# Patient Record
Sex: MALE | Race: WHITE | NOT HISPANIC OR LATINO | Employment: OTHER | URBAN - METROPOLITAN AREA
[De-identification: names, ages, dates, MRNs, and addresses within clinical notes are randomized per-mention and may not be internally consistent; named-entity substitution may affect disease eponyms.]

---

## 2017-02-28 ENCOUNTER — HOSPITAL ENCOUNTER (OUTPATIENT)
Dept: ONCOLOGY | Facility: CLINIC | Age: 62
Discharge: HOME | End: 2017-02-28
Attending: INTERNAL MEDICINE | Admitting: INTERNAL MEDICINE

## 2017-03-06 ENCOUNTER — HOSPITAL ENCOUNTER (OUTPATIENT)
Dept: ONCOLOGY | Facility: CLINIC | Age: 62
Discharge: HOME | End: 2017-03-06
Attending: INTERNAL MEDICINE | Admitting: INTERNAL MEDICINE

## 2017-03-16 ENCOUNTER — HOSPITAL ENCOUNTER (OUTPATIENT)
Dept: CARDIOLOGY | Facility: HOSPITAL | Age: 62
Discharge: HOME | End: 2017-03-16
Attending: SURGERY | Admitting: SURGERY

## 2017-04-03 ENCOUNTER — HOSPITAL ENCOUNTER (OUTPATIENT)
Dept: ONCOLOGY | Facility: CLINIC | Age: 62
Discharge: HOME | End: 2017-04-03
Attending: INTERNAL MEDICINE | Admitting: INTERNAL MEDICINE

## 2017-06-26 ENCOUNTER — HOSPITAL ENCOUNTER (OUTPATIENT)
Dept: CARDIOLOGY | Facility: HOSPITAL | Age: 62
Discharge: HOME | End: 2017-06-26
Attending: PHYSICIAN ASSISTANT | Admitting: PHYSICIAN ASSISTANT

## 2017-07-10 ENCOUNTER — HOSPITAL ENCOUNTER (OUTPATIENT)
Dept: ONCOLOGY | Facility: CLINIC | Age: 62
Discharge: HOME | End: 2017-07-10
Attending: INTERNAL MEDICINE | Admitting: INTERNAL MEDICINE

## 2017-07-10 LAB
ALBUMIN SERPL-MCNC: 4.4 G/DL (ref 3.5–4.8)
ALBUMIN/GLOB SERPL: 1.3 {RATIO} (ref 1–1.7)
ALP SERPL-CCNC: 75 IU/L (ref 32–91)
ALT SERPL-CCNC: 32 IU/L (ref 17–63)
ANION GAP SERPL CALC-SCNC: 16 MMOL/L (ref 10–20)
AST SERPL-CCNC: 27 IU/L (ref 15–41)
BILIRUB SERPL-MCNC: 0.7 MG/DL (ref 0.3–1.2)
BUN SERPL-MCNC: 13 MG/DL (ref 8–20)
BUN/CREAT SERPL: 10.8 (ref 6.2–20.3)
CALCIUM SERPL-MCNC: 9.4 MG/DL (ref 8.9–10.3)
CHLORIDE SERPL-SCNC: 100 MMOL/L (ref 101–111)
CONV CO2: 24 MMOL/L (ref 22–32)
CONV TOTAL PROTEIN: 7.7 G/DL (ref 6.1–7.9)
CREAT UR-MCNC: 1.2 MG/DL (ref 0.7–1.2)
FOLATE SERPL-MCNC: 17.5 NG/ML (ref 5.9–24.8)
GLOBULIN UR ELPH-MCNC: 3.3 G/DL (ref 2.5–3.8)
GLUCOSE SERPL-MCNC: 88 MG/DL (ref 65–99)
POTASSIUM SERPL-SCNC: 4 MMOL/L (ref 3.6–5.1)
SODIUM SERPL-SCNC: 136 MMOL/L (ref 136–144)

## 2017-07-17 ENCOUNTER — HOSPITAL ENCOUNTER (OUTPATIENT)
Dept: ONCOLOGY | Facility: CLINIC | Age: 62
Discharge: HOME | End: 2017-07-17
Attending: INTERNAL MEDICINE | Admitting: INTERNAL MEDICINE

## 2017-07-21 ENCOUNTER — HOSPITAL ENCOUNTER (OUTPATIENT)
Dept: ONCOLOGY | Facility: CLINIC | Age: 62
Discharge: HOME | End: 2017-07-21
Attending: INTERNAL MEDICINE | Admitting: INTERNAL MEDICINE

## 2017-08-07 ENCOUNTER — HOSPITAL ENCOUNTER (OUTPATIENT)
Dept: ONCOLOGY | Facility: CLINIC | Age: 62
Discharge: HOME | End: 2017-08-07
Attending: INTERNAL MEDICINE | Admitting: INTERNAL MEDICINE

## 2017-08-07 LAB — FERRITIN SERPL-MCNC: 226 NG/ML (ref 24–336)

## 2017-08-08 ENCOUNTER — HOSPITAL ENCOUNTER (OUTPATIENT)
Dept: INFUSION THERAPY | Facility: HOSPITAL | Age: 62
Discharge: HOME | End: 2017-08-08
Attending: INTERNAL MEDICINE | Admitting: INTERNAL MEDICINE

## 2017-08-08 LAB
ARMBAND: NORMAL
BASOPHILS # BLD AUTO: 0.1 10*3/UL (ref 0–0.2)
BASOPHILS NFR BLD AUTO: 1 % (ref 0–2)
BLD COMPONENT TYPE: NORMAL
BLD COMPONENT TYPE: NORMAL
BPU ID: NORMAL
CONV PRODUCT 1 STATUS: NORMAL
DIFFERENTIAL METHOD BLD: (no result)
EOSINOPHIL # BLD AUTO: 0.2 10*3/UL (ref 0–0.3)
EOSINOPHIL # BLD AUTO: 3 % (ref 0–3)
ERYTHROCYTE [DISTWIDTH] IN BLOOD BY AUTOMATED COUNT: 13.6 % (ref 11.5–14.5)
HCT VFR BLD AUTO: 45.8 % (ref 40–54)
HGB BLD-MCNC: 15.5 G/DL (ref 14–18)
LYMPHOCYTES # BLD AUTO: 2.2 10*3/UL (ref 0.8–4.8)
LYMPHOCYTES NFR BLD AUTO: 33 % (ref 18–42)
MCH RBC QN AUTO: 29.2 PG (ref 26–32)
MCHC RBC AUTO-ENTMCNC: 33.9 G/DL (ref 32–36)
MCV RBC AUTO: 86.2 FL (ref 80–94)
MONOCYTES # BLD AUTO: (no result) 10*3/UL (ref 0.1–1.3)
MONOCYTES NFR BLD AUTO: 10 % (ref 2–11)
NEUTROPHILS # BLD AUTO: (no result) 10*3/UL (ref 2.3–8.6)
NEUTROPHILS NFR BLD AUTO: 53 % (ref 50–75)
NRBC BLD AUTO-RTO: 0 /100{WBCS}
NRBC/RBC NFR BLD MANUAL: 0 10*3/UL
NUM BPU REQUESTED: 1
PATHOLOGIST REVIEW: (no result)
PLATELET # BLD AUTO: (no result) 10*3/UL (ref 150–450)
PMV BLD AUTO: 10 FL (ref 7.4–10.4)
RBC # BLD AUTO: 5.31 10*6/UL (ref 4.6–6)
TRANS STATUS: NORMAL
UNIT DIVISION: 0
WBC # BLD AUTO: 6.6 10*3/UL (ref 4.5–11.5)

## 2017-08-10 ENCOUNTER — HOSPITAL ENCOUNTER (OUTPATIENT)
Dept: ONCOLOGY | Facility: CLINIC | Age: 62
Discharge: HOME | End: 2017-08-10
Attending: INTERNAL MEDICINE | Admitting: INTERNAL MEDICINE

## 2017-08-11 ENCOUNTER — HOSPITAL ENCOUNTER (OUTPATIENT)
Dept: ONCOLOGY | Facility: CLINIC | Age: 62
Discharge: HOME | End: 2017-08-11
Attending: INTERNAL MEDICINE | Admitting: INTERNAL MEDICINE

## 2017-08-15 ENCOUNTER — HOSPITAL ENCOUNTER (OUTPATIENT)
Dept: ONCOLOGY | Facility: CLINIC | Age: 62
Discharge: HOME | End: 2017-08-15
Attending: NURSE PRACTITIONER | Admitting: NURSE PRACTITIONER

## 2017-08-15 LAB
ALBUMIN SERPL-MCNC: 3.3 G/DL (ref 3.5–4.8)
ALP SERPL-CCNC: 45 IU/L (ref 32–91)
ALT SERPL-CCNC: 76 IU/L (ref 17–63)
AST SERPL-CCNC: 56 IU/L (ref 15–41)
BILIRUB DIRECT SERPL-MCNC: 0.2 MG/DL (ref 0.1–0.5)
BILIRUB SERPL-MCNC: 0.7 MG/DL (ref 0.3–1.2)
CONV TOTAL PROTEIN: 8.9 G/DL (ref 6.1–7.9)

## 2017-08-21 ENCOUNTER — HOSPITAL ENCOUNTER (OUTPATIENT)
Dept: ONCOLOGY | Facility: CLINIC | Age: 62
Discharge: HOME | End: 2017-08-21
Attending: INTERNAL MEDICINE | Admitting: INTERNAL MEDICINE

## 2017-09-05 ENCOUNTER — HOSPITAL ENCOUNTER (OUTPATIENT)
Dept: ONCOLOGY | Facility: CLINIC | Age: 62
Setting detail: INFUSION SERIES
Discharge: HOME | End: 2017-09-05
Attending: INTERNAL MEDICINE | Admitting: INTERNAL MEDICINE

## 2017-09-05 ENCOUNTER — HOSPITAL ENCOUNTER (OUTPATIENT)
Dept: ONCOLOGY | Facility: HOSPITAL | Age: 62
Discharge: HOME | End: 2017-09-05
Attending: INTERNAL MEDICINE | Admitting: INTERNAL MEDICINE

## 2017-09-05 ENCOUNTER — CLINICAL SUPPORT (OUTPATIENT)
Dept: ONCOLOGY | Facility: HOSPITAL | Age: 62
End: 2017-09-05

## 2017-09-05 NOTE — PROGRESS NOTES
PATIENTS ONCOLOGY RECORD LOCATED IN [facility]      Subjective     Name:  NICHOLAS CRAMER     Date:  2017  Address:  [address] [place], [place] [address]  Home: [phone]  :  1955 AGE:  62 y.o.        RECORDS OBTAINED:  Patients Oncology Record is located in [facility]

## 2017-09-08 ENCOUNTER — HOSPITAL ENCOUNTER (OUTPATIENT)
Dept: ONCOLOGY | Facility: HOSPITAL | Age: 62
Discharge: HOME | End: 2017-09-08
Attending: INTERNAL MEDICINE | Admitting: INTERNAL MEDICINE

## 2017-09-08 ENCOUNTER — HOSPITAL ENCOUNTER (OUTPATIENT)
Dept: ONCOLOGY | Facility: CLINIC | Age: 62
Setting detail: INFUSION SERIES
Discharge: HOME | End: 2017-09-08
Attending: INTERNAL MEDICINE | Admitting: INTERNAL MEDICINE

## 2017-09-08 ENCOUNTER — CLINICAL SUPPORT (OUTPATIENT)
Dept: ONCOLOGY | Facility: HOSPITAL | Age: 62
End: 2017-09-08

## 2017-09-08 NOTE — PROGRESS NOTES
PATIENTS ONCOLOGY RECORD LOCATED IN [facility]      Subjective     Name:  NICHOLAS CRAMER     Date:  2017  Address:  [address] [place], [place] [address]  Home: [phone]  :  1955 AGE:  62 y.o.        RECORDS OBTAINED:  Patients Oncology Record is located in [facility]

## 2017-09-12 ENCOUNTER — HOSPITAL ENCOUNTER (OUTPATIENT)
Dept: ONCOLOGY | Facility: HOSPITAL | Age: 62
Discharge: HOME | End: 2017-09-12
Attending: INTERNAL MEDICINE | Admitting: INTERNAL MEDICINE

## 2017-09-12 ENCOUNTER — HOSPITAL ENCOUNTER (OUTPATIENT)
Dept: ONCOLOGY | Facility: CLINIC | Age: 62
Setting detail: INFUSION SERIES
Discharge: HOME | End: 2017-09-12
Attending: INTERNAL MEDICINE | Admitting: INTERNAL MEDICINE

## 2017-09-12 ENCOUNTER — CLINICAL SUPPORT (OUTPATIENT)
Dept: ONCOLOGY | Facility: HOSPITAL | Age: 62
End: 2017-09-12

## 2017-09-12 NOTE — PROGRESS NOTES
PATIENTS ONCOLOGY RECORD LOCATED IN [facility]      Subjective     Name:  NICHOLAS CRAMER     Date:  2017  Address:  [address] [place], [place] [address]  Home: [phone]  :  1955 AGE:  62 y.o.        RECORDS OBTAINED:  Patients Oncology Record is located in [facility]

## 2017-09-22 ENCOUNTER — HOSPITAL ENCOUNTER (OUTPATIENT)
Dept: ONCOLOGY | Facility: CLINIC | Age: 62
Setting detail: INFUSION SERIES
Discharge: HOME | End: 2017-09-22
Attending: NURSE PRACTITIONER | Admitting: NURSE PRACTITIONER

## 2017-09-22 ENCOUNTER — CLINICAL SUPPORT (OUTPATIENT)
Dept: ONCOLOGY | Facility: HOSPITAL | Age: 62
End: 2017-09-22

## 2017-09-22 ENCOUNTER — HOSPITAL ENCOUNTER (OUTPATIENT)
Dept: ONCOLOGY | Facility: HOSPITAL | Age: 62
Discharge: HOME | End: 2017-09-22
Attending: NURSE PRACTITIONER | Admitting: NURSE PRACTITIONER

## 2017-09-22 NOTE — PROGRESS NOTES
PATIENTS ONCOLOGY RECORD LOCATED IN [facility]      Subjective     Name:  NICHOLAS CRAMER     Date:  2017  Address:  [address] [place], [place] [address]  Home: [phone]  :  1955 AGE:  62 y.o.        RECORDS OBTAINED:  Patients Oncology Record is located in [facility]

## 2017-10-05 ENCOUNTER — CLINICAL SUPPORT (OUTPATIENT)
Dept: ONCOLOGY | Facility: HOSPITAL | Age: 62
End: 2017-10-05

## 2017-10-05 ENCOUNTER — HOSPITAL ENCOUNTER (OUTPATIENT)
Dept: ONCOLOGY | Facility: CLINIC | Age: 62
Setting detail: INFUSION SERIES
Discharge: HOME | End: 2017-10-05
Attending: INTERNAL MEDICINE | Admitting: INTERNAL MEDICINE

## 2017-10-05 ENCOUNTER — HOSPITAL ENCOUNTER (OUTPATIENT)
Dept: ONCOLOGY | Facility: HOSPITAL | Age: 62
Discharge: HOME | End: 2017-10-05
Attending: INTERNAL MEDICINE | Admitting: INTERNAL MEDICINE

## 2017-10-05 LAB
ALBUMIN SERPL-MCNC: 3.3 G/DL (ref 3.5–4.8)
ALPHA1 GLOB FLD ELPH-MCNC: 0.3 GM/DL (ref 0.1–0.4)
ALPHA2 GLOB SERPL ELPH-MCNC: 0.7 GM/DL (ref 0.5–1)
B-GLOBULIN SERPL ELPH-MCNC: 1 GM/DL (ref 0.7–1.4)
CONV TOTAL PROTEIN: 6.6 G/DL (ref 6.1–7.9)
GAMMA GLOB SERPL ELPH-MCNC: 1.4 GM/DL (ref 0.6–1.6)
INSULIN SERPL-ACNC: ABNORMAL U[IU]/ML

## 2017-10-05 NOTE — PROGRESS NOTES
PATIENTS ONCOLOGY RECORD LOCATED IN [facility]      Subjective     Name:  NICHOLAS CRAMER     Date:  10/05/2017  Address:  [address] [place], [place] [address]  Home: [phone]  :  1955 AGE:  62 y.o.        RECORDS OBTAINED:  Patients Oncology Record is located in [facility]

## 2017-10-06 ENCOUNTER — CLINICAL SUPPORT (OUTPATIENT)
Dept: ONCOLOGY | Facility: HOSPITAL | Age: 62
End: 2017-10-06

## 2017-10-06 ENCOUNTER — HOSPITAL ENCOUNTER (OUTPATIENT)
Dept: ONCOLOGY | Facility: CLINIC | Age: 62
Setting detail: INFUSION SERIES
Discharge: HOME | End: 2017-10-06
Attending: INTERNAL MEDICINE | Admitting: INTERNAL MEDICINE

## 2017-10-06 ENCOUNTER — HOSPITAL ENCOUNTER (OUTPATIENT)
Dept: ONCOLOGY | Facility: HOSPITAL | Age: 62
Discharge: HOME | End: 2017-10-06
Attending: INTERNAL MEDICINE | Admitting: INTERNAL MEDICINE

## 2017-10-10 ENCOUNTER — HOSPITAL ENCOUNTER (OUTPATIENT)
Dept: ONCOLOGY | Facility: CLINIC | Age: 62
Setting detail: INFUSION SERIES
Discharge: HOME | End: 2017-10-10
Attending: INTERNAL MEDICINE | Admitting: INTERNAL MEDICINE

## 2017-10-10 ENCOUNTER — CLINICAL SUPPORT (OUTPATIENT)
Dept: ONCOLOGY | Facility: HOSPITAL | Age: 62
End: 2017-10-10

## 2017-10-10 ENCOUNTER — HOSPITAL ENCOUNTER (OUTPATIENT)
Dept: ONCOLOGY | Facility: HOSPITAL | Age: 62
Discharge: HOME | End: 2017-10-10
Attending: INTERNAL MEDICINE | Admitting: INTERNAL MEDICINE

## 2017-10-10 NOTE — PROGRESS NOTES
PATIENTS ONCOLOGY RECORD LOCATED IN [facility]      Subjective     Name:  NICHOLAS CRAMER     Date:  10/10/2017  Address:  [address] [id number]  Home: [phone]  :  1955 AGE:  62 y.o.        RECORDS OBTAINED:  Patients Oncology Record is located in [facility]

## 2017-10-17 ENCOUNTER — CLINICAL SUPPORT (OUTPATIENT)
Dept: ONCOLOGY | Facility: HOSPITAL | Age: 62
End: 2017-10-17

## 2017-10-17 ENCOUNTER — HOSPITAL ENCOUNTER (OUTPATIENT)
Dept: ONCOLOGY | Facility: HOSPITAL | Age: 62
Discharge: HOME | End: 2017-10-17
Attending: INTERNAL MEDICINE | Admitting: INTERNAL MEDICINE

## 2017-10-17 ENCOUNTER — HOSPITAL ENCOUNTER (OUTPATIENT)
Dept: ONCOLOGY | Facility: CLINIC | Age: 62
Setting detail: INFUSION SERIES
Discharge: HOME | End: 2017-10-17
Attending: INTERNAL MEDICINE | Admitting: INTERNAL MEDICINE

## 2017-10-17 NOTE — PROGRESS NOTES
PATIENTS ONCOLOGY RECORD LOCATED IN [facility]      Subjective     Name:  NICHOLAS CRAMER     Date:  10/17/2017  Address:  [address] [place], [place] [address]  Home: [phone]  :  1955 AGE:  62 y.o.        RECORDS OBTAINED:  Patients Oncology Record is located in [facility]

## 2017-10-24 ENCOUNTER — HOSPITAL ENCOUNTER (OUTPATIENT)
Dept: ONCOLOGY | Facility: HOSPITAL | Age: 62
Discharge: HOME | End: 2017-10-24
Attending: INTERNAL MEDICINE | Admitting: INTERNAL MEDICINE

## 2017-10-24 ENCOUNTER — HOSPITAL ENCOUNTER (OUTPATIENT)
Dept: ONCOLOGY | Facility: CLINIC | Age: 62
Setting detail: INFUSION SERIES
Discharge: HOME | End: 2017-10-24
Attending: INTERNAL MEDICINE | Admitting: INTERNAL MEDICINE

## 2017-10-24 ENCOUNTER — CLINICAL SUPPORT (OUTPATIENT)
Dept: ONCOLOGY | Facility: HOSPITAL | Age: 62
End: 2017-10-24

## 2017-10-24 NOTE — PROGRESS NOTES
PATIENTS ONCOLOGY RECORD LOCATED IN [facility]      Subjective     Name:  NICHOLAS CRAMER     Date:  10/24/2017  Address:  [address] [place], [place] [address]  Home: [phone]  :  1955 AGE:  62 y.o.        RECORDS OBTAINED:  Patients Oncology Record is located in [facility]

## 2017-10-31 ENCOUNTER — HOSPITAL ENCOUNTER (OUTPATIENT)
Dept: ONCOLOGY | Facility: HOSPITAL | Age: 62
Discharge: HOME | End: 2017-10-31
Attending: INTERNAL MEDICINE | Admitting: INTERNAL MEDICINE

## 2017-10-31 ENCOUNTER — HOSPITAL ENCOUNTER (OUTPATIENT)
Dept: ONCOLOGY | Facility: CLINIC | Age: 62
Setting detail: INFUSION SERIES
Discharge: HOME | End: 2017-10-31
Attending: INTERNAL MEDICINE | Admitting: INTERNAL MEDICINE

## 2017-10-31 ENCOUNTER — CLINICAL SUPPORT (OUTPATIENT)
Dept: ONCOLOGY | Facility: HOSPITAL | Age: 62
End: 2017-10-31

## 2017-10-31 NOTE — PROGRESS NOTES
PATIENTS ONCOLOGY RECORD LOCATED IN [facility]      Subjective     Name:  NICHOLAS CRAMER     Date:  10/31/2017  Address:  [address] [place], [place] [address]  Home: [phone]  :  1955 AGE:  62 y.o.        RECORDS OBTAINED:  Patients Oncology Record is located in [facility]

## 2017-11-07 ENCOUNTER — CLINICAL SUPPORT (OUTPATIENT)
Dept: ONCOLOGY | Facility: HOSPITAL | Age: 62
End: 2017-11-07

## 2017-11-07 ENCOUNTER — HOSPITAL ENCOUNTER (OUTPATIENT)
Dept: ONCOLOGY | Facility: CLINIC | Age: 62
Setting detail: INFUSION SERIES
Discharge: HOME | End: 2017-11-07
Attending: INTERNAL MEDICINE | Admitting: INTERNAL MEDICINE

## 2017-11-07 ENCOUNTER — HOSPITAL ENCOUNTER (OUTPATIENT)
Dept: ONCOLOGY | Facility: HOSPITAL | Age: 62
Discharge: HOME | End: 2017-11-07
Attending: INTERNAL MEDICINE | Admitting: INTERNAL MEDICINE

## 2017-11-07 NOTE — PROGRESS NOTES
PATIENTS ONCOLOGY RECORD LOCATED IN [facility]      Subjective     Name:  NICHOLAS CRAMER     Date:  2017  Address:  [address] [place], [place] [address]  Home: [phone]  :  1955 AGE:  62 y.o.        RECORDS OBTAINED:  Patients Oncology Record is located in [facility]

## 2017-11-08 ENCOUNTER — HOSPITAL ENCOUNTER (OUTPATIENT)
Dept: ONCOLOGY | Facility: CLINIC | Age: 62
Setting detail: INFUSION SERIES
Discharge: HOME | End: 2017-11-08
Attending: INTERNAL MEDICINE | Admitting: INTERNAL MEDICINE

## 2017-11-08 ENCOUNTER — HOSPITAL ENCOUNTER (OUTPATIENT)
Dept: ONCOLOGY | Facility: HOSPITAL | Age: 62
Discharge: HOME | End: 2017-11-08
Attending: INTERNAL MEDICINE | Admitting: INTERNAL MEDICINE

## 2017-11-08 ENCOUNTER — CLINICAL SUPPORT (OUTPATIENT)
Dept: ONCOLOGY | Facility: HOSPITAL | Age: 62
End: 2017-11-08

## 2017-11-08 NOTE — PROGRESS NOTES
PATIENTS ONCOLOGY RECORD LOCATED IN [facility]      Subjective     Name:  NICHOLAS CRAMER     Date:  2017  Address:  [address] [place], [place] [address]  Home: [phone]  :  1955 AGE:  62 y.o.        RECORDS OBTAINED:  Patients Oncology Record is located in [facility]

## 2017-11-14 ENCOUNTER — HOSPITAL ENCOUNTER (OUTPATIENT)
Dept: ONCOLOGY | Facility: CLINIC | Age: 62
Setting detail: INFUSION SERIES
Discharge: HOME | End: 2017-11-14
Attending: INTERNAL MEDICINE | Admitting: INTERNAL MEDICINE

## 2017-11-14 ENCOUNTER — HOSPITAL ENCOUNTER (OUTPATIENT)
Dept: ONCOLOGY | Facility: HOSPITAL | Age: 62
Discharge: HOME | End: 2017-11-14
Attending: INTERNAL MEDICINE | Admitting: INTERNAL MEDICINE

## 2017-11-14 ENCOUNTER — CLINICAL SUPPORT (OUTPATIENT)
Dept: ONCOLOGY | Facility: HOSPITAL | Age: 62
End: 2017-11-14

## 2017-11-14 NOTE — PROGRESS NOTES
PATIENTS ONCOLOGY RECORD LOCATED IN [facility]      Subjective     Name:  NICHOLAS CRAMER     Date:  2017  Address:  [address] [place], [place] [address]  Home: [phone]  :  1955 AGE:  62 y.o.        RECORDS OBTAINED:  Patients Oncology Record is located in [facility]

## 2017-11-21 ENCOUNTER — HOSPITAL ENCOUNTER (OUTPATIENT)
Dept: ONCOLOGY | Facility: HOSPITAL | Age: 62
Discharge: HOME | End: 2017-11-21
Attending: INTERNAL MEDICINE | Admitting: INTERNAL MEDICINE

## 2017-11-21 ENCOUNTER — CLINICAL SUPPORT (OUTPATIENT)
Dept: ONCOLOGY | Facility: HOSPITAL | Age: 62
End: 2017-11-21

## 2017-11-21 ENCOUNTER — HOSPITAL ENCOUNTER (OUTPATIENT)
Dept: ONCOLOGY | Facility: CLINIC | Age: 62
Setting detail: INFUSION SERIES
Discharge: HOME | End: 2017-11-21
Attending: INTERNAL MEDICINE | Admitting: INTERNAL MEDICINE

## 2017-11-21 NOTE — PROGRESS NOTES
PATIENTS ONCOLOGY RECORD LOCATED IN [facility]      Subjective     Name:  NICHOLAS CRAMER     Date:  2017  Address:  [address] [place], [place] [address]  Home: [phone]  :  1955 AGE:  62 y.o.        RECORDS OBTAINED:  Patients Oncology Record [facility] in [facility]

## 2017-11-28 ENCOUNTER — HOSPITAL ENCOUNTER (OUTPATIENT)
Dept: ONCOLOGY | Facility: CLINIC | Age: 62
Setting detail: INFUSION SERIES
Discharge: HOME | End: 2017-11-28
Attending: INTERNAL MEDICINE | Admitting: INTERNAL MEDICINE

## 2017-11-28 ENCOUNTER — HOSPITAL ENCOUNTER (OUTPATIENT)
Dept: ONCOLOGY | Facility: HOSPITAL | Age: 62
Discharge: HOME | End: 2017-11-28
Attending: INTERNAL MEDICINE | Admitting: INTERNAL MEDICINE

## 2017-11-28 ENCOUNTER — CLINICAL SUPPORT (OUTPATIENT)
Dept: ONCOLOGY | Facility: HOSPITAL | Age: 62
End: 2017-11-28

## 2017-11-29 NOTE — PROGRESS NOTES
PATIENTS ONCOLOGY RECORD LOCATED IN [facility]      Subjective     Name:  NICHOLAS CRAMER     Date:  2017  Address:  [address] [place], [place] [address]  Home: [phone]  :  1955 AGE:  62 y.o.        RECORDS OBTAINED:  Patients Oncology Record is located in [facility]

## 2017-12-05 ENCOUNTER — HOSPITAL ENCOUNTER (OUTPATIENT)
Dept: ONCOLOGY | Facility: HOSPITAL | Age: 62
Discharge: HOME | End: 2017-12-05
Attending: INTERNAL MEDICINE | Admitting: INTERNAL MEDICINE

## 2017-12-05 ENCOUNTER — HOSPITAL ENCOUNTER (OUTPATIENT)
Dept: ONCOLOGY | Facility: CLINIC | Age: 62
Setting detail: INFUSION SERIES
Discharge: HOME | End: 2017-12-05
Attending: INTERNAL MEDICINE | Admitting: INTERNAL MEDICINE

## 2017-12-05 ENCOUNTER — CLINICAL SUPPORT (OUTPATIENT)
Dept: ONCOLOGY | Facility: HOSPITAL | Age: 62
End: 2017-12-05

## 2017-12-05 NOTE — PROGRESS NOTES
PATIENTS ONCOLOGY RECORD LOCATED IN [facility]      Subjective     Name:  NICHOLAS CRAMER     Date:  2017  Address:  [address] [place], [place] [address]  Home: [phone]  :  1955 AGE:  62 y.o.        RECORDS OBTAINED:  Patients Oncology Record is located in [facility]

## 2017-12-06 ENCOUNTER — HOSPITAL ENCOUNTER (OUTPATIENT)
Dept: ONCOLOGY | Facility: CLINIC | Age: 62
Setting detail: INFUSION SERIES
Discharge: HOME | End: 2017-12-06
Attending: NURSE PRACTITIONER | Admitting: NURSE PRACTITIONER

## 2017-12-06 ENCOUNTER — CLINICAL SUPPORT (OUTPATIENT)
Dept: ONCOLOGY | Facility: HOSPITAL | Age: 62
End: 2017-12-06

## 2017-12-06 ENCOUNTER — HOSPITAL ENCOUNTER (OUTPATIENT)
Dept: ONCOLOGY | Facility: HOSPITAL | Age: 62
Discharge: HOME | End: 2017-12-06
Attending: NURSE PRACTITIONER | Admitting: NURSE PRACTITIONER

## 2017-12-06 LAB
ALBUMIN SERPL-MCNC: 4.1 G/DL (ref 3.5–4.8)
ALBUMIN/GLOB SERPL: 1.5 {RATIO} (ref 1–1.7)
ALP SERPL-CCNC: 61 IU/L (ref 32–91)
ALT SERPL-CCNC: 34 IU/L (ref 17–63)
ANION GAP SERPL CALC-SCNC: 11.3 MMOL/L (ref 10–20)
AST SERPL-CCNC: 27 IU/L (ref 15–41)
BILIRUB SERPL-MCNC: 0.5 MG/DL (ref 0.3–1.2)
BUN SERPL-MCNC: 14 MG/DL (ref 8–20)
BUN/CREAT SERPL: 10.8 (ref 6.2–20.3)
CALCIUM SERPL-MCNC: 9.2 MG/DL (ref 8.9–10.3)
CHLORIDE SERPL-SCNC: 105 MMOL/L (ref 101–111)
CONV CO2: 26 MMOL/L (ref 22–32)
CONV TOTAL PROTEIN: 6.8 G/DL (ref 6.1–7.9)
CREAT UR-MCNC: 1.3 MG/DL (ref 0.7–1.2)
FERRITIN SERPL-MCNC: 58 NG/ML (ref 24–336)
GLOBULIN UR ELPH-MCNC: 2.7 G/DL (ref 2.5–3.8)
GLUCOSE SERPL-MCNC: 94 MG/DL (ref 65–99)
IGA1 MFR SER: 214 MG/DL (ref 50–400)
IGG1 SER-MCNC: 1080 MG/DL (ref 600–1500)
IGM SERPL-MCNC: 51 MG/DL (ref 40–300)
IRON SATN MFR SERPL: 20 % (ref 20–50)
IRON SERPL-MCNC: 72 UG/DL (ref 45–182)
POTASSIUM SERPL-SCNC: 4.3 MMOL/L (ref 3.6–5.1)
SODIUM SERPL-SCNC: 138 MMOL/L (ref 136–144)
TIBC SERPL-MCNC: 360 UG/DL (ref 228–428)

## 2017-12-06 NOTE — PROGRESS NOTES
PATIENTS ONCOLOGY RECORD LOCATED IN [facility]      Subjective     Name:  NICHOLAS CRAMER     Date:  2017  Address:  [address] [place], [place] [address]  Home: [phone]  :  1955 AGE:  62 y.o.        RECORDS OBTAINED:  Patients Oncology Record is located in [facility]

## 2017-12-12 ENCOUNTER — HOSPITAL ENCOUNTER (OUTPATIENT)
Dept: ONCOLOGY | Facility: HOSPITAL | Age: 62
Discharge: HOME | End: 2017-12-12
Attending: INTERNAL MEDICINE | Admitting: INTERNAL MEDICINE

## 2017-12-12 ENCOUNTER — CLINICAL SUPPORT (OUTPATIENT)
Dept: ONCOLOGY | Facility: HOSPITAL | Age: 62
End: 2017-12-12

## 2017-12-12 ENCOUNTER — HOSPITAL ENCOUNTER (OUTPATIENT)
Dept: ONCOLOGY | Facility: CLINIC | Age: 62
Setting detail: INFUSION SERIES
Discharge: HOME | End: 2017-12-12
Attending: INTERNAL MEDICINE | Admitting: INTERNAL MEDICINE

## 2017-12-13 NOTE — PROGRESS NOTES
PATIENTS ONCOLOGY RECORD LOCATED IN [facility]      Subjective     Name:  NICHOLAS CRAMER     Date:  2017  Address:  [address] [place], [place] [address]  Home: [phone]  :  1955 AGE:  62 y.o.        RECORDS OBTAINED:  Patients Oncology Record is located in [facility]

## 2017-12-19 ENCOUNTER — HOSPITAL ENCOUNTER (OUTPATIENT)
Dept: ONCOLOGY | Facility: CLINIC | Age: 62
Setting detail: INFUSION SERIES
Discharge: HOME | End: 2017-12-19
Attending: INTERNAL MEDICINE | Admitting: INTERNAL MEDICINE

## 2017-12-19 ENCOUNTER — HOSPITAL ENCOUNTER (OUTPATIENT)
Dept: ONCOLOGY | Facility: HOSPITAL | Age: 62
Discharge: HOME | End: 2017-12-19
Attending: INTERNAL MEDICINE | Admitting: INTERNAL MEDICINE

## 2017-12-19 ENCOUNTER — CLINICAL SUPPORT (OUTPATIENT)
Dept: ONCOLOGY | Facility: HOSPITAL | Age: 62
End: 2017-12-19

## 2017-12-26 ENCOUNTER — HOSPITAL ENCOUNTER (OUTPATIENT)
Dept: ONCOLOGY | Facility: HOSPITAL | Age: 62
Discharge: HOME | End: 2017-12-26
Attending: INTERNAL MEDICINE | Admitting: INTERNAL MEDICINE

## 2017-12-26 ENCOUNTER — CLINICAL SUPPORT (OUTPATIENT)
Dept: ONCOLOGY | Facility: HOSPITAL | Age: 62
End: 2017-12-26

## 2017-12-26 ENCOUNTER — HOSPITAL ENCOUNTER (OUTPATIENT)
Dept: ONCOLOGY | Facility: CLINIC | Age: 62
Setting detail: INFUSION SERIES
Discharge: HOME | End: 2017-12-26
Attending: INTERNAL MEDICINE | Admitting: INTERNAL MEDICINE

## 2017-12-26 NOTE — PROGRESS NOTES
PATIENTS ONCOLOGY RECORD LOCATED IN [facility]      Subjective     Name:  NICHOLAS CRAMER     Date:  2017  Address:  [address] [place], [place] [address]  Home: [phone]  :  1955 AGE:  62 y.o.        RECORDS OBTAINED:  Patients Oncology Record is located in [facility]

## 2018-01-03 ENCOUNTER — CLINICAL SUPPORT (OUTPATIENT)
Dept: ONCOLOGY | Facility: HOSPITAL | Age: 63
End: 2018-01-03

## 2018-01-03 ENCOUNTER — HOSPITAL ENCOUNTER (OUTPATIENT)
Dept: ONCOLOGY | Facility: CLINIC | Age: 63
Setting detail: INFUSION SERIES
Discharge: HOME | End: 2018-01-03
Attending: INTERNAL MEDICINE | Admitting: INTERNAL MEDICINE

## 2018-01-04 NOTE — PROGRESS NOTES
PATIENTS ONCOLOGY RECORD LOCATED IN [facility]      Subjective     Name:  NICHOLAS CRAMER     Date:  2018  Address:  [address] [place], [place] [address]  Home: [phone]  :  1955 AGE:  62 y.o.        RECORDS OBTAINED:  Patients Oncology Record is located in [facility]

## 2018-01-05 ENCOUNTER — HOSPITAL ENCOUNTER (OUTPATIENT)
Dept: ONCOLOGY | Facility: HOSPITAL | Age: 63
Discharge: HOME | End: 2018-01-05
Attending: INTERNAL MEDICINE | Admitting: INTERNAL MEDICINE

## 2018-01-05 ENCOUNTER — CLINICAL SUPPORT (OUTPATIENT)
Dept: ONCOLOGY | Facility: HOSPITAL | Age: 63
End: 2018-01-05

## 2018-01-05 ENCOUNTER — HOSPITAL ENCOUNTER (OUTPATIENT)
Dept: ONCOLOGY | Facility: CLINIC | Age: 63
Setting detail: INFUSION SERIES
Discharge: HOME | End: 2018-01-05
Attending: INTERNAL MEDICINE | Admitting: INTERNAL MEDICINE

## 2018-01-05 LAB
ALBUMIN SERPL-MCNC: 4 G/DL (ref 3.5–4.8)
ALBUMIN/GLOB SERPL: 1.4 {RATIO} (ref 1–1.7)
ALP SERPL-CCNC: 62 IU/L (ref 32–91)
ALT SERPL-CCNC: 34 IU/L (ref 17–63)
ANION GAP SERPL CALC-SCNC: 12.1 MMOL/L (ref 10–20)
AST SERPL-CCNC: 25 IU/L (ref 15–41)
BILIRUB SERPL-MCNC: 0.8 MG/DL (ref 0.3–1.2)
BUN SERPL-MCNC: 18 MG/DL (ref 8–20)
BUN/CREAT SERPL: 15 (ref 6.2–20.3)
CALCIUM SERPL-MCNC: 9 MG/DL (ref 8.9–10.3)
CHLORIDE SERPL-SCNC: 101 MMOL/L (ref 101–111)
CONV CO2: 23 MMOL/L (ref 22–32)
CONV TOTAL PROTEIN: 6.9 G/DL (ref 6.1–7.9)
CREAT UR-MCNC: 1.2 MG/DL (ref 0.7–1.2)
GLOBULIN UR ELPH-MCNC: 2.9 G/DL (ref 2.5–3.8)
GLUCOSE SERPL-MCNC: 197 MG/DL (ref 65–99)
MAGNESIUM SERPL-MCNC: 2 MG/DL (ref 1.8–2.5)
POTASSIUM SERPL-SCNC: 4.1 MMOL/L (ref 3.6–5.1)
SODIUM SERPL-SCNC: 132 MMOL/L (ref 136–144)

## 2018-01-05 NOTE — PROGRESS NOTES
PATIENTS ONCOLOGY RECORD LOCATED IN [facility]      Subjective     Name:  NICHOLAS CRAMER     Date:  2018  Address:  [facility] [id number]  Home: [phone]  :  1955 AGE:  62 y.o.        RECORDS OBTAINED:  Patients Oncology Record is located in [facility]

## 2018-01-10 ENCOUNTER — CLINICAL SUPPORT (OUTPATIENT)
Dept: ONCOLOGY | Facility: HOSPITAL | Age: 63
End: 2018-01-10

## 2018-01-10 ENCOUNTER — HOSPITAL ENCOUNTER (OUTPATIENT)
Dept: ONCOLOGY | Facility: CLINIC | Age: 63
Setting detail: INFUSION SERIES
Discharge: HOME | End: 2018-01-10
Attending: INTERNAL MEDICINE | Admitting: INTERNAL MEDICINE

## 2018-01-11 NOTE — PROGRESS NOTES
PATIENTS ONCOLOGY RECORD LOCATED IN [facility]      Subjective     Name:  NICHOLAS CRAMER     Date:  01/10/2018  Address:  [address] [id number]  Home: [phone]  :  1955 AGE:  62 y.o.        RECORDS OBTAINED:  Patients Oncology Record is located in [facility]

## 2018-01-24 ENCOUNTER — HOSPITAL ENCOUNTER (OUTPATIENT)
Dept: ONCOLOGY | Facility: CLINIC | Age: 63
Setting detail: INFUSION SERIES
Discharge: HOME | End: 2018-01-24
Attending: INTERNAL MEDICINE | Admitting: INTERNAL MEDICINE

## 2018-01-24 ENCOUNTER — CLINICAL SUPPORT (OUTPATIENT)
Dept: ONCOLOGY | Facility: HOSPITAL | Age: 63
End: 2018-01-24

## 2018-01-24 NOTE — PROGRESS NOTES
PATIENTS ONCOLOGY RECORD LOCATED IN [facility]      Subjective     Name:  NICHOLAS CRAMER     Date:  2018  Address:  [address] [place], [place] [address]  Home: [phone]  :  1955 AGE:  62 y.o.        RECORDS OBTAINED:  Patients Oncology Record is located in [facility]

## 2018-02-05 ENCOUNTER — HOSPITAL ENCOUNTER (OUTPATIENT)
Dept: ONCOLOGY | Facility: CLINIC | Age: 63
Setting detail: INFUSION SERIES
Discharge: HOME | End: 2018-02-05
Attending: NURSE PRACTITIONER | Admitting: NURSE PRACTITIONER

## 2018-02-05 ENCOUNTER — CLINICAL SUPPORT (OUTPATIENT)
Dept: ONCOLOGY | Facility: HOSPITAL | Age: 63
End: 2018-02-05

## 2018-02-05 NOTE — PROGRESS NOTES
PATIENTS ONCOLOGY RECORD LOCATED IN [facility]      Subjective     Name:  NICHOLAS CRAMER     Date:  2018  Address:  [address] [place], [place] [address]  Home: [phone]  :  1955 AGE:  62 y.o.        RECORDS OBTAINED:  Patients Oncology Record is located in [facility]

## 2018-02-19 ENCOUNTER — HOSPITAL ENCOUNTER (OUTPATIENT)
Dept: ONCOLOGY | Facility: CLINIC | Age: 63
Setting detail: INFUSION SERIES
Discharge: HOME | End: 2018-02-19
Attending: INTERNAL MEDICINE | Admitting: INTERNAL MEDICINE

## 2018-02-19 ENCOUNTER — CLINICAL SUPPORT (OUTPATIENT)
Dept: ONCOLOGY | Facility: HOSPITAL | Age: 63
End: 2018-02-19

## 2018-02-19 NOTE — PROGRESS NOTES
PATIENTS ONCOLOGY RECORD LOCATED IN [facility]      Subjective     Name:  NICHOLAS CRAMER     Date:  2018  Address:  [address] [place], [place] [address]  Home: [phone]  :  1955 AGE:  62 y.o.        RECORDS OBTAINED:  Patients Oncology Record is located in [facility]

## 2018-02-26 ENCOUNTER — HOSPITAL ENCOUNTER (OUTPATIENT)
Dept: ONCOLOGY | Facility: CLINIC | Age: 63
Setting detail: INFUSION SERIES
Discharge: HOME | End: 2018-02-26
Attending: INTERNAL MEDICINE | Admitting: INTERNAL MEDICINE

## 2018-02-26 ENCOUNTER — CLINICAL SUPPORT (OUTPATIENT)
Dept: ONCOLOGY | Facility: HOSPITAL | Age: 63
End: 2018-02-26

## 2018-02-27 NOTE — PROGRESS NOTES
PATIENTS ONCOLOGY RECORD LOCATED IN [facility]      Subjective     Name:  NICHOLAS CRAMER     Date:  2018  Address:  [address] [place], [place] [address]  Home: [phone]  :  1955 AGE:  62 y.o.        RECORDS OBTAINED:  Patients Oncology Record is located in [facility]

## 2018-03-05 ENCOUNTER — CLINICAL SUPPORT (OUTPATIENT)
Dept: ONCOLOGY | Facility: HOSPITAL | Age: 63
End: 2018-03-05

## 2018-03-05 ENCOUNTER — HOSPITAL ENCOUNTER (OUTPATIENT)
Dept: ONCOLOGY | Facility: CLINIC | Age: 63
Setting detail: INFUSION SERIES
Discharge: HOME | End: 2018-03-05
Attending: INTERNAL MEDICINE | Admitting: INTERNAL MEDICINE

## 2018-03-05 NOTE — PROGRESS NOTES
PATIENTS ONCOLOGY RECORD LOCATED IN [facility]      Subjective     Name:  NICHOLAS CRAMER     Date:  2018  Address:  [address] [place], [place] [address]  Home: [phone]  :  1955 AGE:  62 y.o.        RECORDS OBTAINED:  Patients Oncology Record is located in [facility]

## 2018-03-12 ENCOUNTER — CLINICAL SUPPORT (OUTPATIENT)
Dept: ONCOLOGY | Facility: HOSPITAL | Age: 63
End: 2018-03-12

## 2018-03-12 ENCOUNTER — HOSPITAL ENCOUNTER (OUTPATIENT)
Dept: ONCOLOGY | Facility: HOSPITAL | Age: 63
Discharge: HOME | End: 2018-03-12
Attending: INTERNAL MEDICINE | Admitting: INTERNAL MEDICINE

## 2018-03-12 ENCOUNTER — HOSPITAL ENCOUNTER (OUTPATIENT)
Dept: ONCOLOGY | Facility: CLINIC | Age: 63
Setting detail: INFUSION SERIES
Discharge: HOME | End: 2018-03-12
Attending: INTERNAL MEDICINE | Admitting: INTERNAL MEDICINE

## 2018-03-12 NOTE — PROGRESS NOTES
PATIENTS ONCOLOGY RECORD LOCATED IN [facility]      Subjective     Name:  NICHOLAS CRAMER     Date:  2018  Address:  [address] [place], [place] [address]  Home: [phone]  :  1955 AGE:  62 y.o.        RECORDS OBTAINED:  Patients Oncology Record is located in [facility]

## 2018-03-19 ENCOUNTER — CLINICAL SUPPORT (OUTPATIENT)
Dept: ONCOLOGY | Facility: HOSPITAL | Age: 63
End: 2018-03-19

## 2018-03-19 ENCOUNTER — HOSPITAL ENCOUNTER (OUTPATIENT)
Dept: ONCOLOGY | Facility: CLINIC | Age: 63
Setting detail: INFUSION SERIES
Discharge: HOME | End: 2018-03-19
Attending: INTERNAL MEDICINE | Admitting: INTERNAL MEDICINE

## 2018-03-19 NOTE — PROGRESS NOTES
PATIENTS ONCOLOGY RECORD LOCATED IN [facility]      Subjective     Name:  NICHOLAS CRAMER     Date:  2018  Address:  [address] [place], [place] [address]  Home: [phone]  :  1955 AGE:  62 y.o.        RECORDS OBTAINED:  Patients Oncology Record is located in [facility]

## 2018-04-04 ENCOUNTER — HOSPITAL ENCOUNTER (OUTPATIENT)
Dept: ONCOLOGY | Facility: CLINIC | Age: 63
Setting detail: INFUSION SERIES
Discharge: HOME | End: 2018-04-04
Attending: NURSE PRACTITIONER | Admitting: NURSE PRACTITIONER

## 2018-04-04 ENCOUNTER — CLINICAL SUPPORT (OUTPATIENT)
Dept: ONCOLOGY | Facility: HOSPITAL | Age: 63
End: 2018-04-04

## 2018-04-04 NOTE — PROGRESS NOTES
PATIENTS ONCOLOGY RECORD LOCATED IN [facility]      Subjective     Name:  NICHOLAS CRAMER     Date:  2018  Address:  [address] [place], [place] [address]  Home: [phone]  :  1955 AGE:  62 y.o.        RECORDS OBTAINED:  Patients Oncology Record is located in [facility]

## 2018-04-09 ENCOUNTER — CLINICAL SUPPORT (OUTPATIENT)
Dept: ONCOLOGY | Facility: HOSPITAL | Age: 63
End: 2018-04-09

## 2018-04-09 ENCOUNTER — HOSPITAL ENCOUNTER (OUTPATIENT)
Dept: ONCOLOGY | Facility: CLINIC | Age: 63
Setting detail: INFUSION SERIES
Discharge: HOME | End: 2018-04-09
Attending: INTERNAL MEDICINE | Admitting: INTERNAL MEDICINE

## 2018-04-09 NOTE — PROGRESS NOTES
PATIENTS ONCOLOGY RECORD LOCATED IN [facility]      Subjective     Name:  NICHOLAS CRAMER     Date:  2018  Address:  [address] [place], [place] [address]  Home: [phone]  :  1955 AGE:  62 y.o.        RECORDS OBTAINED:  Patients Oncology Record is located in [facility]

## 2018-04-16 ENCOUNTER — CLINICAL SUPPORT (OUTPATIENT)
Dept: ONCOLOGY | Facility: HOSPITAL | Age: 63
End: 2018-04-16

## 2018-04-16 ENCOUNTER — HOSPITAL ENCOUNTER (OUTPATIENT)
Dept: ONCOLOGY | Facility: CLINIC | Age: 63
Setting detail: INFUSION SERIES
Discharge: HOME | End: 2018-04-16
Attending: INTERNAL MEDICINE | Admitting: INTERNAL MEDICINE

## 2018-04-16 NOTE — PROGRESS NOTES
PATIENTS ONCOLOGY RECORD LOCATED IN [facility]      Subjective     Name:  NICHOLAS CRAMER     Date:  2018  Address:  [address] [place], [place] [address]  Home: [phone]  :  1955 AGE:  62 y.o.        RECORDS OBTAINED:  Patients Oncology Record is located in [facility]

## 2018-05-02 ENCOUNTER — CLINICAL SUPPORT (OUTPATIENT)
Dept: ONCOLOGY | Facility: HOSPITAL | Age: 63
End: 2018-05-02

## 2018-05-02 ENCOUNTER — HOSPITAL ENCOUNTER (OUTPATIENT)
Dept: ONCOLOGY | Facility: CLINIC | Age: 63
Setting detail: INFUSION SERIES
Discharge: HOME | End: 2018-05-02
Attending: INTERNAL MEDICINE | Admitting: INTERNAL MEDICINE

## 2018-05-02 NOTE — PROGRESS NOTES
PATIENTS ONCOLOGY RECORD LOCATED IN [facility]      Subjective     Name:  NICHOLAS CRAMER     Date:  2018  Address:  [address] [place], [place] [address]  Home: [phone]  :  1955 AGE:  62 y.o.        RECORDS OBTAINED:  Patients Oncology Record is located in [facility]

## 2018-05-07 ENCOUNTER — CLINICAL SUPPORT (OUTPATIENT)
Dept: ONCOLOGY | Facility: HOSPITAL | Age: 63
End: 2018-05-07

## 2018-05-07 ENCOUNTER — HOSPITAL ENCOUNTER (OUTPATIENT)
Dept: ONCOLOGY | Facility: CLINIC | Age: 63
Setting detail: INFUSION SERIES
Discharge: HOME | End: 2018-05-07
Attending: INTERNAL MEDICINE | Admitting: INTERNAL MEDICINE

## 2018-05-07 NOTE — PROGRESS NOTES
PATIENTS ONCOLOGY RECORD LOCATED IN [facility]      Subjective     Name:  NICHOLAS CRAMER     Date:  2018  Address:  [address] [place], [place] [address]  Home: [phone]  :  1955 AGE:  62 y.o.        RECORDS OBTAINED:  Patients Oncology Record is located in [facility]

## 2018-05-16 ENCOUNTER — HOSPITAL ENCOUNTER (OUTPATIENT)
Dept: LAB | Facility: HOSPITAL | Age: 63
Discharge: HOME | End: 2018-05-16
Attending: FAMILY MEDICINE | Admitting: FAMILY MEDICINE

## 2018-05-16 LAB
ALBUMIN SERPL-MCNC: 4.4 G/DL (ref 3.5–4.8)
ALBUMIN/GLOB SERPL: 1.6 {RATIO} (ref 1–1.7)
ALP SERPL-CCNC: 68 IU/L (ref 32–91)
ALT SERPL-CCNC: 30 IU/L (ref 17–63)
ANION GAP SERPL CALC-SCNC: 12.4 MMOL/L (ref 10–20)
AST SERPL-CCNC: 26 IU/L (ref 15–41)
BASOPHILS # BLD AUTO: 0 10*3/UL (ref 0–0.2)
BASOPHILS NFR BLD AUTO: 0 % (ref 0–2)
BILIRUB SERPL-MCNC: 0.6 MG/DL (ref 0.3–1.2)
BUN SERPL-MCNC: 18 MG/DL (ref 8–20)
BUN/CREAT SERPL: 13.8 (ref 6.2–20.3)
CALCIUM SERPL-MCNC: 9.5 MG/DL (ref 8.9–10.3)
CHLORIDE SERPL-SCNC: 99 MMOL/L (ref 101–111)
CONV CO2: 27 MMOL/L (ref 22–32)
CONV TOTAL PROTEIN: 7.2 G/DL (ref 6.1–7.9)
CREAT UR-MCNC: 1.3 MG/DL (ref 0.7–1.2)
DIFFERENTIAL METHOD BLD: (no result)
EOSINOPHIL # BLD AUTO: 0 % (ref 0–3)
EOSINOPHIL # BLD AUTO: 0 10*3/UL (ref 0–0.3)
ERYTHROCYTE [DISTWIDTH] IN BLOOD BY AUTOMATED COUNT: 14.4 % (ref 11.5–14.5)
GLOBULIN UR ELPH-MCNC: 2.8 G/DL (ref 2.5–3.8)
GLUCOSE SERPL-MCNC: 112 MG/DL (ref 65–99)
HCT VFR BLD AUTO: 50.6 % (ref 40–54)
HGB BLD-MCNC: 17 G/DL (ref 14–18)
LYMPHOCYTES # BLD AUTO: 2.9 10*3/UL (ref 0.8–4.8)
LYMPHOCYTES NFR BLD AUTO: 19 % (ref 18–42)
MAGNESIUM SERPL-MCNC: 2.1 MG/DL (ref 1.8–2.5)
MCH RBC QN AUTO: 29 PG (ref 26–32)
MCHC RBC AUTO-ENTMCNC: 33.7 G/DL (ref 32–36)
MCV RBC AUTO: 86 FL (ref 80–94)
MONOCYTES # BLD AUTO: 1.4 10*3/UL (ref 0.1–1.3)
MONOCYTES NFR BLD AUTO: 9 % (ref 2–11)
NEUTROPHILS # BLD AUTO: 10.8 10*3/UL (ref 2.3–8.6)
NEUTROPHILS NFR BLD AUTO: 72 % (ref 50–75)
NRBC BLD AUTO-RTO: 0 /100{WBCS}
NRBC/RBC NFR BLD MANUAL: 0 10*3/UL
PLATELET # BLD AUTO: 247 10*3/UL (ref 150–450)
PMV BLD AUTO: 8.9 FL (ref 7.4–10.4)
POTASSIUM SERPL-SCNC: 4.4 MMOL/L (ref 3.6–5.1)
RBC # BLD AUTO: 5.88 10*6/UL (ref 4.6–6)
SODIUM SERPL-SCNC: 134 MMOL/L (ref 136–144)
WBC # BLD AUTO: 15.2 10*3/UL (ref 4.5–11.5)

## 2018-05-21 ENCOUNTER — CLINICAL SUPPORT (OUTPATIENT)
Dept: ONCOLOGY | Facility: HOSPITAL | Age: 63
End: 2018-05-21

## 2018-05-21 ENCOUNTER — HOSPITAL ENCOUNTER (OUTPATIENT)
Dept: ONCOLOGY | Facility: CLINIC | Age: 63
Setting detail: INFUSION SERIES
Discharge: HOME | End: 2018-05-21
Attending: INTERNAL MEDICINE | Admitting: INTERNAL MEDICINE

## 2018-05-21 NOTE — PROGRESS NOTES
PATIENTS ONCOLOGY RECORD LOCATED IN [facility]      Subjective     Name:  NICHOLAS CRAMER     Date:  2018  Address:  [address] [place], [place] [address]  Home: [phone]  :  1955 AGE:  62 y.o.        RECORDS OBTAINED:  Patients Oncology Record is located in [facility]

## 2018-05-30 ENCOUNTER — HOSPITAL ENCOUNTER (OUTPATIENT)
Dept: ONCOLOGY | Facility: CLINIC | Age: 63
Setting detail: INFUSION SERIES
Discharge: HOME | End: 2018-05-30
Attending: INTERNAL MEDICINE | Admitting: INTERNAL MEDICINE

## 2018-05-30 ENCOUNTER — CLINICAL SUPPORT (OUTPATIENT)
Dept: ONCOLOGY | Facility: HOSPITAL | Age: 63
End: 2018-05-30

## 2018-05-30 NOTE — PROGRESS NOTES
PATIENTS ONCOLOGY RECORD LOCATED IN [facility]      Subjective     Name:  NICHOLAS CRAMER     Date:  2018  Address:  [address] [place], [place] [address]  Home: [phone]  :  1955 AGE:  62 y.o.        RECORDS OBTAINED:  Patients Oncology Record is located in [facility]

## 2018-06-06 ENCOUNTER — HOSPITAL ENCOUNTER (OUTPATIENT)
Dept: PHYSICAL THERAPY | Facility: HOSPITAL | Age: 63
Setting detail: RECURRING SERIES
Discharge: HOME | End: 2018-07-22
Attending: FAMILY MEDICINE | Admitting: FAMILY MEDICINE

## 2018-06-11 ENCOUNTER — HOSPITAL ENCOUNTER (OUTPATIENT)
Dept: ONCOLOGY | Facility: CLINIC | Age: 63
Setting detail: INFUSION SERIES
Discharge: HOME | End: 2018-06-11
Attending: INTERNAL MEDICINE | Admitting: INTERNAL MEDICINE

## 2018-06-11 ENCOUNTER — CLINICAL SUPPORT (OUTPATIENT)
Dept: ONCOLOGY | Facility: HOSPITAL | Age: 63
End: 2018-06-11

## 2018-06-11 NOTE — PROGRESS NOTES
PATIENTS ONCOLOGY RECORD LOCATED IN [facility]      Subjective     Name:  NICHOLAS CRAMER     Date:  2018  Address:  [address] [place], [place] [address]  Home: [phone]  :  1955 AGE:  62 y.o.        RECORDS OBTAINED:  Patients Oncology Record is located in [facility]

## 2018-06-25 ENCOUNTER — HOSPITAL ENCOUNTER (OUTPATIENT)
Dept: ONCOLOGY | Facility: HOSPITAL | Age: 63
Discharge: HOME | End: 2018-06-25
Attending: INTERNAL MEDICINE | Admitting: INTERNAL MEDICINE

## 2018-06-25 ENCOUNTER — CLINICAL SUPPORT (OUTPATIENT)
Dept: ONCOLOGY | Facility: HOSPITAL | Age: 63
End: 2018-06-25

## 2018-06-25 ENCOUNTER — HOSPITAL ENCOUNTER (OUTPATIENT)
Dept: ONCOLOGY | Facility: CLINIC | Age: 63
Setting detail: INFUSION SERIES
Discharge: HOME | End: 2018-06-25
Attending: FAMILY MEDICINE | Admitting: FAMILY MEDICINE

## 2018-06-25 LAB
ANION GAP SERPL CALC-SCNC: 13.2 MMOL/L (ref 10–20)
BUN SERPL-MCNC: 20 MG/DL (ref 8–20)
BUN/CREAT SERPL: 16.7 (ref 6.2–20.3)
CALCIUM SERPL-MCNC: 8.9 MG/DL (ref 8.9–10.3)
CHLORIDE SERPL-SCNC: 105 MMOL/L (ref 101–111)
CONV CO2: 20 MMOL/L (ref 22–32)
CREAT UR-MCNC: 1.2 MG/DL (ref 0.7–1.2)
GLUCOSE SERPL-MCNC: 209 MG/DL (ref 65–99)
POTASSIUM SERPL-SCNC: 4.2 MMOL/L (ref 3.6–5.1)
SODIUM SERPL-SCNC: 134 MMOL/L (ref 136–144)

## 2018-06-25 NOTE — PROGRESS NOTES
PATIENTS ONCOLOGY RECORD LOCATED IN [facility]      Subjective     Name:  NICHOLAS CRAMER     Date:  2018  Address:  [address] [place], [place] [address]  Home: [phone]  :  1955 AGE:  63 y.o.        RECORDS OBTAINED:  Patients Oncology Record is located in [facility]

## 2018-07-02 ENCOUNTER — HOSPITAL ENCOUNTER (OUTPATIENT)
Dept: ONCOLOGY | Facility: HOSPITAL | Age: 63
Discharge: HOME | End: 2018-07-02
Attending: NURSE PRACTITIONER | Admitting: NURSE PRACTITIONER

## 2018-07-02 ENCOUNTER — CLINICAL SUPPORT (OUTPATIENT)
Dept: ONCOLOGY | Facility: HOSPITAL | Age: 63
End: 2018-07-02

## 2018-07-02 ENCOUNTER — HOSPITAL ENCOUNTER (OUTPATIENT)
Dept: ONCOLOGY | Facility: CLINIC | Age: 63
Setting detail: INFUSION SERIES
Discharge: HOME | End: 2018-07-02
Attending: NURSE PRACTITIONER | Admitting: NURSE PRACTITIONER

## 2018-07-02 NOTE — PROGRESS NOTES
PATIENTS ONCOLOGY RECORD LOCATED IN [facility]      Subjective     Name:  NICHOLAS CRAMER     Date:  2018  Address:  [address] [place], [place] [address]  Home: [phone]  :  1955 AGE:  63 y.o.        RECORDS OBTAINED:  Patients Oncology Record is located in [facility]

## 2018-07-16 ENCOUNTER — CLINICAL SUPPORT (OUTPATIENT)
Dept: ONCOLOGY | Facility: HOSPITAL | Age: 63
End: 2018-07-16

## 2018-07-16 ENCOUNTER — HOSPITAL ENCOUNTER (OUTPATIENT)
Dept: ONCOLOGY | Facility: CLINIC | Age: 63
Setting detail: INFUSION SERIES
Discharge: HOME | End: 2018-07-16
Attending: INTERNAL MEDICINE | Admitting: INTERNAL MEDICINE

## 2018-07-16 NOTE — PROGRESS NOTES
PATIENTS ONCOLOGY RECORD LOCATED IN [facility]      Subjective     Name:  NICHOLAS CRAMER     Date:  2018  Address:  [facility] [id number]  Home: [phone]  :  1955 AGE:  63 y.o.        RECORDS OBTAINED:  Patients Oncology Record is located in [facility]

## 2018-07-23 ENCOUNTER — HOSPITAL ENCOUNTER (OUTPATIENT)
Dept: ONCOLOGY | Facility: CLINIC | Age: 63
Setting detail: INFUSION SERIES
Discharge: HOME | End: 2018-07-23
Attending: INTERNAL MEDICINE | Admitting: INTERNAL MEDICINE

## 2018-07-23 ENCOUNTER — CLINICAL SUPPORT (OUTPATIENT)
Dept: ONCOLOGY | Facility: HOSPITAL | Age: 63
End: 2018-07-23

## 2018-07-23 NOTE — PROGRESS NOTES
PATIENTS ONCOLOGY RECORD LOCATED IN [facility]      Subjective     Name:  NICHOLAS CRAMER     Date:  2018  Address:  [address] [place], [place] [address]  Home: [phone]  :  1955 AGE:  63 y.o.        RECORDS OBTAINED:  Patients Oncology Record is located in [facility]

## 2018-07-30 ENCOUNTER — CLINICAL SUPPORT (OUTPATIENT)
Dept: ONCOLOGY | Facility: HOSPITAL | Age: 63
End: 2018-07-30

## 2018-07-30 ENCOUNTER — HOSPITAL ENCOUNTER (OUTPATIENT)
Dept: ONCOLOGY | Facility: CLINIC | Age: 63
Setting detail: INFUSION SERIES
Discharge: HOME | End: 2018-07-30
Attending: INTERNAL MEDICINE | Admitting: INTERNAL MEDICINE

## 2018-07-30 NOTE — PROGRESS NOTES
PATIENTS ONCOLOGY RECORD LOCATED IN [facility]      Subjective     Name:  NICHOLAS CRAMER     Date:  2018  Address:  [address] [place], [place] [address]  Home: [phone]  :  1955 AGE:  63 y.o.        RECORDS OBTAINED:  Patients Oncology Record is located in [facility]

## 2018-08-10 ENCOUNTER — HOSPITAL ENCOUNTER (OUTPATIENT)
Dept: LAB | Facility: HOSPITAL | Age: 63
Discharge: HOME | End: 2018-08-10
Attending: FAMILY MEDICINE | Admitting: FAMILY MEDICINE

## 2018-08-10 LAB
ALBUMIN SERPL-MCNC: 3.9 G/DL (ref 3.5–4.8)
ALBUMIN/GLOB SERPL: 1.6 {RATIO} (ref 1–1.7)
ALP SERPL-CCNC: 59 IU/L (ref 32–91)
ALT SERPL-CCNC: 27 IU/L (ref 17–63)
ANION GAP SERPL CALC-SCNC: 10.9 MMOL/L (ref 10–20)
AST SERPL-CCNC: 19 IU/L (ref 15–41)
BILIRUB SERPL-MCNC: 0.8 MG/DL (ref 0.3–1.2)
BUN SERPL-MCNC: 20 MG/DL (ref 8–20)
BUN/CREAT SERPL: 14.3 (ref 6.2–20.3)
CALCIUM SERPL-MCNC: 9.1 MG/DL (ref 8.9–10.3)
CHLORIDE SERPL-SCNC: 105 MMOL/L (ref 101–111)
CONV CO2: 27 MMOL/L (ref 22–32)
CONV TOTAL PROTEIN: 6.3 G/DL (ref 6.1–7.9)
CREAT UR-MCNC: 1.4 MG/DL (ref 0.7–1.2)
GLOBULIN UR ELPH-MCNC: 2.4 G/DL (ref 2.5–3.8)
GLUCOSE SERPL-MCNC: 109 MG/DL (ref 65–99)
MAGNESIUM SERPL-MCNC: 2 MG/DL (ref 1.8–2.5)
POTASSIUM SERPL-SCNC: 3.9 MMOL/L (ref 3.6–5.1)
SODIUM SERPL-SCNC: 139 MMOL/L (ref 136–144)

## 2018-08-13 ENCOUNTER — CLINICAL SUPPORT (OUTPATIENT)
Dept: ONCOLOGY | Facility: HOSPITAL | Age: 63
End: 2018-08-13

## 2018-08-13 ENCOUNTER — HOSPITAL ENCOUNTER (OUTPATIENT)
Dept: ONCOLOGY | Facility: CLINIC | Age: 63
Setting detail: INFUSION SERIES
Discharge: HOME | End: 2018-08-13
Attending: INTERNAL MEDICINE | Admitting: INTERNAL MEDICINE

## 2018-08-13 NOTE — PROGRESS NOTES
PATIENTS ONCOLOGY RECORD LOCATED IN [facility]      Subjective     Name:  NICHOLAS CRAMER     Date:  2018  Address:  [address] [place], [place] [address]  Home: [phone]  :  1955 AGE:  63 y.o.        RECORDS OBTAINED:  Patients Oncology Record is located in [facility]

## 2018-08-28 ENCOUNTER — CLINICAL SUPPORT (OUTPATIENT)
Dept: ONCOLOGY | Facility: HOSPITAL | Age: 63
End: 2018-08-28

## 2018-08-28 ENCOUNTER — HOSPITAL ENCOUNTER (OUTPATIENT)
Dept: ONCOLOGY | Facility: CLINIC | Age: 63
Setting detail: INFUSION SERIES
Discharge: HOME | End: 2018-08-28
Attending: INTERNAL MEDICINE | Admitting: INTERNAL MEDICINE

## 2018-08-28 NOTE — PROGRESS NOTES
PATIENTS ONCOLOGY RECORD LOCATED IN [facility]      Subjective     Name:  NICHOLAS CRAMER     Date:  2018  Address:  [address] [place], [place] [address]  Home: [phone]  :  1955 AGE:  63 y.o.        RECORDS OBTAINED:  Patients Oncology Record is located in [facility]

## 2018-09-10 ENCOUNTER — HOSPITAL ENCOUNTER (OUTPATIENT)
Dept: ONCOLOGY | Facility: CLINIC | Age: 63
Setting detail: INFUSION SERIES
Discharge: HOME | End: 2018-09-10
Attending: INTERNAL MEDICINE | Admitting: INTERNAL MEDICINE

## 2018-09-10 ENCOUNTER — CLINICAL SUPPORT (OUTPATIENT)
Dept: ONCOLOGY | Facility: HOSPITAL | Age: 63
End: 2018-09-10

## 2018-09-10 ENCOUNTER — HOSPITAL ENCOUNTER (OUTPATIENT)
Dept: ONCOLOGY | Facility: HOSPITAL | Age: 63
Discharge: HOME | End: 2018-09-10
Attending: INTERNAL MEDICINE | Admitting: INTERNAL MEDICINE

## 2018-09-10 LAB
ALBUMIN SERPL-MCNC: 4.6 G/DL (ref 3.5–4.8)
ALBUMIN/GLOB SERPL: 1.7 {RATIO} (ref 1–1.7)
ALP SERPL-CCNC: 74 IU/L (ref 32–91)
ALT SERPL-CCNC: 30 IU/L (ref 17–63)
ANION GAP SERPL CALC-SCNC: 12.9 MMOL/L (ref 10–20)
AST SERPL-CCNC: 22 IU/L (ref 15–41)
BILIRUB SERPL-MCNC: 0.5 MG/DL (ref 0.3–1.2)
BUN SERPL-MCNC: 14 MG/DL (ref 8–20)
BUN/CREAT SERPL: 10.8 (ref 6.2–20.3)
CALCIUM SERPL-MCNC: 9.3 MG/DL (ref 8.9–10.3)
CHLORIDE SERPL-SCNC: 103 MMOL/L (ref 101–111)
CONV CO2: 26 MMOL/L (ref 22–32)
CONV TOTAL PROTEIN: 7.3 G/DL (ref 6.1–7.9)
CREAT UR-MCNC: 1.3 MG/DL (ref 0.7–1.2)
GLOBULIN UR ELPH-MCNC: 2.7 G/DL (ref 2.5–3.8)
GLUCOSE SERPL-MCNC: 92 MG/DL (ref 65–99)
POTASSIUM SERPL-SCNC: 3.9 MMOL/L (ref 3.6–5.1)
SODIUM SERPL-SCNC: 138 MMOL/L (ref 136–144)

## 2018-09-10 NOTE — PROGRESS NOTES
PATIENTS ONCOLOGY RECORD LOCATED IN [facility]      Subjective     Name:  NICHOLAS CRAMER     Date:  09/10/2018  Address:  [address] [id number]  Home: [phone]  :  1955 AGE:  63 y.o.        RECORDS OBTAINED:  Patients Oncology Record is located in [facility]

## 2018-09-14 ENCOUNTER — HOSPITAL ENCOUNTER (OUTPATIENT)
Dept: CARDIOLOGY | Facility: HOSPITAL | Age: 63
Discharge: HOME | End: 2018-09-14
Attending: INTERNAL MEDICINE | Admitting: INTERNAL MEDICINE

## 2018-10-08 ENCOUNTER — HOSPITAL ENCOUNTER (OUTPATIENT)
Dept: ONCOLOGY | Facility: CLINIC | Age: 63
Setting detail: INFUSION SERIES
Discharge: HOME | End: 2018-10-08
Attending: INTERNAL MEDICINE | Admitting: INTERNAL MEDICINE

## 2018-10-08 ENCOUNTER — CLINICAL SUPPORT (OUTPATIENT)
Dept: ONCOLOGY | Facility: HOSPITAL | Age: 63
End: 2018-10-08

## 2018-10-08 NOTE — PROGRESS NOTES
PATIENTS ONCOLOGY RECORD LOCATED IN [facility]      Subjective     Name:  NICHOLAS CRAMER     Date:  10/08/2018  Address:  [address] [id number]  Home: [phone]  :  1955 AGE:  63 y.o.        RECORDS OBTAINED:  Patients Oncology Record is located in [facility]

## 2018-11-12 ENCOUNTER — CLINICAL SUPPORT (OUTPATIENT)
Dept: ONCOLOGY | Facility: HOSPITAL | Age: 63
End: 2018-11-12

## 2018-11-12 ENCOUNTER — HOSPITAL ENCOUNTER (OUTPATIENT)
Dept: ONCOLOGY | Facility: CLINIC | Age: 63
Setting detail: INFUSION SERIES
Discharge: HOME | End: 2018-11-12
Attending: INTERNAL MEDICINE | Admitting: INTERNAL MEDICINE

## 2018-11-12 NOTE — PROGRESS NOTES
PATIENTS ONCOLOGY RECORD LOCATED IN [facility]      Subjective     Name:  NICHOLAS CRAMER     Date:  2018  Address:  [address] [place] [address] [place] [address]  Home: [unfilled]  :  1955 AGE:  63 y.o.        RECORDS OBTAINED:  Patients Oncology Record is located in [facility]

## 2018-12-06 ENCOUNTER — HOSPITAL ENCOUNTER (OUTPATIENT)
Dept: PREADMISSION TESTING | Facility: HOSPITAL | Age: 63
Discharge: HOME | End: 2018-12-06
Attending: UROLOGY | Admitting: UROLOGY

## 2018-12-06 LAB
ANION GAP SERPL CALC-SCNC: 10.8 MMOL/L (ref 10–20)
BASOPHILS # BLD AUTO: 0.1 10*3/UL (ref 0–0.2)
BASOPHILS NFR BLD AUTO: 1 % (ref 0–2)
BUN SERPL-MCNC: 16 MG/DL (ref 8–20)
BUN/CREAT SERPL: 12.3 (ref 6.2–20.3)
CALCIUM SERPL-MCNC: 9.4 MG/DL (ref 8.9–10.3)
CHLORIDE SERPL-SCNC: 102 MMOL/L (ref 101–111)
CONV CO2: 27 MMOL/L (ref 22–32)
CREAT UR-MCNC: 1.3 MG/DL (ref 0.7–1.2)
DIFFERENTIAL METHOD BLD: (no result)
EOSINOPHIL # BLD AUTO: 0.2 10*3/UL (ref 0–0.3)
EOSINOPHIL # BLD AUTO: 2 % (ref 0–3)
ERYTHROCYTE [DISTWIDTH] IN BLOOD BY AUTOMATED COUNT: 13.7 % (ref 11.5–14.5)
GLUCOSE SERPL-MCNC: 83 MG/DL (ref 65–99)
HCT VFR BLD AUTO: 46.7 % (ref 40–54)
HGB BLD-MCNC: 16.4 G/DL (ref 14–18)
LYMPHOCYTES # BLD AUTO: 2.6 10*3/UL (ref 0.8–4.8)
LYMPHOCYTES NFR BLD AUTO: 36 % (ref 18–42)
MCH RBC QN AUTO: 30.4 PG (ref 26–32)
MCHC RBC AUTO-ENTMCNC: 35.1 G/DL (ref 32–36)
MCV RBC AUTO: 86.6 FL (ref 80–94)
MONOCYTES # BLD AUTO: 0.8 10*3/UL (ref 0.1–1.3)
MONOCYTES NFR BLD AUTO: 10 % (ref 2–11)
NEUTROPHILS # BLD AUTO: 3.7 10*3/UL (ref 2.3–8.6)
NEUTROPHILS NFR BLD AUTO: 51 % (ref 50–75)
NRBC BLD AUTO-RTO: 0 /100{WBCS}
NRBC/RBC NFR BLD MANUAL: 0 10*3/UL
PLATELET # BLD AUTO: 130 10*3/UL (ref 150–450)
PMV BLD AUTO: 9.9 FL (ref 7.4–10.4)
POTASSIUM SERPL-SCNC: 3.8 MMOL/L (ref 3.6–5.1)
RBC # BLD AUTO: 5.39 10*6/UL (ref 4.6–6)
SODIUM SERPL-SCNC: 136 MMOL/L (ref 136–144)
WBC # BLD AUTO: 7.3 10*3/UL (ref 4.5–11.5)

## 2018-12-20 ENCOUNTER — HOSPITAL ENCOUNTER (OUTPATIENT)
Dept: GENERAL RADIOLOGY | Facility: HOSPITAL | Age: 63
Discharge: HOME | End: 2018-12-20
Attending: UROLOGY | Admitting: UROLOGY

## 2019-01-09 ENCOUNTER — HOSPITAL ENCOUNTER (OUTPATIENT)
Dept: ONCOLOGY | Facility: CLINIC | Age: 64
Setting detail: INFUSION SERIES
Discharge: HOME | End: 2019-01-09
Attending: INTERNAL MEDICINE | Admitting: INTERNAL MEDICINE

## 2019-01-09 ENCOUNTER — CLINICAL SUPPORT (OUTPATIENT)
Dept: ONCOLOGY | Facility: HOSPITAL | Age: 64
End: 2019-01-09

## 2019-01-09 ENCOUNTER — HOSPITAL ENCOUNTER (OUTPATIENT)
Dept: ONCOLOGY | Facility: HOSPITAL | Age: 64
Discharge: HOME | End: 2019-01-09
Attending: INTERNAL MEDICINE | Admitting: INTERNAL MEDICINE

## 2019-01-09 LAB — LDH SERPL-CCNC: 139 IU/L (ref 98–192)

## 2019-01-09 NOTE — PROGRESS NOTES
PATIENTS ONCOLOGY RECORD LOCATED IN [facility]      Subjective     Name:  NICHOLAS CRAMER     Date:  2019  Address:  [address] [place] [address] [place] [address]  Home: [unfilled]  :  1955 AGE:  63 y.o.        RECORDS OBTAINED:  Patients Oncology Record is located in [facility]

## 2019-03-19 ENCOUNTER — HOSPITAL ENCOUNTER (OUTPATIENT)
Dept: LAB | Facility: HOSPITAL | Age: 64
Discharge: HOME | End: 2019-03-19
Attending: FAMILY MEDICINE | Admitting: FAMILY MEDICINE

## 2019-03-19 LAB
ALBUMIN SERPL-MCNC: 3.9 G/DL (ref 3.5–4.8)
ALBUMIN/GLOB SERPL: 1.4 {RATIO} (ref 1–1.7)
ALP SERPL-CCNC: 78 IU/L (ref 32–91)
ALT SERPL-CCNC: 26 IU/L (ref 17–63)
ANION GAP SERPL CALC-SCNC: 14.3 MMOL/L (ref 10–20)
AST SERPL-CCNC: 21 IU/L (ref 15–41)
BILIRUB SERPL-MCNC: 1 MG/DL (ref 0.3–1.2)
BUN SERPL-MCNC: 13 MG/DL (ref 8–20)
BUN/CREAT SERPL: 10 (ref 6.2–20.3)
CALCIUM SERPL-MCNC: 9 MG/DL (ref 8.9–10.3)
CHLORIDE SERPL-SCNC: 101 MMOL/L (ref 101–111)
CHOLEST SERPL-MCNC: 153 MG/DL
CHOLEST/HDLC SERPL: 3.5 {RATIO}
CONV CO2: 27 MMOL/L (ref 22–32)
CONV LDL CHOLESTEROL DIRECT: 105 MG/DL (ref 0–100)
CONV MICROALBUM.,U,RANDOM: 3 MG/L
CONV TOTAL PROTEIN: 6.7 G/DL (ref 6.1–7.9)
CREAT UR-MCNC: 1.3 MG/DL (ref 0.7–1.2)
GLOBULIN UR ELPH-MCNC: 2.8 G/DL (ref 2.5–3.8)
GLUCOSE SERPL-MCNC: 128 MG/DL (ref 65–99)
HDLC SERPL-MCNC: 44 MG/DL
LDLC/HDLC SERPL: 2.4 {RATIO}
LIPID INTERPRETATION: ABNORMAL
MAGNESIUM SERPL-MCNC: 2 MG/DL (ref 1.8–2.5)
POTASSIUM SERPL-SCNC: 4.3 MMOL/L (ref 3.6–5.1)
SODIUM SERPL-SCNC: 138 MMOL/L (ref 136–144)
TRIGL SERPL-MCNC: 94 MG/DL
VLDLC SERPL CALC-MCNC: 3.8 MG/DL

## 2019-04-10 ENCOUNTER — CLINICAL SUPPORT (OUTPATIENT)
Dept: ONCOLOGY | Facility: HOSPITAL | Age: 64
End: 2019-04-10

## 2019-04-10 ENCOUNTER — HOSPITAL ENCOUNTER (OUTPATIENT)
Dept: ONCOLOGY | Facility: HOSPITAL | Age: 64
Discharge: HOME | End: 2019-04-10
Attending: NURSE PRACTITIONER | Admitting: NURSE PRACTITIONER

## 2019-04-10 ENCOUNTER — HOSPITAL ENCOUNTER (OUTPATIENT)
Dept: ONCOLOGY | Facility: CLINIC | Age: 64
Setting detail: INFUSION SERIES
Discharge: HOME | End: 2019-04-10
Attending: NURSE PRACTITIONER | Admitting: NURSE PRACTITIONER

## 2019-04-10 NOTE — PROGRESS NOTES
PATIENTS ONCOLOGY RECORD LOCATED IN [facility]      Subjective     Name:  NICHOLAS CRAMER     Date:  04/10/2019  Address:  [address]  [place] [address]  Home: [unfilled]  :  1955 AGE:  63 y.o.        RECORDS OBTAINED:  Patients Oncology Record is located in [facility]

## 2019-04-17 ENCOUNTER — CLINICAL SUPPORT (OUTPATIENT)
Dept: ONCOLOGY | Facility: HOSPITAL | Age: 64
End: 2019-04-17

## 2019-04-17 ENCOUNTER — HOSPITAL ENCOUNTER (OUTPATIENT)
Dept: ONCOLOGY | Facility: CLINIC | Age: 64
Setting detail: INFUSION SERIES
Discharge: HOME | End: 2019-04-17
Attending: INTERNAL MEDICINE | Admitting: INTERNAL MEDICINE

## 2019-04-17 ENCOUNTER — HOSPITAL ENCOUNTER (OUTPATIENT)
Dept: ONCOLOGY | Facility: HOSPITAL | Age: 64
Discharge: HOME | End: 2019-04-17
Attending: NURSE PRACTITIONER | Admitting: NURSE PRACTITIONER

## 2019-04-17 NOTE — PROGRESS NOTES
Carol Buitrago is a 25 y.o.  at 27w5d here today for obstetrical visit.  Patient is without complaints.    She reports good fetal movement.  She denies vaginal bleeding.  She denies rupture of membranes.  She denies contractions.     has Normal pregnancy in second trimester on her problem list.        A/P IUP at 27w5d  AFP done  1 hour glucola   Rhogam o pos  GBS     F/U in 2 weeks     PATIENTS ONCOLOGY RECORD LOCATED IN [facility]      Subjective     Name:  NICHOLAS CRAMER     Date:  2019  Address:  [address]  [place] [address]  Home: [unfilled]  :  1955 AGE:  63 y.o.        RECORDS OBTAINED:  Patients Oncology Record is located in [facility]

## 2019-04-24 LAB
RBC ENZY PNL RBC: NORMAL

## 2019-05-22 ENCOUNTER — CLINICAL SUPPORT (OUTPATIENT)
Dept: ONCOLOGY | Facility: HOSPITAL | Age: 64
End: 2019-05-22

## 2019-05-22 ENCOUNTER — HOSPITAL ENCOUNTER (OUTPATIENT)
Dept: ONCOLOGY | Facility: CLINIC | Age: 64
Setting detail: INFUSION SERIES
Discharge: HOME | End: 2019-05-22
Attending: INTERNAL MEDICINE | Admitting: INTERNAL MEDICINE

## 2019-05-22 NOTE — PROGRESS NOTES
PATIENTS ONCOLOGY RECORD LOCATED IN [facility]      Subjective     Name:  NICHOLAS CRAMER     Date:  2019  Address:  [address]  [place] [address]  Home: [unfilled]  :  1955 AGE:  63 y.o.        RECORDS OBTAINED:  Patients Oncology Record is located in [facility]

## 2019-07-09 DIAGNOSIS — I82.4Y2 DEEP VEIN THROMBOSIS (DVT) OF PROXIMAL VEIN OF LEFT LOWER EXTREMITY, UNSPECIFIED CHRONICITY (HCC): Primary | ICD-10-CM

## 2019-07-10 ENCOUNTER — OFFICE VISIT (OUTPATIENT)
Dept: ONCOLOGY | Facility: CLINIC | Age: 64
End: 2019-07-10

## 2019-07-10 ENCOUNTER — APPOINTMENT (OUTPATIENT)
Dept: LAB | Facility: HOSPITAL | Age: 64
End: 2019-07-10

## 2019-07-10 VITALS
WEIGHT: 315 LBS | HEART RATE: 64 BPM | DIASTOLIC BLOOD PRESSURE: 88 MMHG | RESPIRATION RATE: 16 BRPM | SYSTOLIC BLOOD PRESSURE: 161 MMHG | BODY MASS INDEX: 44.1 KG/M2 | HEIGHT: 71 IN | TEMPERATURE: 97.7 F

## 2019-07-10 DIAGNOSIS — Z53.21 PATIENT LEFT AFTER TRIAGE: Primary | ICD-10-CM

## 2019-07-10 DIAGNOSIS — Z53.21 PATIENT LEFT WITHOUT BEING SEEN: ICD-10-CM

## 2019-07-10 PROBLEM — I82.402 RECURRENT DEEP VEIN THROMBOSIS (DVT) OF LEFT LOWER EXTREMITY (HCC): Status: ACTIVE | Noted: 2019-07-10

## 2019-07-10 PROBLEM — D68.51 HETEROZYGOUS FACTOR V LEIDEN MUTATION (HCC): Status: ACTIVE | Noted: 2019-07-10

## 2019-07-10 PROBLEM — D69.3 CHRONIC ITP (IDIOPATHIC THROMBOCYTOPENIC PURPURA): Status: ACTIVE | Noted: 2019-07-10

## 2019-07-10 PROBLEM — E66.9 OBESITY: Status: ACTIVE | Noted: 2019-07-10

## 2019-07-10 PROBLEM — N18.9 CKD (CHRONIC KIDNEY DISEASE): Status: ACTIVE | Noted: 2019-07-10

## 2019-07-10 LAB
BASOPHILS # BLD AUTO: 0.02 10*3/MM3 (ref 0–0.2)
BASOPHILS NFR BLD AUTO: 0.3 % (ref 0–1.5)
DEPRECATED RDW RBC AUTO: 42.9 FL (ref 37–54)
EOSINOPHIL # BLD AUTO: 0.23 10*3/MM3 (ref 0–0.4)
EOSINOPHIL NFR BLD AUTO: 3.3 % (ref 0.3–6.2)
ERYTHROCYTE [DISTWIDTH] IN BLOOD BY AUTOMATED COUNT: 14.1 % (ref 12.3–15.4)
HCT VFR BLD AUTO: 49.8 % (ref 37.5–51)
HGB BLD-MCNC: 17 G/DL (ref 13–17.7)
LYMPHOCYTES # BLD AUTO: 2.69 10*3/MM3 (ref 0.7–3.1)
LYMPHOCYTES NFR BLD AUTO: 38.6 % (ref 19.6–45.3)
MCH RBC QN AUTO: 28.7 PG (ref 26.6–33)
MCHC RBC AUTO-ENTMCNC: 34.1 G/DL (ref 31.5–35.7)
MCV RBC AUTO: 84 FL (ref 79–97)
MONOCYTES # BLD AUTO: 0.7 10*3/MM3 (ref 0.1–0.9)
MONOCYTES NFR BLD AUTO: 10.1 % (ref 5–12)
NEUTROPHILS # BLD AUTO: 3.32 10*3/MM3 (ref 1.7–7)
NEUTROPHILS NFR BLD AUTO: 47.7 % (ref 42.7–76)
PLATELET # BLD AUTO: 109 10*3/MM3 (ref 140–450)
PMV BLD AUTO: 11.2 FL (ref 6–12)
RBC # BLD AUTO: 5.93 10*6/MM3 (ref 4.14–5.8)
WBC NRBC COR # BLD: 6.96 10*3/MM3 (ref 3.4–10.8)

## 2019-07-10 PROCEDURE — 85025 COMPLETE CBC W/AUTO DIFF WBC: CPT | Performed by: INTERNAL MEDICINE

## 2019-07-10 PROCEDURE — 36415 COLL VENOUS BLD VENIPUNCTURE: CPT | Performed by: INTERNAL MEDICINE

## 2019-07-10 RX ORDER — RANITIDINE 150 MG/1
CAPSULE ORAL
COMMUNITY
Start: 2018-07-10 | End: 2020-05-18

## 2019-07-10 RX ORDER — PRAVASTATIN SODIUM 80 MG/1
80 TABLET ORAL NIGHTLY
COMMUNITY
Start: 2018-06-21

## 2019-07-10 RX ORDER — DILTIAZEM HYDROCHLORIDE 240 MG/1
1 CAPSULE, EXTENDED RELEASE ORAL EVERY 24 HOURS
COMMUNITY
Start: 2018-07-10

## 2019-07-10 RX ORDER — METFORMIN HYDROCHLORIDE EXTENDED-RELEASE TABLETS 500 MG/1
TABLET, FILM COATED, EXTENDED RELEASE ORAL EVERY 24 HOURS
COMMUNITY
Start: 2018-07-10

## 2019-07-10 RX ORDER — ISOSORBIDE MONONITRATE 30 MG/1
TABLET, EXTENDED RELEASE ORAL EVERY 24 HOURS
COMMUNITY
Start: 2014-02-10 | End: 2021-07-26 | Stop reason: DRUGHIGH

## 2019-07-10 RX ORDER — FUROSEMIDE 40 MG/1
TABLET ORAL
COMMUNITY
Start: 2018-07-10

## 2019-07-10 RX ORDER — POTASSIUM CHLORIDE 1.5 G/1.77G
20 POWDER, FOR SOLUTION ORAL DAILY
COMMUNITY
Start: 2018-07-10

## 2019-07-10 NOTE — PROGRESS NOTES
Patient left before being seen      Hematology/Oncology Outpatient Follow Up    Patient name:Seth Torres  :1955  MRN: [id number]  Primary Care Physician: Myra Tello MD  Referring Physician: Myra Tello MD    No chief complaint on file.    Patient left before being seen    History of Present Illness:   1. Recurrent left lower extremity deep venous thrombosis diagnosed 2017.   • 17 - Patient was hospitalized at [facility] between 17 and 17 with spontaneous left lower extremity edema upon waking up. He gave a history of deep venous thrombosis of the left lower extremity in  after ACL repair, treated at that time with one year of Warfarin followed by newer anticoagulants near the end of that year, although I am unsure if newer oral anticoagulants were available at that time. Doppler was performed the day of admission and was positive for extensive DVT as well as superficial thrombosis of the left lower extremity. He was started on Heparin drip and Vascular Surgery was consulted. IVC filter and thrombectomy was performed on 17 with acute thrombus in the left popliteal and the femoral veins with more chronic thrombus in the iliac vein noted intraoperatively. Heparin was changed to Lovenox and thrombophilia workup was ordered by Hai Gamino M.D. Factor V Leiden screen was low at 1.1 (>2). Hematology consultation was obtained and additional workup ordered. His hemoglobin was trending in the 17 g range.  Factor VIII was 181% (). Comprehensive metabolic panel had no significant abnormality except for a total bilirubin of 1.4 (0.3-1.2). Homocysteine was 9 (0-13). Antiphospholipid antibodies were negative. Lupus anticoagulant was negative. Prothrombin gene mutation was negative. Factor V Leiden was positive for one copy of the R506Q mutation. Protein C activity was 100.1% () and protein S activity 81.2 (). Chest x-ray had no active cardiopulmonary disease.  Antithrombin III was not ordered pending patient coming off heparinoids. Patient was discharged on oral Eliquis. His platelet count was 155,000 on admission and 137,000 on discharge.   • 2/28/17 - WBC 5.3, hemoglobin 16.1, platelet count 178,000.   • 3/6/17 - [facility] at [facility]. WBC 7.3, hemoglobin 15, platelet count 194,000. The patient claims to be having daily fevers and is scheduled to have IVC filter removed by Dr. Gamino, which Dr. Gamino thinks he may be allergic to. Counseled the patient regarding having his siblings and children tested for factor V Leiden. Went over risks with acquired factors in addition to genetic factors. Factor VIII activity 191% (), antithrombin III activity 86% ().     • 3/7/17 - Patient underwent retrieval of inferior vena cava filter by Hai Gamino M.D. as patient was having recurrent low-grade fever which was thought to be either from DVT or possibly the vena cava filter. Eliquis was held for one day.   • 3/16/17 - Lower extremity venous Dopplers with subacute left femoral and popliteal DVT without significant change from prior exam.   • 4/3/17 - WBC 6.3, hemoglobin 15.1, platelet count 193,000. Patient claims to have had dental work done while on Eliquis. Asked him to stop it for a day when requiring additional dental work.   • 7/10/17 - WBC 7.7, hemoglobin 16.7, platelet count 45,000. Patient was a difficult stick. Repeat ordered for next day. Comprehensive metabolic panel with no significant abnormality. Factor VIII activity 130% (). D-dimer 0.73 (<0.45).     • 8/15/17 - Eliquis 5 mg b.i.d. resumed. Prescribed Zantac 150 mg p.o. daily for GERD prophylaxis due to chronic Mobic daily.   • 9/22/17 - Patient is to continue with the Eliquis 5 mg p.o. b.i.d. Patient did have a couple of episodes of gum bleeds but denied any other bleeding and those episodes only lasted for a short period of  time. Denied any signs or symptoms of recurrent DVT.   • 10/5/17 - Factor VIII activity 133 ().    • 12/6/17 - Continue Eliquis 5 mg b.i.d. Instructed to hold dose any time his platelets are <50,000.   • 1/5/18 - Factor VIII activity 94% ().    • 9/10/18 - D-dimer 0.28 (<0.45).   • 9/14/18 - BLE Doppler showed chronic DVT along the left common femoral vein and femoral vein unchanged from prior.    • 11/22/18 - During ED visit for left flank pain CT abdomen and pelvis showed 4 mm obstructing stone in the left ureterovesical junction resulting in moderate left hydroureteronephrosis. Several non-obstructing left renal stones were present. Sigmoid diverticulosis was present. There were prominent vessels in the left inguinal region with diminutive size of the left common iliac and left external iliac veins suggesting chronic stricture/stenosis of these veins.     • 1/9/19 - Eliquis dose decreased to 2.5 mg p.o. b.i.d. as maintenance.   2.   ITP diagnosed July 2017.   • 7/10/17 - WBC 7.7, hemoglobin 16.7, platelet count 45,000. Folate 17.5. ARACELI screen negative. EBV IgM 2.7 (<0.9), EBV IgG >8 (<0.9). CMV IgM 0.66 (<0.91), CMV IgG 12.6 (<0.9). Vitamin B12 of 400 (211-911),  (). PT PTT 13.5 and 30.6. Platelet antibodies detected for HLA only.   • 7/17/17 - WBC 7.2, hemoglobin 16.5, platelet count 33,000. Patient asked to hold Eliquis and started on Danazol 200 mg p.o. t.i.d. after discussion of risks and benefits of the several treatments.   • 7/21/17 -   • 8/7/17 - WBC 6.3, hemoglobin 16.1, platelet count 11,000. Danazol discontinued and patient asked to restart Simvastatin as it was stopped because of interaction between the two drugs. Order written to transfuse 1 unit of platelets today and start IV IgG 1 g/kg daily x2 days STAT.   • 8/8/17 - Received 1 unit of platelets for a platelet count of 16,000. Ferritin 226. EBV IgM positive 2.6 (H). H-pylori IgM 0.18 (N), h-pylori antibody IgG 0.15 (N).  IgG >8 (H).   • 8/10/17 - WBC 9.5, hemoglobin 15.6, platelet count 29,000.   • 8/10/17  and 8/11/17 - 150 g IV IG given daily for a total dose of 2 g/kg.   • 8/11/17 - White count 7.8, hemoglobin 15.0, MCV 84.5 and platelet count 135,000.   • 8/15/17 - White count 4.83, hemoglobin 15.2, MCV 84.6 and platelet count 254,000. Hepatic function panel ordered.   • 9/5/17 - Order written for IV IgG 1 g/kg x1 dose for platelets <100,000 and also a weekly CBC.   • 9/8/17 - Patient was administered IV IgG 150 g. Platelet count 37,000.   • 9/12/17 - Platelet count 127,000. Patient received Eliquis.   • 9/22/17 - WBC 6.2, hemoglobin 16.0, platelet count 108,000. EBV IgM 1.4 (<0.9), EBV IgG >8 (<0.9).   • 10/6/17 - Patient received IV IgG 150 g with platelet count of 14,000 on 10/5/17.   • 10/24/17 - Platelet count 82,000. Nplate dose given 150 mcg subq at 1 mc/kg.   • 10/31/17 - Platelet count 102,000.   • 11/7/17 - Patient received Nplate 1 mcg/kg subq. Platelet count 69,000.   • 11/14/17 - Nplate 1 mcg/kg given for platelet count 52,000.   • 11/21/17 - Nplate 2 mcg/kg given for platelet count 48,000.   • 11/28/17 - Nplate held for platelet count 159,000.   • 12/5/17 - Nplate 2 mcg/kg given for a platelet count 57,000.    • 12/6/17 - IgA 214 (), IgG 1080 (600-1500), IgM 51 (). EBV IgM 1.5 (<0.9).       • 12/12/17 - Nplate 2 mcg/kg given for platelet count 129,000.   • 12/19/17 - Nplate held for platelet cont 207,000.   • 12/26/17 - Nplate held for platelet count 296,000.   • 1/3/18 - Nplate 2 mcg/kg given for platelet count 99,000.   • 1/5/18 - Discussed options of trying Promacta with the patient. He wants to stay with the Nplate at present with change in dosage to 2 mcg/kg subq every two weeks, looking at a pattern of his platelet count response.   • 1/24/18 - Nplate held for platelet count 169,000.   • 2/5/18 - Platelets 49,000. Patient recovering from flu and pneumonia on Prednisone and Levaquin. Orders written  to continue Nplate 2 mcg/kg subq every two weeks. Hold Nplate for platelets >100.000. CBC to be checked one week post hold of any dosing with scheduling every two weeks from that point. Orders written to notify M.D./N.P. for Nplate dosing when platelets are <50,000. Orders written to hold Eliquis when platelets are <50,000.   • 2/26/18 - Platelet count 104,000. Nplate changed to 1 mcg/kg subq every two weeks.   • 4/4/18 - Chart reviewed from last visit of 3/5/18 forward with noted Nplate administered on 3/19/18 for platelet count of 124,000 and all other dosing held due to platelets above 150,000. Patient is concurrently on Prednisone 10 mg by mouth daily.   • 4/16/18 - Platelet count 85,000. Nplate 1 mcg/kg given.   • 5/2/18 - Platelet count 233,000. Nplate dose held. Order written to continue Nplate 1 mcg/kg subq every two weeks and to hold for platelets >150,000. In case dose held, order written to repeat CBC one week later. Discussed other available options of treatment with patient again. EBV DNA <200 (<200).   • 6/14/18 - Patient hospitalized at [facility] 6/14/18 through 6/20/18 by Dr. Tello with community-acquired pneumonia and atrial fibrillation. Chest x-ray 6/15/18 was negative for acute processes. CT chest without contrast, however, showed dense consolidation of left lower lobe with air bronchograms consistent with pneumonia. Calcified coronary artery disease was present. Enlarged left hilar lymph nodes were likely reactive. He underwent renal ultrasound which was unremarkable. He was started on IV antibiotics and ID was consulted. He developed acute-on-chronic renal failure with creatinine rising to 2.4 on 6/18/18. Nephrology was consulted and creatinine improved to 1.5 on 6/20/18. CBC 6/15/18 revealed WBC 15.0, hemoglobin 17.2 and platelets 158,000. Platelets remained normal throughout the hospitalization and CBC on 6/20/18 showed WBC 7.7, hemoglobin 14.1 and platelets 249,000. ARACELI was negative on 6/16/18.  Blood cultures and urine cultures were negative. Sputum culture showed mixed luzmaria. Tick panel was negative. Respiratory viral panel was negative. Echocardiogram showed LVEF 60% and a normal Doppler study. He developed AFib and required a Cardizem drip which was not effective. He subsequently received Amiodarone drip with conversion to normal sinus rhythm and prescribed Amiodarone on discharge. He was referred to Dr. Grier for sleep apnea workup as an outpatient.   • 7/2/18 - Reviewed CBC’s from 5/2/18 through 7/2/18 with Nplate dosed 150 mcg on 5/7/18 and 5/30/18 with lowest platelet count 143,000 on 5/30/18. Orders written to continue CBC every two weeks and orders written to decrease Nplate to 1 mg/kg subcutaneous injection monthly when platelets are <100,000.   • 9/10/18 - As patient’s last dose Nplate was in April 2018 and patient has had a platelet count >100,0000 since then, Nplate discontinued with a platelet count of 128,000 today.   3.   Iron deficiency diagnosed July 2017.   • 7/21/17 - Bone marrow aspiration with normocellular bone marrow (50%) with absent iron stores. Flow cytometry had no aberrant immunophenotypic finding. Cytogenetics revealed 47 XY normal male karyotype.    • 8/8/17 - Ferritin 226.   • 8/15/17 - Zantac 150 mg p.o. daily. Patient reports stool cards June 2017 at primary care physician’s negative for blood. Taking daily multivitamin.   • 9/22/17 - WBC 6.2, hemoglobin 16.0, hematocrit 46.8, MCV 83.0, platelet count 108,000.   • 10/5/17 - Serum protein electrophoresis with hypoalbuminemia.   • 12/6/17 - Iron 72 (), TIBC 360 (228-428), iron saturation 20 (20-50), ferritin 58 (), creatinine 1.3 (0.7-1.2).        4.   Polycythemia diagnosed January 2019.   • 1/9/19 - Hemoglobin 17.0, hematocrit 50.1. Patient has obstructive sleep apnea and has been on CPAP continuously since September 2018. O2 sat on room air 97%. Hemoglobin electrophoresis with normal hemoglobin pattern.  Erythropoietin 4.16 (2.59-18.5) and  (). Vitamin B12 of 422 (211-911).         Past Medical History:   Diagnosis Date   • CAD (coronary artery disease) 1997   • CKD (chronic kidney disease), stage III (CMS/HCC) 06/2018    Dr. West   • Diverticulosis 10/2016   • DJD (degenerative joint disease)    • DVT, lower extremity, recurrent, left (CMS/HCC) 02/2017   • Elevated factor VIII level 2018   • Hyperlipidemia 1997   • Hypertension 1997   • Pneumonia 2018    January 2018 and June 2018   • Renal calculi 09/2016   • Sleep apnea 2018    Dr. Grier       Past Surgical History:   Procedure Laterality Date   • CYSTOSCOPY URETEROSCOPY Left 12/10/2018    Dr. Ty   • HAND SURGERY Left     Ganglion cyst removed palm of left hand   • REPLACEMENT TOTAL KNEE Bilateral     one in 2001 and the other in 2002   • SEPTOPLASTY      deviated septum       No current outpatient medications on file.    Allergies not on file    Family History   Problem Relation Age of Onset   • Lymphoma Brother 42        Non Hodgkins lymphoma   • Stroke Brother    • Heart attack Other         Extensive MI History on Paternal side       Cancer-related family history includes Lymphoma (age of onset: 42) in his brother.    Social History     Tobacco Use   • Smoking status: Former Smoker     Packs/day: 1.50     Years: 2.00     Pack years: 3.00     Types: Cigarettes   • Tobacco comment: smoked one and a half packs of cigarettes a day for two years quitting at age 36   Substance Use Topics   • Alcohol use: Yes     Alcohol/week: 1.2 oz     Types: 2 Cans of beer per week     Comment: drinks two beers monthly   • Drug use: Not on file       I have reviewed the history of present illness, past medical history, family history, social history, lab results, all notes and other records since the patient was last seen on 1/9/19.    SUBJECTIVE: Patient is here for follow up of ITP and recurrent LLE DVT's.     HODAN Rand present during office visit.          ROS:  Review of Systems   Constitutional: Negative for chills and fever.   HENT: Negative for ear pain, mouth sores, nosebleeds and sore throat.    Eyes: Negative for photophobia and visual disturbance.   Respiratory: Negative for wheezing and stridor.    Cardiovascular: Negative for chest pain and palpitations.   Gastrointestinal: Negative for abdominal pain, diarrhea, nausea and vomiting.   Endocrine: Negative for cold intolerance and heat intolerance.   Genitourinary: Negative for dysuria and hematuria.   Musculoskeletal: Negative for joint swelling and neck stiffness.   Skin: Negative for color change and rash.   Neurological: Negative for seizures and syncope.   Hematological: Negative for adenopathy.        No obvious bleeding   Psychiatric/Behavioral: Negative for agitation, confusion and hallucinations.       Objective:    There were no vitals filed for this visit.    ECOG  (0) Fully active, able to carry on all predisease performance without restriction    Physical Exam    RECENT LABS  WBC   Date Value Ref Range Status   12/06/2018 7.3 4.5 - 11.5 10*3/uL Final     RBC   Date Value Ref Range Status   12/06/2018 5.39 4.60 - 6.00 10*6/uL Final     Hemoglobin   Date Value Ref Range Status   12/06/2018 16.4 14.0 - 18.0 g/dL Final     Hematocrit   Date Value Ref Range Status   12/06/2018 46.7 40 - 54 % Final     MCV   Date Value Ref Range Status   12/06/2018 86.6 80 - 94 fL Final     MCH   Date Value Ref Range Status   12/06/2018 30.4 26 - 32 pg Final     MCHC   Date Value Ref Range Status   12/06/2018 35.1 32 - 36 g/dL Final     RDW   Date Value Ref Range Status   12/06/2018 13.7 11.5 - 14.5 % Final     MPV   Date Value Ref Range Status   12/06/2018 9.9 7.4 - 10.4 fL Final     Platelets   Date Value Ref Range Status   12/06/2018 130 (L) 150 - 450 10*3/uL Final     Neutrophil Rel %   Date Value Ref Range Status   12/06/2018 51 50 - 75 % Final     Lymphocyte Rel %   Date Value Ref Range Status   12/06/2018  36 18 - 42 % Final     Monocyte Rel %   Date Value Ref Range Status   12/06/2018 10 2 - 11 % Final     Eosinophil Rel %   Date Value Ref Range Status   12/06/2018 2 0 - 3 % Final     Basophil Rel %   Date Value Ref Range Status   12/06/2018 1 0 - 2 % Final     Neutrophils Absolute   Date Value Ref Range Status   12/06/2018 3.7 2.3 - 8.6 10*3/uL Final     Lymphocytes Absolute   Date Value Ref Range Status   12/06/2018 2.6 0.8 - 4.8 10*3/uL Final     Monocytes Absolute   Date Value Ref Range Status   12/06/2018 0.8 0.1 - 1.3 10*3/uL Final     Eosinophils Absolute   Date Value Ref Range Status   12/06/2018 0.2 0.0 - 0.3 10*3/uL Final     Basophils Absolute   Date Value Ref Range Status   12/06/2018 0.1 0 - 0.2 10*3/uL Final     nRBC   Date Value Ref Range Status   12/06/2018 0 0 /100[WBCs] Final       Lab Results   Component Value Date    GLUCOSE 128 (H) 03/19/2019    BUN 13 03/19/2019    CREATININE 1.3 (H) 03/19/2019    BCR 10.0 03/19/2019    K 4.3 03/19/2019    CO2 27 03/19/2019    CALCIUM 9.0 03/19/2019    ALBUMIN 3.9 03/19/2019    LABIL2 1.4 03/19/2019    AST 21 03/19/2019    ALT 26 03/19/2019         Assessment/Plan     There are no diagnoses linked to this encounter.    ASSESSMENT:    PLAN:    I have reviewed labs results, imaging, vitals, and medications with the patient today. Will follow up in 1 months with ME.     Patient verbalized understanding and is in agreement of the above plan.    I have reviewed and agree with the above information.   Chris Ortiz M.D., F.A.C.P.          Much of the above report is an electronic transcription//translation of the spoken language to printed text using Dragon Software. As such, the subtleties and finesse of the spoken language may permit erroneous, or at times, nonsensical words or phrases to be inadvertently transcribed; thus changes may be made at a later date to rectify these errors.

## 2019-07-11 ENCOUNTER — TELEPHONE (OUTPATIENT)
Dept: ONCOLOGY | Facility: CLINIC | Age: 64
End: 2019-07-11

## 2019-07-11 NOTE — TELEPHONE ENCOUNTER
Patient called requesting CBC results from yesterday as he was not able to see Dr. Ortiz.  Chart reviewed.  Attempted to contact patient, however, had to LM on VM asking patient to call me back.  morro Hartman

## 2019-07-16 ENCOUNTER — TELEPHONE (OUTPATIENT)
Dept: ONCOLOGY | Facility: CLINIC | Age: 64
End: 2019-07-16

## 2019-07-16 NOTE — TELEPHONE ENCOUNTER
Pt called 7/15 to reschedule 7/10 appt as he had to leave before being seen due to scheduling conflict.  Returned call and scheduled with NP for 7/29.  Had question about platelet count.  Offered to forward call to triage, but he said he would call if he noticed problems.

## 2019-07-25 PROBLEM — E61.1 IRON DEFICIENCY: Status: ACTIVE | Noted: 2019-07-25

## 2019-07-25 PROBLEM — D75.1 POLYCYTHEMIA: Status: ACTIVE | Noted: 2019-07-25

## 2019-07-25 NOTE — PROGRESS NOTES
Hematology/Oncology Outpatient Follow Up    PATIENT NAME:Seth Torres  :1955  MRN: [id number]  PRIMARY CARE PHYSICIAN: Myra Tello MD  REFERRING PHYSICIAN: Myra Tello MD    Chief Complaint   Patient presents with   • Follow-up     ITP      HISTORY OF PRESENT ILLNESS:   . Recurrent left lower extremity deep venous thrombosis diagnosed 2017.   · 17 - Patient was hospitalized at [facility] between 17 and 17 with spontaneous left lower extremity edema upon waking up. He gave a history of deep venous thrombosis of the left lower extremity in  after ACL repair, treated at that time with one year of Warfarin followed by newer anticoagulants near the end of that year, although I am unsure if newer oral anticoagulants were available at that time. Doppler was performed the day of admission and was positive for extensive DVT as well as superficial thrombosis of the left lower extremity. He was started on Heparin drip and Vascular Surgery was consulted. IVC filter and thrombectomy was performed on 17 with acute thrombus in the left popliteal and the femoral veins with more chronic thrombus in the iliac vein noted intraoperatively. Heparin was changed to Lovenox and thrombophilia workup was ordered by Hai Gamino M.D. Factor V Leiden screen was low at 1.1 (>2). Hematology consultation was obtained and additional workup ordered. His hemoglobin was trending in the 17 g range.  Factor VIII was 181% (). Comprehensive metabolic panel had no significant abnormality except for a total bilirubin of 1.4 (0.3-1.2). Homocysteine was 9 (0-13). Antiphospholipid antibodies were negative. Lupus anticoagulant was negative. Prothrombin gene mutation was negative. Factor V Leiden was positive for one copy of the R506Q mutation. Protein C activity was 100.1% () and protein S activity 81.2 (). Chest x-ray had no active cardiopulmonary disease. Antithrombin III was not ordered  pending patient coming off heparinoids. Patient was discharged on oral Eliquis. His platelet count was 155,000 on admission and 137,000 on discharge.   · 2/28/17 - WBC 5.3, hemoglobin 16.1, platelet count 178,000.   · 3/6/17 - [facility] hospitalization at [facility]. WBC 7.3, hemoglobin 15, platelet count 194,000. The patient claims to be having daily fevers and is scheduled to have IVC filter removed by Dr. Gamino, which Dr. Gamino thinks he may be allergic to. Counseled the patient regarding having his siblings and children tested for factor V Leiden. Went over risks with acquired factors in addition to genetic factors. Factor VIII activity 191% (), antithrombin III activity 86% ().     · 3/7/17 - Patient underwent retrieval of inferior vena cava filter by Hai Gamino M.D. as patient was having recurrent low-grade fever which was thought to be either from DVT or possibly the vena cava filter. Eliquis was held for one day.   · 3/16/17 - Lower extremity venous Dopplers with subacute left femoral and popliteal DVT without significant change from prior exam.   · 4/3/17 - WBC 6.3, hemoglobin 15.1, platelet count 193,000. Patient claims to have had dental work done while on Eliquis. Asked him to stop it for a day when requiring additional dental work.   · 7/10/17 - WBC 7.7, hemoglobin 16.7, platelet count 45,000. Patient was a difficult stick. Repeat ordered for next day. Comprehensive metabolic panel with no significant abnormality. Factor VIII activity 130% (). D-dimer 0.73 (<0.45).     · 8/15/17 - Eliquis 5 mg b.i.d. resumed. Prescribed Zantac 150 mg p.o. daily for GERD prophylaxis due to chronic Mobic daily.   · 9/22/17 - Patient is to continue with the Eliquis 5 mg p.o. b.i.d. Patient did have a couple of episodes of gum bleeds but denied any other bleeding and those episodes only lasted for a short period of time. Denied any signs or  symptoms of recurrent DVT.   · 10/5/17 - Factor VIII activity 133 ().    · 12/6/17 - Continue Eliquis 5 mg b.i.d. Instructed to hold dose any time his platelets are <50,000.   · 1/5/18 - Factor VIII activity 94% ().    · 9/10/18 - D-dimer 0.28 (<0.45).   · 9/14/18 - BLE Doppler showed chronic DVT along the left common femoral vein and femoral vein unchanged from prior.    · 11/22/18 - During ED visit for left flank pain CT abdomen and pelvis showed 4 mm obstructing stone in the left ureterovesical junction resulting in moderate left hydroureteronephrosis. Several non-obstructing left renal stones were present. Sigmoid diverticulosis was present. There were prominent vessels in the left inguinal region with diminutive size of the left common iliac and left external iliac veins suggesting chronic stricture/stenosis of these veins.     · 1/9/19 - Eliquis dose decreased to 2.5 mg p.o. b.i.d. as maintenance.     2.   ITP diagnosed July 2017.   · 7/10/17 - WBC 7.7, hemoglobin 16.7, platelet count 45,000. Folate 17.5. ARACELI screen negative. EBV IgM 2.7 (<0.9), EBV IgG >8 (<0.9). CMV IgM 0.66 (<0.91), CMV IgG 12.6 (<0.9). Vitamin B12 of 400 (211-911),  (). PT PTT 13.5 and 30.6. Platelet antibodies detected for HLA only.   · 7/17/17 - WBC 7.2, hemoglobin 16.5, platelet count 33,000. Patient asked to hold Eliquis and started on Danazol 200 mg p.o. t.i.d. after discussion of risks and benefits of the several treatments.   · 7/21/17 -   · 8/7/17 - WBC 6.3, hemoglobin 16.1, platelet count 11,000. Danazol discontinued and patient asked to restart Simvastatin as it was stopped because of interaction between the two drugs. Order written to transfuse 1 unit of platelets today and start IV IgG 1 g/kg daily x2 days STAT.   · 8/8/17 - Received 1 unit of platelets for a platelet count of 16,000. Ferritin 226. EBV IgM positive 2.6 (H). H-pylori IgM 0.18 (N), h-pylori antibody IgG 0.15 (N). IgG >8 (H).   · 8/10/17 -  WBC 9.5, hemoglobin 15.6, platelet count 29,000.   · 8/10/17  and 8/11/17 - 150 g IV IG given daily for a total dose of 2 g/kg.   · 8/11/17 - White count 7.8, hemoglobin 15.0, MCV 84.5 and platelet count 135,000.   · 8/15/17 - White count 4.83, hemoglobin 15.2, MCV 84.6 and platelet count 254,000. Hepatic function panel ordered.   · 9/5/17 - Order written for IV IgG 1 g/kg x1 dose for platelets <100,000 and also a weekly CBC.   · 9/8/17 - Patient was administered IV IgG 150 g. Platelet count 37,000.   · 9/12/17 - Platelet count 127,000. Patient received Eliquis.   · 9/22/17 - WBC 6.2, hemoglobin 16.0, platelet count 108,000. EBV IgM 1.4 (<0.9), EBV IgG >8 (<0.9).   · 10/6/17 - Patient received IV IgG 150 g with platelet count of 14,000 on 10/5/17.   · 10/24/17 - Platelet count 82,000. Nplate dose given 150 mcg subq at 1 mc/kg.   · 10/31/17 - Platelet count 102,000.   · 11/7/17 - Patient received Nplate 1 mcg/kg subq. Platelet count 69,000.   · 11/14/17 - Nplate 1 mcg/kg given for platelet count 52,000.   · 11/21/17 - Nplate 2 mcg/kg given for platelet count 48,000.   · 11/28/17 - Nplate held for platelet count 159,000.   · 12/5/17 - Nplate 2 mcg/kg given for a platelet count 57,000.    · 12/6/17 - IgA 214 (), IgG 1080 (600-1500), IgM 51 (). EBV IgM 1.5 (<0.9).       · 12/12/17 - Nplate 2 mcg/kg given for platelet count 129,000.   · 12/19/17 - Nplate held for platelet cont 207,000.   · 12/26/17 - Nplate held for platelet count 296,000.   · 1/3/18 - Nplate 2 mcg/kg given for platelet count 99,000.   · 1/5/18 - Discussed options of trying Promacta with the patient. He wants to stay with the Nplate at present with change in dosage to 2 mcg/kg subq every two weeks, looking at a pattern of his platelet count response.   · 1/24/18 - Nplate held for platelet count 169,000.   · 2/5/18 - Platelets 49,000. Patient recovering from flu and pneumonia on Prednisone and Levaquin. Orders written to continue Nplate 2  mcg/kg subq every two weeks. Hold Nplate for platelets >100.000. CBC to be checked one week post hold of any dosing with scheduling every two weeks from that point. Orders written to notify M.D./N.P. for Nplate dosing when platelets are <50,000. Orders written to hold Eliquis when platelets are <50,000.   · 2/26/18 - Platelet count 104,000. Nplate changed to 1 mcg/kg subq every two weeks.   · 4/4/18 - Chart reviewed from last visit of 3/5/18 forward with noted Nplate administered on 3/19/18 for platelet count of 124,000 and all other dosing held due to platelets above 150,000. Patient is concurrently on Prednisone 10 mg by mouth daily.   · 4/16/18 - Platelet count 85,000. Nplate 1 mcg/kg given.   · 5/2/18 - Platelet count 233,000. Nplate dose held. Order written to continue Nplate 1 mcg/kg subq every two weeks and to hold for platelets >150,000. In case dose held, order written to repeat CBC one week later. Discussed other available options of treatment with patient again. EBV DNA <200 (<200).   · 6/14/18 - Patient hospitalized at [facility] 6/14/18 through 6/20/18 by Dr. Tello with community-acquired pneumonia and atrial fibrillation. Chest x-ray 6/15/18 was negative for acute processes. CT chest without contrast, however, showed dense consolidation of left lower lobe with air bronchograms consistent with pneumonia. Calcified coronary artery disease was present. Enlarged left hilar lymph nodes were likely reactive. He underwent renal ultrasound which was unremarkable. He was started on IV antibiotics and ID was consulted. He developed acute-on-chronic renal failure with creatinine rising to 2.4 on 6/18/18. Nephrology was consulted and creatinine improved to 1.5 on 6/20/18. CBC 6/15/18 revealed WBC 15.0, hemoglobin 17.2 and platelets 158,000. Platelets remained normal throughout the hospitalization and CBC on 6/20/18 showed WBC 7.7, hemoglobin 14.1 and platelets 249,000. ARACELI was negative on 6/16/18. Blood cultures and  urine cultures were negative. Sputum culture showed mixed luzmaria. Tick panel was negative. Respiratory viral panel was negative. Echocardiogram showed LVEF 60% and a normal Doppler study. He developed AFib and required a Cardizem drip which was not effective. He subsequently received Amiodarone drip with conversion to normal sinus rhythm and prescribed Amiodarone on discharge. He was referred to Dr. Grier for sleep apnea workup as an outpatient.   · 7/2/18 - Reviewed CBC’s from 5/2/18 through 7/2/18 with Nplate dosed 150 mcg on 5/7/18 and 5/30/18 with lowest platelet count 143,000 on 5/30/18. Orders written to continue CBC every two weeks and orders written to decrease Nplate to 1 mg/kg subcutaneous injection monthly when platelets are <100,000.   · 9/10/18 - As patient’s last dose Nplate was in April 2018 and patient has had a platelet count >100,0000 since then, Nplate discontinued with a platelet count of 128,000.     3.   Iron deficiency diagnosed July 2017.   · 7/21/17 - Bone marrow aspiration with normocellular bone marrow (50%) with absent iron stores. Flow cytometry had no aberrant immunophenotypic finding. Cytogenetics revealed 47 XY normal male karyotype.    · 8/8/17 - Ferritin 226.   · 8/15/17 - Zantac 150 mg p.o. daily. Patient reports stool cards June 2017 at primary care physician’s negative for blood. Taking daily multivitamin.   · 9/22/17 - WBC 6.2, hemoglobin 16.0, hematocrit 46.8, MCV 83.0, platelet count 108,000.   · 10/5/17 - Serum protein electrophoresis with hypoalbuminemia.   · 12/6/17 - Iron 72 (), TIBC 360 (228-428), iron saturation 20 (20-50), ferritin 58 (), creatinine 1.3 (0.7-1.2).          4.   Polycythemia diagnosed January 2019.  G6PD deficiency and decreased hexyokinase level diagnosed April 2019.   · 1/9/19 - Hemoglobin 17.0, hematocrit 50.1. Patient has obstructive sleep apnea and has been on CPAP continuously since September 2018. O2 sat on room air 97%. Hemoglobin  electrophoresis with normal hemoglobin pattern. Erythropoietin 4.16 (2.59-18.5) and  (). Vitamin B12 of 422 (211-911).    · 4/17/2019- RBC enzymes revealed G6PD 7.7 (8.8-13.4), hexyokinase 0.7 (0.8-1.9).  Cobalt negative (less than 0.99).  · 7/29/2019- the results of RBC enzyme testing and discussed significance G6PD deficiency in detail.  The patient was made aware that this deficiency can cause polycythemia but also more commonly can cause hemolytic anemia.  Can be triggered by certain medications and meals are more often affected than females.  I have advised the patient that G6PD deficiencies are transferred 100% to daughters and 50% chance to sons.  Daughters are usually carriers and typically unaffected but I have advised the patient to notify his daughters.  I discussed the risk of developing hemolytic anemia. The patient was given a list of medications and chemicals/foods that should be avoided which may trigger onset of hemolysis as printed through the [employer] website.    Past Medical History:   Diagnosis Date   • CAD (coronary artery disease) 1997   • CKD (chronic kidney disease), stage III (CMS/[facility]) 06/2018    Dr. West   • Diverticulosis 10/2016   • DJD (degenerative joint disease)    • DVT, lower extremity, recurrent, left (CMS/[facility]) 02/2017   • Elevated factor VIII level 2018   • Hyperlipidemia 1997   • Hypertension 1997   • Pneumonia 2018    January 2018 and June 2018   • Renal calculi 09/2016   • Sleep apnea 2018    Dr. Grier     Past Surgical History:   Procedure Laterality Date   • CYSTOSCOPY URETEROSCOPY Left 12/10/2018    Dr. Ty   • HAND SURGERY Left     Ganglion cyst removed palm of left hand   • REPLACEMENT TOTAL KNEE Bilateral     one in 2001 and the other in 2002   • SEPTOPLASTY      deviated septum       Current Outpatient Medications:   •  apixaban (ELIQUIS) 2.5 MG tablet tablet, Take 2.5 mg by mouth Every 12 (Twelve) Hours., Disp: , Rfl:   •  diltiazem XR (DILACOR XR)  240 MG 24 hr capsule, 1 capsule Daily., Disp: , Rfl:   •  furosemide (LASIX) 40 MG tablet, FUROSEMIDE 40 MG TABS, Disp: , Rfl:   •  isosorbide mononitrate (IMDUR) 30 MG 24 hr tablet, Daily., Disp: , Rfl:   •  Magnesium Oxide -Mg Supplement 400 MG capsule, MAGNESIUM 400 MG CAPS, Disp: , Rfl:   •  metFORMIN (FORTAMET) 500 MG (OSM) 24 hr tablet, Daily., Disp: , Rfl:   •  potassium chloride (KLOR-CON) 20 MEQ packet, KLOR-CON 20 MEQ PACK, Disp: , Rfl:   •  pravastatin (PRAVACHOL) 80 MG tablet, Daily., Disp: , Rfl:   •  ranitidine (ZANTAC) 150 MG capsule, RANITIDINE  MG CAPS, Disp: , Rfl:     Allergies   Allergen Reactions   • Bee Venom Anaphylaxis   • Gemfibrozil Hives       Family History   Problem Relation Age of Onset   • Lymphoma Brother 42        Non Hodgkins lymphoma   • Stroke Brother    • Heart attack Other         Extensive MI History on Paternal side       Cancer-related family history includes Lymphoma (age of onset: 42) in his brother.    Social History     Tobacco Use   • Smoking status: Former Smoker     Packs/day: 1.50     Years: 2.00     Pack years: 3.00     Types: Cigarettes   • Tobacco comment: smoked one and a half packs of cigarettes a day for two years quitting at age 36   Substance Use Topics   • Alcohol use: Yes     Alcohol/week: 1.2 oz     Types: 2 Cans of beer per week     Comment: drinks two beers monthly   • Drug use: Not on file       I have reviewed the history of present illness, past medical history, family history, social history, lab results, all notes and other records since the patient was last seen on 7/10/2019.    SUBJECTIVE: Reported to having surgery scheduled with [facility] on his ring finger, right hand and requested clearance.           REVIEW OF SYSTEMS:  Review of Systems   Constitutional: Negative for activity change, appetite change, chills, fatigue and fever.   HENT: Negative for ear pain, mouth sores, nosebleeds, sinus pain and sore throat.    Eyes: Negative  "for photophobia and visual disturbance.   Respiratory: Negative for cough, shortness of breath, wheezing and stridor.    Cardiovascular: Negative for chest pain and palpitations.   Gastrointestinal: Negative for abdominal pain, diarrhea, nausea and vomiting.   Endocrine: Negative for cold intolerance and heat intolerance.   Genitourinary: Negative for difficulty urinating, dysuria, frequency and hematuria.   Musculoskeletal: Negative for joint swelling and neck stiffness.   Skin: Negative for color change and rash.   Neurological: Negative for seizures and syncope.   Hematological: Negative for adenopathy. Does not bruise/bleed easily.        No obvious bleeding   Psychiatric/Behavioral: Negative for agitation, confusion and hallucinations.   All other systems reviewed and are negative.      OBJECTIVE:    Vitals:    07/29/19 0822   BP: 144/78   Pulse: 65   Resp: 18   Temp: 98.2 °F (36.8 °C)   Weight: (!) 149 kg (329 lb)   Height: 180.3 cm (71\")   PainSc: 0-No pain       ECOG  (1) Restricted in physically strenuous activity, ambulatory and able to do work of light nature    Physical Exam   Constitutional: He is oriented to person, place, and time. He appears well-developed and well-nourished. No distress.   HENT:   Head: Normocephalic and atraumatic.       Nose: Nose normal.   Mouth/Throat: Oropharynx is clear and moist. No oropharyngeal exudate.   Eyes: Conjunctivae and EOM are normal. Pupils are equal, round, and reactive to light. Right eye exhibits no discharge. Left eye exhibits no discharge. No scleral icterus.   Neck: Normal range of motion. Neck supple. No thyromegaly present.   Cardiovascular: Normal rate, regular rhythm, normal heart sounds and intact distal pulses. Exam reveals no gallop and no friction rub.   No murmur heard.  Pulmonary/Chest: Effort normal and breath sounds normal. No stridor. No respiratory distress. He has no wheezes. He has no rales.   Abdominal: Soft. Bowel sounds are normal. He " exhibits no mass. There is no tenderness. There is no rebound and no guarding.   Musculoskeletal: Normal range of motion. He exhibits no tenderness.   Lymphadenopathy:     He has no cervical adenopathy.   Neurological: He is alert and oriented to person, place, and time. He exhibits normal muscle tone. Coordination normal.   Skin: Skin is warm and dry. No rash noted. He is not diaphoretic. No erythema. No pallor.   Psychiatric: He has a normal mood and affect. His behavior is normal.   Nursing note and vitals reviewed.    RECENT LABS  WBC   Date Value Ref Range Status   07/29/2019 8.42 3.40 - 10.80 10*3/mm3 Final     RBC   Date Value Ref Range Status   07/29/2019 5.79 4.14 - 5.80 10*6/mm3 Final     Hemoglobin   Date Value Ref Range Status   07/29/2019 16.2 13.0 - 17.7 g/dL Final     Hematocrit   Date Value Ref Range Status   07/29/2019 48.3 37.5 - 51.0 % Final     MCV   Date Value Ref Range Status   07/29/2019 83.4 79.0 - 97.0 fL Final     MCH   Date Value Ref Range Status   07/29/2019 28.0 26.6 - 33.0 pg Final     MCHC   Date Value Ref Range Status   07/29/2019 33.5 31.5 - 35.7 g/dL Final     RDW   Date Value Ref Range Status   07/29/2019 13.5 12.3 - 15.4 % Final     RDW-SD   Date Value Ref Range Status   07/29/2019 41.6 37.0 - 54.0 fl Final     MPV   Date Value Ref Range Status   07/29/2019 10.9 6.0 - 12.0 fL Final     Platelets   Date Value Ref Range Status   07/29/2019 125 (L) 140 - 450 10*3/mm3 Final     Neutrophil %   Date Value Ref Range Status   07/29/2019 43.7 42.7 - 76.0 % Final     Lymphocyte %   Date Value Ref Range Status   07/29/2019 41.3 19.6 - 45.3 % Final     Monocyte %   Date Value Ref Range Status   07/29/2019 11.5 5.0 - 12.0 % Final     Eosinophil %   Date Value Ref Range Status   07/29/2019 3.3 0.3 - 6.2 % Final     Basophil %   Date Value Ref Range Status   07/29/2019 0.2 0.0 - 1.5 % Final     Neutrophils, Absolute   Date Value Ref Range Status   07/29/2019 3.67 1.70 - 7.00 10*3/mm3 Final      Lymphocytes, Absolute   Date Value Ref Range Status   07/29/2019 3.48 (H) 0.70 - 3.10 10*3/mm3 Final     Monocytes, Absolute   Date Value Ref Range Status   07/29/2019 0.97 (H) 0.10 - 0.90 10*3/mm3 Final     Eosinophils, Absolute   Date Value Ref Range Status   07/29/2019 0.28 0.00 - 0.40 10*3/mm3 Final     Basophils, Absolute   Date Value Ref Range Status   07/29/2019 0.02 0.00 - 0.20 10*3/mm3 Final     nRBC   Date Value Ref Range Status   12/06/2018 0 0 /100[WBCs] Final       Lab Results   Component Value Date    GLUCOSE 128 (H) 03/19/2019    BUN 13 03/19/2019    CREATININE 1.3 (H) 03/19/2019    BCR 10.0 03/19/2019    K 4.3 03/19/2019    CO2 27 03/19/2019    CALCIUM 9.0 03/19/2019    ALBUMIN 3.9 03/19/2019    LABIL2 1.4 03/19/2019    AST 21 03/19/2019    ALT 26 03/19/2019       ASSESSMENT:    Chronic ITP (idiopathic thrombocytopenic purpura) (CMS/HCC)  - CBC & Differential  - CBC & Differential    Recurrent deep vein thrombosis (DVT) of left lower extremity (CMS/HCC)    Heterozygous factor V Leiden mutation (CMS/HCC)    Polycythemia  - CBC & Differential  - CBC & Differential    G6PD deficiency (CMS/HCC)    Low red blood cell hexokinase activity    Long term (current) use of anticoagulants    Essential hypertension, malignant  - Potassium  - Potassium    Stage 3 chronic kidney disease (CMS/HCC)    Chart reviewed.  The patient's platelets have remained above 100,000 and he has not experienced any bleeding events.  His hemoglobin is stable and the 16s.  For his chronic and recurrent LLE DVTs he is continued on Eliquis 2.5 mg by mouth twice daily but he does have an upcoming surgery.  His Eliquis is eliminated quicker from the body than some traditional anticoagulation medications the plan will be for him to hold Eliquis 2 days prior to his upcoming surgery to the right hand and resume the day post surgery or when okay by his surgeon.  He presented with an order for potassium level today this will be performed  and forwarded to [facility].  I discussed in detail the results of the RBC enzyme testing revealing G6PD deficiency and the decreased level of hexokinase.  Detailed discussion regarding the genetics of G6PD deficiency, the risk of hemolytic anemia, medications and foods to avoid, and I have advised him to notify his daughters as it is known for a male to transfer this deficiency 100% to his female daughters.  He has no sons.  I have advised patient should he develop acute anemia in the future then hemolysis needs to be ruled out.      PLAN:     1. Patient given list of medications and foods to avoid due to G6PD deficiency.  He is to notify his children of this abnormality in him.  2. Continue to monitor CBC including platelets and hemoglobin levels.  3. Continue Eliquis 2.5 mg by mouth twice a day.  Hold Eliquis 2 days prior to upcoming surgery and resume the day post surgery or when okayed by his surgeon.   4. Potassium level today with results forwarded to his surgeon (Dr. Ortiz).        I have reviewed labs results, imaging, vitals, and medications with the patient today. Will follow up in 3 months with the physician where CBC will be obtained.     Patient verbalized understanding and is in agreement of the above plan.    Much of the above report is an electronic transcription//translation of the spoken language to printed text using Dragon Software. As such, the subtleties and finesse of the spoken language may permit erroneous, or at times, nonsensical words or phrases to be inadvertently transcribed; thus changes may be made at a later date to rectify these errors.

## 2019-07-26 PROBLEM — Z79.01 LONG TERM (CURRENT) USE OF ANTICOAGULANTS: Status: ACTIVE | Noted: 2019-07-26

## 2019-07-29 ENCOUNTER — TELEPHONE (OUTPATIENT)
Dept: ONCOLOGY | Facility: CLINIC | Age: 64
End: 2019-07-29

## 2019-07-29 ENCOUNTER — OFFICE VISIT (OUTPATIENT)
Dept: ONCOLOGY | Facility: CLINIC | Age: 64
End: 2019-07-29

## 2019-07-29 ENCOUNTER — APPOINTMENT (OUTPATIENT)
Dept: LAB | Facility: HOSPITAL | Age: 64
End: 2019-07-29

## 2019-07-29 VITALS
HEART RATE: 65 BPM | RESPIRATION RATE: 18 BRPM | HEIGHT: 71 IN | WEIGHT: 315 LBS | TEMPERATURE: 98.2 F | BODY MASS INDEX: 44.1 KG/M2 | DIASTOLIC BLOOD PRESSURE: 78 MMHG | SYSTOLIC BLOOD PRESSURE: 144 MMHG

## 2019-07-29 DIAGNOSIS — R71.8: ICD-10-CM

## 2019-07-29 DIAGNOSIS — G47.33 OSA (OBSTRUCTIVE SLEEP APNEA): ICD-10-CM

## 2019-07-29 DIAGNOSIS — I10 ESSENTIAL HYPERTENSION, MALIGNANT: ICD-10-CM

## 2019-07-29 DIAGNOSIS — D75.1 POLYCYTHEMIA: ICD-10-CM

## 2019-07-29 DIAGNOSIS — D69.3 CHRONIC ITP (IDIOPATHIC THROMBOCYTOPENIC PURPURA) (HCC): Primary | ICD-10-CM

## 2019-07-29 DIAGNOSIS — D68.51 HETEROZYGOUS FACTOR V LEIDEN MUTATION (HCC): ICD-10-CM

## 2019-07-29 DIAGNOSIS — D75.A G6PD DEFICIENCY: ICD-10-CM

## 2019-07-29 DIAGNOSIS — I82.402 RECURRENT DEEP VEIN THROMBOSIS (DVT) OF LEFT LOWER EXTREMITY (HCC): ICD-10-CM

## 2019-07-29 DIAGNOSIS — N18.30 STAGE 3 CHRONIC KIDNEY DISEASE (HCC): ICD-10-CM

## 2019-07-29 DIAGNOSIS — Z79.01 LONG TERM (CURRENT) USE OF ANTICOAGULANTS: ICD-10-CM

## 2019-07-29 LAB
BASOPHILS # BLD AUTO: 0.02 10*3/MM3 (ref 0–0.2)
BASOPHILS NFR BLD AUTO: 0.2 % (ref 0–1.5)
DEPRECATED RDW RBC AUTO: 41.6 FL (ref 37–54)
EOSINOPHIL # BLD AUTO: 0.28 10*3/MM3 (ref 0–0.4)
EOSINOPHIL NFR BLD AUTO: 3.3 % (ref 0.3–6.2)
ERYTHROCYTE [DISTWIDTH] IN BLOOD BY AUTOMATED COUNT: 13.5 % (ref 12.3–15.4)
HCT VFR BLD AUTO: 48.3 % (ref 37.5–51)
HGB BLD-MCNC: 16.2 G/DL (ref 13–17.7)
LYMPHOCYTES # BLD AUTO: 3.48 10*3/MM3 (ref 0.7–3.1)
LYMPHOCYTES NFR BLD AUTO: 41.3 % (ref 19.6–45.3)
MCH RBC QN AUTO: 28 PG (ref 26.6–33)
MCHC RBC AUTO-ENTMCNC: 33.5 G/DL (ref 31.5–35.7)
MCV RBC AUTO: 83.4 FL (ref 79–97)
MONOCYTES # BLD AUTO: 0.97 10*3/MM3 (ref 0.1–0.9)
MONOCYTES NFR BLD AUTO: 11.5 % (ref 5–12)
NEUTROPHILS # BLD AUTO: 3.67 10*3/MM3 (ref 1.7–7)
NEUTROPHILS NFR BLD AUTO: 43.7 % (ref 42.7–76)
PLATELET # BLD AUTO: 125 10*3/MM3 (ref 140–450)
PMV BLD AUTO: 10.9 FL (ref 6–12)
POTASSIUM BLD-SCNC: 4.1 MMOL/L (ref 3.6–5.1)
RBC # BLD AUTO: 5.79 10*6/MM3 (ref 4.14–5.8)
WBC NRBC COR # BLD: 8.42 10*3/MM3 (ref 3.4–10.8)

## 2019-07-29 PROCEDURE — 85025 COMPLETE CBC W/AUTO DIFF WBC: CPT | Performed by: NURSE PRACTITIONER

## 2019-07-29 PROCEDURE — 84132 ASSAY OF SERUM POTASSIUM: CPT | Performed by: NURSE PRACTITIONER

## 2019-07-29 PROCEDURE — 99214 OFFICE O/P EST MOD 30 MIN: CPT | Performed by: NURSE PRACTITIONER

## 2019-07-29 PROCEDURE — 36415 COLL VENOUS BLD VENIPUNCTURE: CPT | Performed by: NURSE PRACTITIONER

## 2019-07-29 NOTE — TELEPHONE ENCOUNTER
The patient was seen today by Arian DANG. Potassium was checked per the patient request for Dr. Ortiz and the patient requested to have the result called to himself as well.  Called the patient and informed him of the result, faxed result to the M.D. Per request for upcoming surgery.

## 2019-07-29 NOTE — PATIENT INSTRUCTIONS
Continue Eliquis 2.5 mg by mouth twice a day.    Stop the Eliquis 2 days before surgery and resume the day after surgery or when cleared by surgeon to resume..

## 2019-08-23 ENCOUNTER — LAB (OUTPATIENT)
Dept: LAB | Facility: HOSPITAL | Age: 64
End: 2019-08-23

## 2019-08-23 ENCOUNTER — TRANSCRIBE ORDERS (OUTPATIENT)
Dept: ADMINISTRATIVE | Facility: HOSPITAL | Age: 64
End: 2019-08-23

## 2019-08-23 DIAGNOSIS — G47.33 OBSTRUCTIVE SLEEP APNEA (ADULT) (PEDIATRIC): ICD-10-CM

## 2019-08-23 DIAGNOSIS — Z53.21 PATIENT LEFT WITHOUT BEING SEEN: ICD-10-CM

## 2019-08-23 DIAGNOSIS — K21.9 CHALASIA OF LOWER ESOPHAGEAL SPHINCTER: ICD-10-CM

## 2019-08-23 DIAGNOSIS — R79.89 HYPOURICEMIA: ICD-10-CM

## 2019-08-23 DIAGNOSIS — I82.402 RECURRENT DEEP VEIN THROMBOSIS (DVT) OF LEFT LOWER EXTREMITY (HCC): Primary | ICD-10-CM

## 2019-08-23 DIAGNOSIS — E11.9 DIABETES MELLITUS WITHOUT COMPLICATION (HCC): ICD-10-CM

## 2019-08-23 DIAGNOSIS — Z00.00 ROUTINE GENERAL MEDICAL EXAMINATION AT A HEALTH CARE FACILITY: ICD-10-CM

## 2019-08-23 DIAGNOSIS — I10 HYPERTENSION, UNSPECIFIED TYPE: ICD-10-CM

## 2019-08-23 DIAGNOSIS — I48.91 ATRIAL FIBRILLATION, UNSPECIFIED TYPE (HCC): Primary | ICD-10-CM

## 2019-08-23 DIAGNOSIS — I48.91 ATRIAL FIBRILLATION, UNSPECIFIED TYPE (HCC): ICD-10-CM

## 2019-08-23 DIAGNOSIS — E66.9 OBESITY, UNSPECIFIED CLASSIFICATION, UNSPECIFIED OBESITY TYPE, UNSPECIFIED WHETHER SERIOUS COMORBIDITY PRESENT: ICD-10-CM

## 2019-08-23 LAB
ALBUMIN SERPL-MCNC: 3.8 G/DL (ref 3.5–4.8)
ALBUMIN/GLOB SERPL: 1.7 G/DL (ref 1–1.7)
ALP SERPL-CCNC: 68 U/L (ref 32–91)
ALT SERPL W P-5'-P-CCNC: 28 U/L (ref 17–63)
ANION GAP SERPL CALCULATED.3IONS-SCNC: 15.4 MMOL/L (ref 5–15)
ARTICHOKE IGE QN: 91 MG/DL (ref 0–100)
AST SERPL-CCNC: 22 U/L (ref 15–41)
BILIRUB SERPL-MCNC: 1.1 MG/DL (ref 0.3–1.2)
BUN BLD-MCNC: 14 MG/DL (ref 8–20)
BUN/CREAT SERPL: 10 (ref 6.2–20.3)
CALCIUM SPEC-SCNC: 8.8 MG/DL (ref 8.9–10.3)
CHLORIDE SERPL-SCNC: 103 MMOL/L (ref 101–111)
CHOLEST SERPL-MCNC: 134 MG/DL
CO2 SERPL-SCNC: 25 MMOL/L (ref 22–32)
CREAT BLD-MCNC: 1.4 MG/DL (ref 0.7–1.2)
DEPRECATED RDW RBC AUTO: 41.1 FL (ref 37–54)
ERYTHROCYTE [DISTWIDTH] IN BLOOD BY AUTOMATED COUNT: 13.8 % (ref 12.3–15.4)
GFR SERPL CREATININE-BSD FRML MDRD: 51 ML/MIN/1.73
GLOBULIN UR ELPH-MCNC: 2.3 GM/DL (ref 2.5–3.8)
GLUCOSE BLD-MCNC: 128 MG/DL (ref 65–99)
HBA1C MFR BLD: 6 % (ref 3.5–5.6)
HCT VFR BLD AUTO: 46.8 % (ref 37.5–51)
HDLC SERPL QL: 3.35
HDLC SERPL-MCNC: 40 MG/DL
HGB BLD-MCNC: 16 G/DL (ref 13–17.7)
LDLC/HDLC SERPL: 1.94 {RATIO}
MAGNESIUM SERPL-MCNC: 2.1 MG/DL (ref 1.8–2.5)
MCH RBC QN AUTO: 29.1 PG (ref 26.6–33)
MCHC RBC AUTO-ENTMCNC: 34.3 G/DL (ref 31.5–35.7)
MCV RBC AUTO: 84.9 FL (ref 79–97)
PLATELET # BLD AUTO: 128 10*3/MM3 (ref 140–450)
PMV BLD AUTO: 9.4 FL (ref 6–12)
POTASSIUM BLD-SCNC: 4.4 MMOL/L (ref 3.6–5.1)
PROT SERPL-MCNC: 6.1 G/DL (ref 6.1–7.9)
PSA SERPL-MCNC: 2.92 NG/ML (ref 0–4)
RBC # BLD AUTO: 5.51 10*6/MM3 (ref 4.14–5.8)
SODIUM BLD-SCNC: 139 MMOL/L (ref 136–144)
TRIGL SERPL-MCNC: 82 MG/DL
TSH SERPL DL<=0.05 MIU/L-ACNC: 1.56 MIU/ML (ref 0.34–5.6)
VLDLC SERPL-MCNC: 16.4 MG/DL
WBC NRBC COR # BLD: 6.3 10*3/MM3 (ref 3.4–10.8)

## 2019-08-23 PROCEDURE — 36415 COLL VENOUS BLD VENIPUNCTURE: CPT

## 2019-08-23 PROCEDURE — 85027 COMPLETE CBC AUTOMATED: CPT

## 2019-08-23 PROCEDURE — G0103 PSA SCREENING: HCPCS

## 2019-08-23 PROCEDURE — 83036 HEMOGLOBIN GLYCOSYLATED A1C: CPT

## 2019-08-23 PROCEDURE — 80061 LIPID PANEL: CPT

## 2019-08-23 PROCEDURE — 80053 COMPREHEN METABOLIC PANEL: CPT

## 2019-08-23 PROCEDURE — 83735 ASSAY OF MAGNESIUM: CPT

## 2019-08-23 PROCEDURE — 84443 ASSAY THYROID STIM HORMONE: CPT

## 2019-09-25 ENCOUNTER — TELEPHONE (OUTPATIENT)
Dept: ONCOLOGY | Facility: CLINIC | Age: 64
End: 2019-09-25

## 2019-09-25 NOTE — TELEPHONE ENCOUNTER
Mr. Torres left message asking when his next appt was scheduled for.  Returned call, relayed information w/ appt date (10/31) and time (noon).

## 2019-10-18 ENCOUNTER — OFFICE (OUTPATIENT)
Dept: URBAN - METROPOLITAN AREA CLINIC 64 | Facility: CLINIC | Age: 64
End: 2019-10-18
Payer: COMMERCIAL

## 2019-10-18 VITALS
WEIGHT: 312 LBS | SYSTOLIC BLOOD PRESSURE: 157 MMHG | HEIGHT: 71 IN | DIASTOLIC BLOOD PRESSURE: 90 MMHG | HEART RATE: 56 BPM

## 2019-10-18 DIAGNOSIS — R19.5 OTHER FECAL ABNORMALITIES: ICD-10-CM

## 2019-10-18 DIAGNOSIS — Z86.010 PERSONAL HISTORY OF COLONIC POLYPS: ICD-10-CM

## 2019-10-18 DIAGNOSIS — D68.2 HEREDITARY DEFICIENCY OF OTHER CLOTTING FACTORS: ICD-10-CM

## 2019-10-18 PROCEDURE — 99203 OFFICE O/P NEW LOW 30 MIN: CPT | Performed by: INTERNAL MEDICINE

## 2019-10-29 NOTE — PROGRESS NOTES
Hematology/Oncology Outpatient Follow Up    PATIENT NAME:Seth Torres  :1955  MRN: [id number]  PRIMARY CARE PHYSICIAN: Myra Tello MD  REFERRING PHYSICIAN: Myra Tello MD    Chief Complaint   Patient presents with   • Follow-up     for recurrent left lower extremity deep venous thrombosis, ITP, JACIEL and polycythemia      HISTORY OF PRESENT ILLNESS:   1. Recurrent left lower extremity deep venous thrombosis diagnosed 2017.   · 17 - Patient was hospitalized at [facility] between 17 and 17 with spontaneous left lower extremity edema upon waking up. He gave a history of deep venous thrombosis of the left lower extremity in  after ACL repair, treated at that time with one year of Warfarin followed by newer anticoagulants near the end of that year, although I am unsure if newer oral anticoagulants were available at that time. Doppler was performed the day of admission and was positive for extensive DVT as well as superficial thrombosis of the left lower extremity. He was started on Heparin drip and Vascular Surgery was consulted. IVC filter and thrombectomy was performed on 17 with acute thrombus in the left popliteal and the femoral veins with more chronic thrombus in the iliac vein noted intraoperatively. Heparin was changed to Lovenox and thrombophilia workup was ordered by Hai Gamino M.D. Factor V Leiden screen was low at 1.1 (>2). Hematology consultation was obtained and additional workup ordered. His hemoglobin was trending in the 17 g range.  Factor VIII was 181% (). Comprehensive metabolic panel had no significant abnormality except for a total bilirubin of 1.4 (0.3-1.2). Homocysteine was 9 (0-13). Antiphospholipid antibodies were negative. Lupus anticoagulant was negative. Prothrombin gene mutation was negative. Factor V Leiden was positive for one copy of the R506Q mutation. Protein C activity was 100.1% () and protein S activity 81.2 ().  Chest x-ray had no active cardiopulmonary disease. Antithrombin III was not ordered pending patient coming off heparinoids. Patient was discharged on oral Eliquis. His platelet count was 155,000 on admission and 137,000 on discharge.   · 2/28/17 - WBC 5.3, hemoglobin 16.1, platelet count 178,000.   · 3/6/17 - [facility] at [facility]. WBC 7.3, hemoglobin 15, platelet count 194,000. The patient claims to be having daily fevers and is scheduled to have IVC filter removed by Dr. Gamino, which Dr. Gamino thinks he may be allergic to. Counseled the patient regarding having his siblings and children tested for factor V Leiden. Went over risks with acquired factors in addition to genetic factors. Factor VIII activity 191% (), antithrombin III activity 86% ().     · 3/7/17 - Patient underwent retrieval of inferior vena cava filter by Hai Gamino M.D. as patient was having recurrent low-grade fever which was thought to be either from DVT or possibly the vena cava filter. Eliquis was held for one day.   · 3/16/17 - Lower extremity venous Dopplers with subacute left femoral and popliteal DVT without significant change from prior exam.   · 4/3/17 - WBC 6.3, hemoglobin 15.1, platelet count 193,000. Patient claims to have had dental work done while on Eliquis. Asked him to stop it for a day when requiring additional dental work.   · 7/10/17 - WBC 7.7, hemoglobin 16.7, platelet count 45,000. Patient was a difficult stick. Repeat ordered for next day. Comprehensive metabolic panel with no significant abnormality. Factor VIII activity 130% (). D-dimer 0.73 (<0.45).     · 8/15/17 - Eliquis 5 mg b.i.d. resumed. Prescribed Zantac 150 mg p.o. daily for GERD prophylaxis due to chronic Mobic daily.   · 9/22/17 - Patient is to continue with the Eliquis 5 mg p.o. b.i.d. Patient did have a couple of episodes of gum bleeds but denied any other bleeding  and those episodes only lasted for a short period of time. Denied any signs or symptoms of recurrent DVT.   · 10/5/17 - Factor VIII activity 133 ().    · 12/6/17 - Continue Eliquis 5 mg b.i.d. Instructed to hold dose any time his platelets are <50,000.   · 1/5/18 - Factor VIII activity 94% ().    · 9/10/18 - D-dimer 0.28 (<0.45).   · 9/14/18 - BLE Doppler showed chronic DVT along the left common femoral vein and femoral vein unchanged from prior.    · 11/22/18 - During ED visit for left flank pain CT abdomen and pelvis showed 4 mm obstructing stone in the left ureterovesical junction resulting in moderate left hydroureteronephrosis. Several non-obstructing left renal stones were present. Sigmoid diverticulosis was present. There were prominent vessels in the left inguinal region with diminutive size of the left common iliac and left external iliac veins suggesting chronic stricture/stenosis of these veins.     · 1/9/19 - Eliquis dose decreased to 2.5 mg p.o. b.i.d. as maintenance.     2.   ITP diagnosed July 2017.   · 7/10/17 - WBC 7.7, hemoglobin 16.7, platelet count 45,000. Folate 17.5. ARACELI screen negative. EBV IgM 2.7 (<0.9), EBV IgG >8 (<0.9). CMV IgM 0.66 (<0.91), CMV IgG 12.6 (<0.9). Vitamin B12 of 400 (211-911),  (). PT PTT 13.5 and 30.6. Platelet antibodies detected for HLA only.   · 7/17/17 - WBC 7.2, hemoglobin 16.5, platelet count 33,000. Patient asked to hold Eliquis and started on Danazol 200 mg p.o. t.i.d. after discussion of risks and benefits of the several treatments.   · 7/21/17 -   · 8/7/17 - WBC 6.3, hemoglobin 16.1, platelet count 11,000. Danazol discontinued and patient asked to restart Simvastatin as it was stopped because of interaction between the two drugs. Order written to transfuse 1 unit of platelets today and start IV IgG 1 g/kg daily x2 days STAT.   · 8/8/17 - Received 1 unit of platelets for a platelet count of 16,000. Ferritin 226. EBV IgM positive 2.6 (H).  H-pylori IgM 0.18 (N), h-pylori antibody IgG 0.15 (N). IgG >8 (H).   · 8/10/17 - WBC 9.5, hemoglobin 15.6, platelet count 29,000.   · 8/10/17  and 8/11/17 - 150 g IV IG given daily for a total dose of 2 g/kg.   · 8/11/17 - White count 7.8, hemoglobin 15.0, MCV 84.5 and platelet count 135,000.   · 8/15/17 - White count 4.83, hemoglobin 15.2, MCV 84.6 and platelet count 254,000. Hepatic function panel ordered.   · 9/5/17 - Order written for IV IgG 1 g/kg x1 dose for platelets <100,000 and also a weekly CBC.   · 9/8/17 - Patient was administered IV IgG 150 g. Platelet count 37,000.   · 9/12/17 - Platelet count 127,000. Patient received Eliquis.   · 9/22/17 - WBC 6.2, hemoglobin 16.0, platelet count 108,000. EBV IgM 1.4 (<0.9), EBV IgG >8 (<0.9).   · 10/6/17 - Patient received IV IgG 150 g with platelet count of 14,000 on 10/5/17.   · 10/24/17 - Platelet count 82,000. Nplate dose given 150 mcg subq at 1 mc/kg.   · 10/31/17 - Platelet count 102,000.   · 11/7/17 - Patient received Nplate 1 mcg/kg subq. Platelet count 69,000.   · 11/14/17 - Nplate 1 mcg/kg given for platelet count 52,000.   · 11/21/17 - Nplate 2 mcg/kg given for platelet count 48,000.   · 11/28/17 - Nplate held for platelet count 159,000.   · 12/5/17 - Nplate 2 mcg/kg given for a platelet count 57,000.    · 12/6/17 - IgA 214 (), IgG 1080 (600-1500), IgM 51 (). EBV IgM 1.5 (<0.9).       · 12/12/17 - Nplate 2 mcg/kg given for platelet count 129,000.   · 12/19/17 - Nplate held for platelet cont 207,000.   · 12/26/17 - Nplate held for platelet count 296,000.   · 1/3/18 - Nplate 2 mcg/kg given for platelet count 99,000.   · 1/5/18 - Discussed options of trying Promacta with the patient. He wants to stay with the Nplate at present with change in dosage to 2 mcg/kg subq every two weeks, looking at a pattern of his platelet count response.   · 1/24/18 - Nplate held for platelet count 169,000.   · 2/5/18 - Platelets 49,000. Patient recovering from flu  and pneumonia on Prednisone and Levaquin. Orders written to continue Nplate 2 mcg/kg subq every two weeks. Hold Nplate for platelets >100.000. CBC to be checked one week post hold of any dosing with scheduling every two weeks from that point. Orders written to notify M.D./N.P. for Nplate dosing when platelets are <50,000. Orders written to hold Eliquis when platelets are <50,000.   · 2/26/18 - Platelet count 104,000. Nplate changed to 1 mcg/kg subq every two weeks.   · 4/4/18 - Chart reviewed from last visit of 3/5/18 forward with noted Nplate administered on 3/19/18 for platelet count of 124,000 and all other dosing held due to platelets above 150,000. Patient is concurrently on Prednisone 10 mg by mouth daily.   · 4/16/18 - Platelet count 85,000. Nplate 1 mcg/kg given.   · 5/2/18 - Platelet count 233,000. Nplate dose held. Order written to continue Nplate 1 mcg/kg subq every two weeks and to hold for platelets >150,000. In case dose held, order written to repeat CBC one week later. Discussed other available options of treatment with patient again. EBV DNA <200 (<200).   · 6/14/18 - Patient hospitalized at [facility] 6/14/18 through 6/20/18 by Dr. Tello with community-acquired pneumonia and atrial fibrillation. Chest x-ray 6/15/18 was negative for acute processes. CT chest without contrast, however, showed dense consolidation of left lower lobe with air bronchograms consistent with pneumonia. Calcified coronary artery disease was present. Enlarged left hilar lymph nodes were likely reactive. He underwent renal ultrasound which was unremarkable. He was started on IV antibiotics and ID was consulted. He developed acute-on-chronic renal failure with creatinine rising to 2.4 on 6/18/18. Nephrology was consulted and creatinine improved to 1.5 on 6/20/18. CBC 6/15/18 revealed WBC 15.0, hemoglobin 17.2 and platelets 158,000. Platelets remained normal throughout the hospitalization and CBC on 6/20/18 showed WBC 7.7, hemoglobin  14.1 and platelets 249,000. ARACELI was negative on 6/16/18. Blood cultures and urine cultures were negative. Sputum culture showed mixed luzmaria. Tick panel was negative. Respiratory viral panel was negative. Echocardiogram showed LVEF 60% and a normal Doppler study. He developed AFib and required a Cardizem drip which was not effective. He subsequently received Amiodarone drip with conversion to normal sinus rhythm and prescribed Amiodarone on discharge. He was referred to Dr. Grier for sleep apnea workup as an outpatient.   · 7/2/18 - Reviewed CBC’s from 5/2/18 through 7/2/18 with Nplate dosed 150 mcg on 5/7/18 and 5/30/18 with lowest platelet count 143,000 on 5/30/18. Orders written to continue CBC every two weeks and orders written to decrease Nplate to 1 mg/kg subcutaneous injection monthly when platelets are <100,000.   · 9/10/18 - As patient’s last dose Nplate was in April 2018 and patient has had a platelet count >100,0000 since then, Nplate discontinued with a platelet count of 128,000.     3.   Iron deficiency diagnosed July 2017.   · 7/21/17 - Bone marrow aspiration with normocellular bone marrow (50%) with absent iron stores. Flow cytometry had no aberrant immunophenotypic finding. Cytogenetics revealed 47 XY normal male karyotype.    · 8/8/17 - Ferritin 226.   · 8/15/17 - Zantac 150 mg p.o. daily. Patient reports stool cards June 2017 at primary care physician’s negative for blood. Taking daily multivitamin.   · 9/22/17 - WBC 6.2, hemoglobin 16.0, hematocrit 46.8, MCV 83.0, platelet count 108,000.   · 10/5/17 - Serum protein electrophoresis with hypoalbuminemia.   · 12/6/17 - Iron 72 (), TIBC 360 (228-428), iron saturation 20 (20-50), ferritin 58 (), creatinine 1.3 (0.7-1.2).          4.   Polycythemia diagnosed January 2019.  G6PD deficiency and decreased hexyokinase level diagnosed April 2019.   · 1/9/19 - Hemoglobin 17.0, hematocrit 50.1. Patient has obstructive sleep apnea and has been on CPAP  continuously since September 2018. O2 sat on room air 97%. Hemoglobin electrophoresis with normal hemoglobin pattern. Erythropoietin 4.16 (2.59-18.5) and  (). Vitamin B12 of 422 (211-911).    · 4/17/2019- RBC enzymes revealed G6PD 7.7 (8.8-13.4), hexyokinase 0.7 (0.8-1.9).  Cobalt negative (less than 0.99).  · 7/29/2019- discussed the results of RBC enzyme testing and discussed significance of G6PD deficiency in detail.  The patient was made aware that this deficiency can cause polycythemia but also more commonly can cause hemolytic anemia.  Can be triggered by certain medications and meals are more often affected than females.  I have advised the patient that G6PD deficiencies are transferred 100% to daughters and 50% chance to sons.  Daughters are usually carriers and typically unaffected but I have advised the patient to notify his daughters.  I discussed the risk of developing hemolytic anemia. The patient was given a list of medications and chemicals/foods that should be avoided which may trigger onset of hemolysis as printed through the [employer] website.    Past Medical History:   Diagnosis Date   • CAD (coronary artery disease) 1997   • CKD (chronic kidney disease), stage III (CMS/[facility]) 06/2018    Dr. West   • Diverticulosis 10/2016   • DJD (degenerative joint disease)    • DVT, lower extremity, recurrent, left (CMS/[facility]) 02/2017   • Elevated factor VIII level 2018   • Hyperlipidemia 1997   • Hypertension 1997   • Pneumonia 2018    January 2018 and June 2018   • Renal calculi 09/2016   • Sleep apnea 2018    Dr. Grier     Past Surgical History:   Procedure Laterality Date   • CYSTOSCOPY URETEROSCOPY Left 12/10/2018    Dr. Ty   • HAND SURGERY Left     Ganglion cyst removed palm of left hand   • REPLACEMENT TOTAL KNEE Bilateral     one in 2001 and the other in 2002   • SEPTOPLASTY      deviated septum       Current Outpatient Medications:   •  apixaban (ELIQUIS) 2.5 MG tablet tablet, Take 2.5  mg by mouth Every 12 (Twelve) Hours., Disp: , Rfl:   •  diltiazem XR (DILACOR XR) 240 MG 24 hr capsule, 1 capsule Daily., Disp: , Rfl:   •  furosemide (LASIX) 40 MG tablet, FUROSEMIDE 40 MG TABS, Disp: , Rfl:   •  hydrALAZINE (APRESOLINE) 25 MG tablet, , Disp: , Rfl:   •  isosorbide mononitrate (IMDUR) 30 MG 24 hr tablet, Daily., Disp: , Rfl:   •  Magnesium Oxide -Mg Supplement 400 MG capsule, MAGNESIUM 400 MG CAPS, Disp: , Rfl:   •  metFORMIN (FORTAMET) 500 MG (OSM) 24 hr tablet, Daily., Disp: , Rfl:   •  potassium chloride (KLOR-CON) 20 MEQ packet, KLOR-CON 20 MEQ PACK, Disp: , Rfl:   •  pravastatin (PRAVACHOL) 80 MG tablet, Daily., Disp: , Rfl:   •  ranitidine (ZANTAC) 150 MG capsule, RANITIDINE  MG CAPS, Disp: , Rfl:     Allergies   Allergen Reactions   • Bee Venom Anaphylaxis   • Gemfibrozil Hives       Family History   Problem Relation Age of Onset   • Lymphoma Brother 42        Non Hodgkins lymphoma   • Stroke Brother    • Heart attack Other         Extensive MI History on Paternal side       Cancer-related family history includes Lymphoma (age of onset: 42) in his brother.    Social History     Tobacco Use   • Smoking status: Former Smoker     Packs/day: 1.50     Years: 2.00     Pack years: 3.00     Types: Cigarettes   • Smokeless tobacco: Never Used   • Tobacco comment: smoked one and a half packs of cigarettes a day for two years quitting at age 36   Substance Use Topics   • Alcohol use: Yes     Alcohol/week: 1.2 oz     Types: 2 Cans of beer per week     Comment: drinks two beers monthly   • Drug use: No       I have reviewed the history of present illness, past medical history, family history, social history, lab results, all notes and other records since the patient was last seen on 7/29/19.    SUBJECTIVE: Patient is here for follow up of recurrent left lower extremity deep venous thrombosis, ITP, JACIEL and polycythemia. Reports that he had right hand surgery and everything went fine. Denies any  "bleeding. Is taking Eliquis 2.5 mg by mouth BID.     Neelam WaldenHODAN present during office visit.       REVIEW OF SYSTEMS:  Review of Systems   Constitutional: Negative for chills and fever.   HENT: Negative for ear pain, mouth sores, nosebleeds and sore throat.    Eyes: Negative for photophobia and visual disturbance.   Respiratory: Negative for wheezing and stridor.    Cardiovascular: Negative for chest pain and palpitations.   Gastrointestinal: Negative for abdominal pain, diarrhea, nausea and vomiting.   Endocrine: Negative for cold intolerance and heat intolerance.   Genitourinary: Negative for dysuria and hematuria.   Musculoskeletal: Negative for joint swelling and neck stiffness.   Skin: Negative for color change and rash.   Neurological: Negative for seizures and syncope.   Hematological: Negative for adenopathy.        No obvious bleeding   Psychiatric/Behavioral: Negative for agitation, confusion and hallucinations.       OBJECTIVE:    Vitals:    10/31/19 1158   BP: 152/81   Pulse: 62   Resp: 20   Temp: 97.7 °F (36.5 °C)   Weight: (!) 149 kg (329 lb 3.2 oz)   Height: 180.3 cm (71\")   PainSc: 0-No pain       ECOG  (1) Restricted in physically strenuous activity, ambulatory and able to do work of light nature    Physical Exam   Constitutional: He is oriented to person, place, and time. No distress.   HENT:   Head: Normocephalic and atraumatic.   Dental fillings.    Eyes: Conjunctivae and EOM are normal. Right eye exhibits no discharge. Left eye exhibits no discharge. No scleral icterus.   Eyeglasses.    Neck: Normal range of motion. Neck supple. No thyromegaly present.   Cardiovascular: Normal rate, regular rhythm and normal heart sounds. Exam reveals no gallop and no friction rub.   Pulmonary/Chest: Effort normal. No stridor. No respiratory distress. He has no wheezes.   Abdominal: Soft. Bowel sounds are normal. He exhibits no mass. There is no tenderness. There is no rebound and no guarding.   Obese.  "   Musculoskeletal: Normal range of motion. He exhibits no tenderness.   1+ left lower extremity edema.    Lymphadenopathy:     He has no cervical adenopathy.   Neurological: He is alert and oriented to person, place, and time. He exhibits normal muscle tone.   Skin: Skin is warm. No rash noted. He is not diaphoretic. No erythema.   Psychiatric: He has a normal mood and affect. His behavior is normal.   Nursing note and vitals reviewed.    RECENT LABS  WBC   Date Value Ref Range Status   10/31/2019 7.51 3.40 - 10.80 10*3/mm3 Final     RBC   Date Value Ref Range Status   10/31/2019 5.79 4.14 - 5.80 10*6/mm3 Final     Hemoglobin   Date Value Ref Range Status   10/31/2019 16.8 13.0 - 17.7 g/dL Final     Hematocrit   Date Value Ref Range Status   10/31/2019 48.4 37.5 - 51.0 % Final     MCV   Date Value Ref Range Status   10/31/2019 83.6 79.0 - 97.0 fL Final     MCH   Date Value Ref Range Status   10/31/2019 29.0 26.6 - 33.0 pg Final     MCHC   Date Value Ref Range Status   10/31/2019 34.7 31.5 - 35.7 g/dL Final     RDW   Date Value Ref Range Status   10/31/2019 14.1 12.3 - 15.4 % Final     RDW-SD   Date Value Ref Range Status   10/31/2019 42.4 37.0 - 54.0 fl Final     MPV   Date Value Ref Range Status   10/31/2019 11.2 6.0 - 12.0 fL Final     Platelets   Date Value Ref Range Status   10/31/2019 135 (L) 140 - 450 10*3/mm3 Final     Neutrophil %   Date Value Ref Range Status   10/31/2019 48.6 42.7 - 76.0 % Final     Lymphocyte %   Date Value Ref Range Status   10/31/2019 39.7 19.6 - 45.3 % Final     Monocyte %   Date Value Ref Range Status   10/31/2019 9.7 5.0 - 12.0 % Final     Eosinophil %   Date Value Ref Range Status   10/31/2019 1.7 0.3 - 6.2 % Final     Basophil %   Date Value Ref Range Status   10/31/2019 0.3 0.0 - 1.5 % Final     Neutrophils, Absolute   Date Value Ref Range Status   10/31/2019 3.65 1.70 - 7.00 10*3/mm3 Final     Lymphocytes, Absolute   Date Value Ref Range Status   10/31/2019 2.98 0.70 - 3.10  10*3/mm3 Final     Monocytes, Absolute   Date Value Ref Range Status   10/31/2019 0.73 0.10 - 0.90 10*3/mm3 Final     Eosinophils, Absolute   Date Value Ref Range Status   10/31/2019 0.13 0.00 - 0.40 10*3/mm3 Final     Basophils, Absolute   Date Value Ref Range Status   10/31/2019 0.02 0.00 - 0.20 10*3/mm3 Final     nRBC   Date Value Ref Range Status   12/06/2018 0 0 /100[WBCs] Final       Lab Results   Component Value Date    GLUCOSE 128 (H) 08/23/2019    BUN 14 08/23/2019    CREATININE 1.40 (H) 08/23/2019    EGFRIFNONA 51 (L) 08/23/2019    BCR 10.0 08/23/2019    K 4.4 08/23/2019    CO2 25.0 08/23/2019    CALCIUM 8.8 (L) 08/23/2019    ALBUMIN 3.80 08/23/2019    LABIL2 1.4 03/19/2019    AST 22 08/23/2019    ALT 28 08/23/2019       ASSESSMENT:    Chronic ITP (idiopathic thrombocytopenic purpura) (CMS/HCC)    Recurrent deep vein thrombosis (DVT) of left lower extremity (CMS/HCC)    Heterozygous factor V Leiden mutation (CMS/HCC)    Polycythemia    G6PD deficiency    Low red blood cell hexokinase activity    Long term (current) use of anticoagulants    Stage 3 chronic kidney disease (CMS/[facility])    The patient claims to be feeling well with no nosebleeds, gum bleeds, blood in the urine or blood in the stool.  He has had hand surgery done since last time please call us with no complications.  He continues on Eliquis 2.5 mg p.o. twice daily.  His hemoglobin is 16.8 and platelet count is 135,000 today.  His left lower extremity remains mildly edematous.  He has had no change in his medications.  Made him aware of signs to watch for drop in platelet count.      PLAN:  Continue Eliquis 2.5 mg by mouth BID.   Follow CBC.           I have reviewed lab results, imaging, vitals and medications with the patient today. Will follow up in 3 months with NP x2.     Patient verbalized understanding and is in agreement of the above plan.    I have reviewed and validated the information above.   Chris Ortiz M.D., F.A.C.P.

## 2019-10-31 ENCOUNTER — APPOINTMENT (OUTPATIENT)
Dept: LAB | Facility: HOSPITAL | Age: 64
End: 2019-10-31

## 2019-10-31 ENCOUNTER — OFFICE VISIT (OUTPATIENT)
Dept: ONCOLOGY | Facility: CLINIC | Age: 64
End: 2019-10-31

## 2019-10-31 VITALS
RESPIRATION RATE: 20 BRPM | BODY MASS INDEX: 44.1 KG/M2 | TEMPERATURE: 97.7 F | WEIGHT: 315 LBS | DIASTOLIC BLOOD PRESSURE: 81 MMHG | HEART RATE: 62 BPM | SYSTOLIC BLOOD PRESSURE: 152 MMHG | HEIGHT: 71 IN

## 2019-10-31 DIAGNOSIS — N18.30 STAGE 3 CHRONIC KIDNEY DISEASE (HCC): ICD-10-CM

## 2019-10-31 DIAGNOSIS — Z79.01 LONG TERM (CURRENT) USE OF ANTICOAGULANTS: ICD-10-CM

## 2019-10-31 DIAGNOSIS — D69.3 CHRONIC ITP (IDIOPATHIC THROMBOCYTOPENIC PURPURA) (HCC): Primary | ICD-10-CM

## 2019-10-31 DIAGNOSIS — D75.1 POLYCYTHEMIA: ICD-10-CM

## 2019-10-31 DIAGNOSIS — I82.402 RECURRENT DEEP VEIN THROMBOSIS (DVT) OF LEFT LOWER EXTREMITY (HCC): ICD-10-CM

## 2019-10-31 DIAGNOSIS — D68.51 HETEROZYGOUS FACTOR V LEIDEN MUTATION (HCC): ICD-10-CM

## 2019-10-31 DIAGNOSIS — R71.8: ICD-10-CM

## 2019-10-31 DIAGNOSIS — D75.A G6PD DEFICIENCY: ICD-10-CM

## 2019-10-31 LAB
BASOPHILS # BLD AUTO: 0.02 10*3/MM3 (ref 0–0.2)
BASOPHILS NFR BLD AUTO: 0.3 % (ref 0–1.5)
DEPRECATED RDW RBC AUTO: 42.4 FL (ref 37–54)
EOSINOPHIL # BLD AUTO: 0.13 10*3/MM3 (ref 0–0.4)
EOSINOPHIL NFR BLD AUTO: 1.7 % (ref 0.3–6.2)
ERYTHROCYTE [DISTWIDTH] IN BLOOD BY AUTOMATED COUNT: 14.1 % (ref 12.3–15.4)
HCT VFR BLD AUTO: 48.4 % (ref 37.5–51)
HGB BLD-MCNC: 16.8 G/DL (ref 13–17.7)
LYMPHOCYTES # BLD AUTO: 2.98 10*3/MM3 (ref 0.7–3.1)
LYMPHOCYTES NFR BLD AUTO: 39.7 % (ref 19.6–45.3)
MCH RBC QN AUTO: 29 PG (ref 26.6–33)
MCHC RBC AUTO-ENTMCNC: 34.7 G/DL (ref 31.5–35.7)
MCV RBC AUTO: 83.6 FL (ref 79–97)
MONOCYTES # BLD AUTO: 0.73 10*3/MM3 (ref 0.1–0.9)
MONOCYTES NFR BLD AUTO: 9.7 % (ref 5–12)
NEUTROPHILS # BLD AUTO: 3.65 10*3/MM3 (ref 1.7–7)
NEUTROPHILS NFR BLD AUTO: 48.6 % (ref 42.7–76)
PLATELET # BLD AUTO: 135 10*3/MM3 (ref 140–450)
PMV BLD AUTO: 11.2 FL (ref 6–12)
RBC # BLD AUTO: 5.79 10*6/MM3 (ref 4.14–5.8)
WBC NRBC COR # BLD: 7.51 10*3/MM3 (ref 3.4–10.8)

## 2019-10-31 PROCEDURE — 36415 COLL VENOUS BLD VENIPUNCTURE: CPT

## 2019-10-31 PROCEDURE — 85025 COMPLETE CBC W/AUTO DIFF WBC: CPT

## 2019-10-31 PROCEDURE — 99214 OFFICE O/P EST MOD 30 MIN: CPT | Performed by: INTERNAL MEDICINE

## 2019-10-31 RX ORDER — HYDRALAZINE HYDROCHLORIDE 25 MG/1
25 TABLET, FILM COATED ORAL 2 TIMES DAILY
COMMUNITY
Start: 2019-09-04

## 2019-11-18 ENCOUNTER — OFFICE VISIT (OUTPATIENT)
Dept: CARDIOLOGY | Facility: CLINIC | Age: 64
End: 2019-11-18

## 2019-11-18 VITALS
SYSTOLIC BLOOD PRESSURE: 148 MMHG | DIASTOLIC BLOOD PRESSURE: 84 MMHG | WEIGHT: 315 LBS | HEART RATE: 61 BPM | BODY MASS INDEX: 46.23 KG/M2 | OXYGEN SATURATION: 97 %

## 2019-11-18 DIAGNOSIS — I82.402 RECURRENT DEEP VEIN THROMBOSIS (DVT) OF LEFT LOWER EXTREMITY (HCC): Primary | ICD-10-CM

## 2019-11-18 DIAGNOSIS — N18.30 STAGE 3 CHRONIC KIDNEY DISEASE (HCC): ICD-10-CM

## 2019-11-18 DIAGNOSIS — Z79.01 LONG TERM (CURRENT) USE OF ANTICOAGULANTS: ICD-10-CM

## 2019-11-18 DIAGNOSIS — I48.0 PAROXYSMAL ATRIAL FIBRILLATION (HCC): ICD-10-CM

## 2019-11-18 PROCEDURE — 99214 OFFICE O/P EST MOD 30 MIN: CPT | Performed by: INTERNAL MEDICINE

## 2019-11-18 NOTE — PROGRESS NOTES
Encounter Date:11/18/2019  Last seen 5/20/2019      Patient ID: Seth Torres is a 64 y.o. male.    Chief Complaint:  Follow-up atrial fibrillation  Coronary artery disease  Anticoagulation management  Renal dysfunction      History of Present Illness  Since I have last seen, the patient has been without any chest discomfort ,shortness of breath, palpitations, dizziness or syncope.  Denies having any headache ,abdominal pain ,nausea, vomiting , diarrhea constipation, loss of weight or loss of appetite.  Denies having any excessive bruising ,hematuria or blood in the stool.    Review of all systems negative except as indicated    Assessment and Plan     ]]]]]]]]]]]]]]]]]]]]   impression  ==========   -history of atrial fibrillation -converted and maintaining sinus rhythm.   Echocardiogram showed normal left ventricular function without any pericardial effusion.  June 2018.      Patient had pneumonia and febrile illness associated with atrial fibrillatio  -Pneumonia-left lower lobe seen on chest x-ray as well of conformed by CT scan.      -stable angina pectoris   mild coronary artery disease.  Cardiac catheterization 2012 revealed 50-60% lad disease     -anticoagulation -on Eliquis as long-term treatment for DVT      -Renal dysfunction bun35 cr 2.4 hypertension dyslipidemia      - history of DVT Status post thrombectomy.  Status post IVC filter placement     -allergic to bee venom lopid  =======  Plan  ==========  EKG today showed sinus rhythm without any ischemic changes  Medications were reviewed and updated.  patient is off amiodarone  Continue Cardizem and Eliquis  Followup in the office in  6 months with EKG  Continue Eliquis as long-term therapy for DVT etc  Further plan will depend on patient's progress.  [[[[[[[[[[[[[[[[[[              Diagnosis Plan   1. Recurrent deep vein thrombosis (DVT) of left lower extremity (CMS/HCC)     2. Stage 3 chronic kidney disease (CMS/HCC)     3. Long term (current) use  of anticoagulants     4. Paroxysmal atrial fibrillation (CMS/HCC)     LAB RESULTS (LAST 7 DAYS)    CBC        BMP        CMP         BNP        TROPONIN        CoAg        Creatinine Clearance  CrCl cannot be calculated (Patient's most recent lab result is older than the maximum 30 days allowed.).    ABG        Radiology  No radiology results for the last day                The following portions of the patient's history were reviewed and updated as appropriate: allergies, current medications, past family history, past medical history, past social history, past surgical history and problem list.    Review of Systems   Constitution: Negative for malaise/fatigue.   Cardiovascular: Negative for chest pain, leg swelling, palpitations and syncope.   Respiratory: Negative for shortness of breath.    Skin: Negative for rash.   Gastrointestinal: Negative for nausea and vomiting.   Neurological: Negative for dizziness, light-headedness and numbness.         Current Outpatient Medications:   •  apixaban (ELIQUIS) 2.5 MG tablet tablet, Take 2.5 mg by mouth Every 12 (Twelve) Hours., Disp: , Rfl:   •  diltiazem XR (DILACOR XR) 240 MG 24 hr capsule, 1 capsule Daily., Disp: , Rfl:   •  furosemide (LASIX) 40 MG tablet, FUROSEMIDE 40 MG TABS, Disp: , Rfl:   •  hydrALAZINE (APRESOLINE) 25 MG tablet, , Disp: , Rfl:   •  isosorbide mononitrate (IMDUR) 30 MG 24 hr tablet, Daily., Disp: , Rfl:   •  Magnesium Oxide -Mg Supplement 400 MG capsule, MAGNESIUM 400 MG CAPS, Disp: , Rfl:   •  metFORMIN (FORTAMET) 500 MG (OSM) 24 hr tablet, Daily., Disp: , Rfl:   •  potassium chloride (KLOR-CON) 20 MEQ packet, KLOR-CON 20 MEQ PACK, Disp: , Rfl:   •  pravastatin (PRAVACHOL) 80 MG tablet, Daily., Disp: , Rfl:   •  ranitidine (ZANTAC) 150 MG capsule, RANITIDINE  MG CAPS, Disp: , Rfl:     Allergies   Allergen Reactions   • Bee Venom Anaphylaxis   • Gemfibrozil Hives       Family History   Problem Relation Age of Onset   • Lymphoma Brother 42         Non Hodgkins lymphoma   • Stroke Brother    • Heart attack Other         Extensive MI History on Paternal side       Past Surgical History:   Procedure Laterality Date   • CYSTOSCOPY URETEROSCOPY Left 12/10/2018    Dr. Ty   • HAND SURGERY Left     Ganglion cyst removed palm of left hand   • HAND SURGERY  08/21/2019   • REPLACEMENT TOTAL KNEE Bilateral     one in 2001 and the other in 2002   • SEPTOPLASTY      deviated septum       Past Medical History:   Diagnosis Date   • CAD (coronary artery disease) 1997   • CKD (chronic kidney disease), stage III (CMS/[facility]) 06/2018    Dr. West   • Diverticulosis 10/2016   • DJD (degenerative joint disease)    • DVT, lower extremity, recurrent, left (CMS/[facility]) 02/2017   • Elevated factor VIII level 2018   • Hyperlipidemia 1997   • Hypertension 1997   • Pneumonia 2018 January 2018 and June 2018   • Renal calculi 09/2016   • Sleep apnea 2018    Dr. Grier       Family History   Problem Relation Age of Onset   • Lymphoma Brother 42        Non Hodgkins lymphoma   • Stroke Brother    • Heart attack Other         Extensive MI History on Paternal side       Social History     Socioeconomic History   • Marital status:      Spouse name: Not on file   • Number of children: Not on file   • Years of education: Not on file   • Highest education level: Not on file   Tobacco Use   • Smoking status: Former Smoker     Packs/day: 1.50     Years: 2.00     Pack years: 3.00     Types: Cigarettes   • Smokeless tobacco: Never Used   • Tobacco comment: smoked one and a half packs of cigarettes a day for two years quitting at age 36   Substance and Sexual Activity   • Alcohol use: Yes     Alcohol/week: 1.2 oz     Types: 2 Cans of beer per week     Comment: drinks two beers monthly   • Drug use: No         Procedures      Objective:       Physical Exam    /84   Pulse 61   Wt (!) 150 kg (331 lb 8 oz)   SpO2 97%   BMI 46.23 kg/m²   The patient is alert, oriented and in no  distress.    Vital signs as noted above.    Head and neck revealed no carotid bruits or jugular venous distension.  No thyromegaly or lymphadenopathy is present.    Lungs clear.  No wheezing.  Breath sounds are normal bilaterally.    Heart normal first and second heart sounds.  No murmur..  No pericardial rub is present.  No gallop is present.    Abdomen soft and nontender.  No organomegaly is present.    Extremities revealed good peripheral pulses without any pedal edema.    Skin warm and dry.    Musculoskeletal system is grossly normal.    CNS grossly normal.

## 2019-11-20 ENCOUNTER — TRANSCRIBE ORDERS (OUTPATIENT)
Dept: ADMINISTRATIVE | Facility: HOSPITAL | Age: 64
End: 2019-11-20

## 2019-11-20 ENCOUNTER — HOSPITAL ENCOUNTER (OUTPATIENT)
Dept: GENERAL RADIOLOGY | Facility: HOSPITAL | Age: 64
Discharge: HOME | End: 2019-11-20
Admitting: UROLOGY

## 2019-11-20 ENCOUNTER — LAB (OUTPATIENT)
Dept: LAB | Facility: HOSPITAL | Age: 64
End: 2019-11-20

## 2019-11-20 DIAGNOSIS — N20.0 CALCULUS, RENAL: ICD-10-CM

## 2019-11-20 DIAGNOSIS — N20.1 CALCULUS OF URETER: ICD-10-CM

## 2019-11-20 DIAGNOSIS — N20.0 CALCULUS, RENAL: Primary | ICD-10-CM

## 2019-11-20 LAB — PSA SERPL-MCNC: 3.07 NG/ML (ref 0–4)

## 2019-11-20 PROCEDURE — 36415 COLL VENOUS BLD VENIPUNCTURE: CPT

## 2019-11-20 PROCEDURE — G0103 PSA SCREENING: HCPCS

## 2019-11-20 PROCEDURE — 74018 RADEX ABDOMEN 1 VIEW: CPT

## 2019-11-21 ENCOUNTER — HOSPITAL ENCOUNTER (OUTPATIENT)
Dept: CARDIOLOGY | Facility: HOSPITAL | Age: 64
Discharge: HOME | End: 2019-11-21
Admitting: UROLOGY

## 2019-11-21 ENCOUNTER — TRANSCRIBE ORDERS (OUTPATIENT)
Dept: ADMINISTRATIVE | Facility: HOSPITAL | Age: 64
End: 2019-11-21

## 2019-11-21 ENCOUNTER — LAB (OUTPATIENT)
Dept: LAB | Facility: HOSPITAL | Age: 64
End: 2019-11-21

## 2019-11-21 DIAGNOSIS — Z01.818 PRE-OP TESTING: ICD-10-CM

## 2019-11-21 DIAGNOSIS — Z01.818 PRE-OP TESTING: Primary | ICD-10-CM

## 2019-11-21 LAB
BASOPHILS # BLD AUTO: 0.04 10*3/MM3 (ref 0–0.2)
BASOPHILS NFR BLD AUTO: 0.6 % (ref 0–1.5)
DEPRECATED RDW RBC AUTO: 41.2 FL (ref 37–54)
EOSINOPHIL # BLD AUTO: 0.13 10*3/MM3 (ref 0–0.4)
EOSINOPHIL NFR BLD AUTO: 1.8 % (ref 0.3–6.2)
ERYTHROCYTE [DISTWIDTH] IN BLOOD BY AUTOMATED COUNT: 13.3 % (ref 12.3–15.4)
HCT VFR BLD AUTO: 49.5 % (ref 37.5–51)
HGB BLD-MCNC: 16.4 G/DL (ref 13–17.7)
IMM GRANULOCYTES # BLD AUTO: 0.01 10*3/MM3 (ref 0–0.05)
IMM GRANULOCYTES NFR BLD AUTO: 0.1 % (ref 0–0.5)
LYMPHOCYTES # BLD AUTO: 2.65 10*3/MM3 (ref 0.7–3.1)
LYMPHOCYTES NFR BLD AUTO: 37 % (ref 19.6–45.3)
MCH RBC QN AUTO: 28.1 PG (ref 26.6–33)
MCHC RBC AUTO-ENTMCNC: 33.1 G/DL (ref 31.5–35.7)
MCV RBC AUTO: 84.9 FL (ref 79–97)
MONOCYTES # BLD AUTO: 0.57 10*3/MM3 (ref 0.1–0.9)
MONOCYTES NFR BLD AUTO: 7.9 % (ref 5–12)
NEUTROPHILS # BLD AUTO: 3.77 10*3/MM3 (ref 1.7–7)
NEUTROPHILS NFR BLD AUTO: 52.6 % (ref 42.7–76)
NRBC BLD AUTO-RTO: 0 /100 WBC (ref 0–0.2)
PLATELET # BLD AUTO: 179 10*3/MM3 (ref 140–450)
PMV BLD AUTO: 11 FL (ref 6–12)
RBC # BLD AUTO: 5.83 10*6/MM3 (ref 4.14–5.8)
WBC NRBC COR # BLD: 7.17 10*3/MM3 (ref 3.4–10.8)

## 2019-11-21 PROCEDURE — 85025 COMPLETE CBC W/AUTO DIFF WBC: CPT

## 2019-11-21 PROCEDURE — 93005 ELECTROCARDIOGRAM TRACING: CPT | Performed by: UROLOGY

## 2019-11-21 PROCEDURE — 36415 COLL VENOUS BLD VENIPUNCTURE: CPT

## 2019-11-22 ENCOUNTER — LAB (OUTPATIENT)
Dept: LAB | Facility: HOSPITAL | Age: 64
End: 2019-11-22

## 2019-11-22 ENCOUNTER — TRANSCRIBE ORDERS (OUTPATIENT)
Dept: ADMINISTRATIVE | Facility: HOSPITAL | Age: 64
End: 2019-11-22

## 2019-11-22 DIAGNOSIS — N20.0 RENAL CALCULUS, LEFT: Primary | ICD-10-CM

## 2019-11-22 DIAGNOSIS — Z01.818 PRE-OP TESTING: ICD-10-CM

## 2019-11-22 DIAGNOSIS — N20.0 RENAL CALCULUS, LEFT: ICD-10-CM

## 2019-11-22 LAB
ANION GAP SERPL CALCULATED.3IONS-SCNC: 13.7 MMOL/L (ref 5–15)
BUN BLD-MCNC: 14 MG/DL (ref 8–23)
BUN/CREAT SERPL: 11.2 (ref 7–25)
CALCIUM SPEC-SCNC: 9.4 MG/DL (ref 8.6–10.5)
CHLORIDE SERPL-SCNC: 102 MMOL/L (ref 98–107)
CO2 SERPL-SCNC: 28.3 MMOL/L (ref 22–29)
CREAT BLD-MCNC: 1.25 MG/DL (ref 0.76–1.27)
GFR SERPL CREATININE-BSD FRML MDRD: 58 ML/MIN/1.73
GLUCOSE BLD-MCNC: 82 MG/DL (ref 65–99)
POTASSIUM BLD-SCNC: 4.8 MMOL/L (ref 3.5–5.2)
SODIUM BLD-SCNC: 144 MMOL/L (ref 136–145)

## 2019-11-22 PROCEDURE — 80048 BASIC METABOLIC PNL TOTAL CA: CPT

## 2019-11-24 PROCEDURE — 93010 ELECTROCARDIOGRAM REPORT: CPT | Performed by: INTERNAL MEDICINE

## 2019-12-13 ENCOUNTER — HOSPITAL ENCOUNTER (OUTPATIENT)
Dept: GENERAL RADIOLOGY | Facility: HOSPITAL | Age: 64
Discharge: HOME | End: 2019-12-13
Admitting: UROLOGY

## 2019-12-13 ENCOUNTER — TRANSCRIBE ORDERS (OUTPATIENT)
Dept: ADMINISTRATIVE | Facility: HOSPITAL | Age: 64
End: 2019-12-13

## 2019-12-13 DIAGNOSIS — N20.0 CALCULUS, RENAL: Primary | ICD-10-CM

## 2019-12-13 DIAGNOSIS — N20.0 CALCULUS, RENAL: ICD-10-CM

## 2019-12-13 PROCEDURE — 74018 RADEX ABDOMEN 1 VIEW: CPT

## 2020-01-20 ENCOUNTER — TELEPHONE (OUTPATIENT)
Dept: ONCOLOGY | Facility: CLINIC | Age: 65
End: 2020-01-20

## 2020-01-20 RX ORDER — APIXABAN 5 MG/1
TABLET, FILM COATED ORAL
Qty: 30 TABLET | Refills: 0 | OUTPATIENT
Start: 2020-01-20

## 2020-05-18 ENCOUNTER — TELEMEDICINE (OUTPATIENT)
Dept: CARDIOLOGY | Facility: CLINIC | Age: 65
End: 2020-05-18

## 2020-05-18 VITALS
BODY MASS INDEX: 44.1 KG/M2 | WEIGHT: 315 LBS | HEIGHT: 71 IN | SYSTOLIC BLOOD PRESSURE: 100 MMHG | HEART RATE: 64 BPM | DIASTOLIC BLOOD PRESSURE: 75 MMHG

## 2020-05-18 DIAGNOSIS — Z79.01 LONG TERM (CURRENT) USE OF ANTICOAGULANTS: ICD-10-CM

## 2020-05-18 DIAGNOSIS — N18.30 STAGE 3 CHRONIC KIDNEY DISEASE (HCC): ICD-10-CM

## 2020-05-18 DIAGNOSIS — I48.0 PAROXYSMAL ATRIAL FIBRILLATION (HCC): ICD-10-CM

## 2020-05-18 DIAGNOSIS — I82.402 RECURRENT DEEP VEIN THROMBOSIS (DVT) OF LEFT LOWER EXTREMITY (HCC): Primary | ICD-10-CM

## 2020-05-18 PROCEDURE — 99214 OFFICE O/P EST MOD 30 MIN: CPT | Performed by: INTERNAL MEDICINE

## 2020-05-18 RX ORDER — OMEPRAZOLE 40 MG/1
40 CAPSULE, DELAYED RELEASE ORAL DAILY
COMMUNITY

## 2020-05-18 NOTE — PROGRESS NOTES
You have chosen to receive care through a telehealth visit.  Do you consent to use a video/audio connection for your medical care today? Yes    Encounter Date:05/18/2020 11/18/2019      Patient ID: Seth Torres is a 64 y.o. male.    Chief Complaint:  Follow-up atrial fibrillation  Coronary artery disease  Anticoagulation management  Renal dysfunction        History of Present Illness  Since I have last seen, the patient has been without any chest discomfort ,shortness of breath, palpitations, dizziness or syncope.  Denies having any headache ,abdominal pain ,nausea, vomiting , diarrhea constipation, loss of weight or loss of appetite.  Denies having any excessive bruising ,hematuria or blood in the stool.     Review of all systems negative except as indicated     Assessment and Plan      ]]]]]]]]]]]]]]]]]]]]   impression  ==========   -history of atrial fibrillation -converted and maintaining sinus rhythm.   Echocardiogram showed normal left ventricular function without any pericardial effusion.  June 2018.      Patient had pneumonia and febrile illness associated with atrial fibrillatio  -Pneumonia-left lower lobe seen on chest x-ray as well of conformed by CT scan.      -stable angina pectoris   mild coronary artery disease.  Cardiac catheterization 2012 revealed 50-60% lad disease     -anticoagulation -on Eliquis as long-term treatment for DVT      -Renal dysfunction bun35 cr 2.4 hypertension dyslipidemia      - history of DVT Status post thrombectomy.  Status post IVC filter placement     -allergic to bee venom lopid  =======  Plan  ==========  Video visit  Medications were reviewed and updated.  patient is off amiodarone  Continue Cardizem and Eliquis  Followup in the office in  6 months with EKG  Continue Eliquis as long-term therapy for DVT etc  Further plan will depend on patient's progress.  [[[[[[[[[[[[[[[[[[                      Diagnosis Plan   1. Recurrent deep vein thrombosis (DVT) of left lower  extremity (CMS/HCC)     2. Stage 3 chronic kidney disease (CMS/HCC)     3. Long term (current) use of anticoagulants     4. Paroxysmal atrial fibrillation (CMS/HCC)     LAB RESULTS (LAST 7 DAYS)    CBC        BMP        CMP         BNP        TROPONIN        CoAg        Creatinine Clearance  CrCl cannot be calculated (Patient's most recent lab result is older than the maximum 30 days allowed.).    ABG        Radiology  No radiology results for the last day                The following portions of the patient's history were reviewed and updated as appropriate: allergies, current medications, past family history, past medical history, past social history, past surgical history and problem list.    Review of Systems   Constitution: Negative for malaise/fatigue.   Cardiovascular: Positive for leg swelling (goes down at night). Negative for chest pain, palpitations and syncope.   Respiratory: Negative for shortness of breath.    Skin: Negative for rash.   Gastrointestinal: Negative for nausea and vomiting.   Neurological: Negative for dizziness, light-headedness and numbness.         Current Outpatient Medications:   •  apixaban (Eliquis) 5 MG tablet tablet, Take 2.5 mg by mouth Every 12 (Twelve) Hours., Disp: , Rfl:   •  diltiazem XR (DILACOR XR) 240 MG 24 hr capsule, 1 capsule Daily., Disp: , Rfl:   •  furosemide (LASIX) 40 MG tablet, FUROSEMIDE 40 MG TABS, Disp: , Rfl:   •  hydrALAZINE (APRESOLINE) 25 MG tablet, Take 25 mg by mouth 2 (Two) Times a Day., Disp: , Rfl:   •  isosorbide mononitrate (IMDUR) 30 MG 24 hr tablet, Daily., Disp: , Rfl:   •  Magnesium Oxide -Mg Supplement 400 MG capsule, MAGNESIUM 400 MG CAPS, Disp: , Rfl:   •  metFORMIN (FORTAMET) 500 MG (OSM) 24 hr tablet, Daily., Disp: , Rfl:   •  omeprazole (priLOSEC) 40 MG capsule, Take 40 mg by mouth Daily., Disp: , Rfl:   •  potassium chloride (KLOR-CON) 20 MEQ packet, Take 20 mEq by mouth Daily., Disp: , Rfl:   •  pravastatin (PRAVACHOL) 80 MG tablet,  Daily., Disp: , Rfl:     Allergies   Allergen Reactions   • Bee Venom Anaphylaxis   • Gemfibrozil Hives       Family History   Problem Relation Age of Onset   • Lymphoma Brother 42        Non Hodgkins lymphoma   • Stroke Brother    • Heart attack Other         Extensive MI History on Paternal side       Past Surgical History:   Procedure Laterality Date   • CYSTOSCOPY URETEROSCOPY Left 12/10/2018    Dr. Ty   • HAND SURGERY Left     Ganglion cyst removed palm of left hand   • HAND SURGERY  08/21/2019   • REPLACEMENT TOTAL KNEE Bilateral     one in 2001 and the other in 2002   • SEPTOPLASTY      deviated septum       Past Medical History:   Diagnosis Date   • CAD (coronary artery disease) 1997   • CKD (chronic kidney disease), stage III (CMS/[facility]) 06/2018    Dr. West   • Diverticulosis 10/2016   • DJD (degenerative joint disease)    • DVT, lower extremity, recurrent, left (CMS/[facility]) 02/2017   • Elevated factor VIII level 2018   • Hyperlipidemia 1997   • Hypertension 1997   • Pneumonia 2018 January 2018 and June 2018   • Renal calculi 09/2016   • Sleep apnea 2018    Dr. Grier       Family History   Problem Relation Age of Onset   • Lymphoma Brother 42        Non Hodgkins lymphoma   • Stroke Brother    • Heart attack Other         Extensive MI History on Paternal side       Social History     Socioeconomic History   • Marital status:      Spouse name: Not on file   • Number of children: Not on file   • Years of education: Not on file   • Highest education level: Not on file   Tobacco Use   • Smoking status: Former Smoker     Packs/day: 1.50     Years: 2.00     Pack years: 3.00     Types: Cigarettes   • Smokeless tobacco: Never Used   • Tobacco comment: smoked one and a half packs of cigarettes a day for two years quitting at age 36   Substance and Sexual Activity   • Alcohol use: Yes     Alcohol/week: 2.0 standard drinks     Types: 2 Cans of beer per week     Comment: drinks two beers monthly   • Drug  "use: No         Procedures      Objective:       Physical Exam    /75   Pulse 64   Ht 180.3 cm (71\")   Wt (!) 143 kg (315 lb)   BMI 43.93 kg/m²   The patient is alert, oriented and in no distress.    Vital signs as noted above.    Speech is normal.    No visible or audible shortness of breath    No visible jugular venous distention or thyromegaly.    No pedal edema    Skin-dry-no sweating    CNS grossly normal.        "

## 2020-09-24 ENCOUNTER — TRANSCRIBE ORDERS (OUTPATIENT)
Dept: ADMINISTRATIVE | Facility: HOSPITAL | Age: 65
End: 2020-09-24

## 2020-09-24 ENCOUNTER — LAB (OUTPATIENT)
Dept: LAB | Facility: HOSPITAL | Age: 65
End: 2020-09-24

## 2020-09-24 ENCOUNTER — HOSPITAL ENCOUNTER (OUTPATIENT)
Dept: CARDIOLOGY | Facility: HOSPITAL | Age: 65
Discharge: HOME | End: 2020-09-24

## 2020-09-24 DIAGNOSIS — I48.91 ATRIAL FIBRILLATION, UNSPECIFIED TYPE (HCC): Primary | ICD-10-CM

## 2020-09-24 DIAGNOSIS — I48.0 PAROXYSMAL ATRIAL FIBRILLATION (HCC): ICD-10-CM

## 2020-09-24 DIAGNOSIS — G47.33 OBSTRUCTIVE SLEEP APNEA: ICD-10-CM

## 2020-09-24 DIAGNOSIS — N18.30 CHRONIC RENAL DISEASE, STAGE III (HCC): ICD-10-CM

## 2020-09-24 DIAGNOSIS — I10 HYPERTENSION, UNSPECIFIED TYPE: ICD-10-CM

## 2020-09-24 DIAGNOSIS — I48.91 ATRIAL FIBRILLATION, UNSPECIFIED TYPE (HCC): ICD-10-CM

## 2020-09-24 DIAGNOSIS — E11.9 DIABETES MELLITUS, STABLE (HCC): ICD-10-CM

## 2020-09-24 DIAGNOSIS — E11.9 DIABETES MELLITUS, STABLE (HCC): Primary | ICD-10-CM

## 2020-09-24 DIAGNOSIS — K21.9 GASTRIC REFLUX: ICD-10-CM

## 2020-09-24 DIAGNOSIS — E55.9 VITAMIN D DEFICIENCY: ICD-10-CM

## 2020-09-24 DIAGNOSIS — N20.0 RENAL CALCULUS, LEFT: ICD-10-CM

## 2020-09-24 LAB
25(OH)D3 SERPL-MCNC: 48.7 NG/ML (ref 30–100)
ALBUMIN SERPL-MCNC: 4.2 G/DL (ref 3.5–5.2)
ALBUMIN/GLOB SERPL: 1.6 G/DL
ALP SERPL-CCNC: 95 U/L (ref 39–117)
ALT SERPL W P-5'-P-CCNC: 43 U/L (ref 1–41)
ANION GAP SERPL CALCULATED.3IONS-SCNC: 11.6 MMOL/L (ref 5–15)
AST SERPL-CCNC: 25 U/L (ref 1–40)
BASOPHILS # BLD AUTO: 0.05 10*3/MM3 (ref 0–0.2)
BASOPHILS NFR BLD AUTO: 0.7 % (ref 0–1.5)
BH CV XLRA MEAS LEFT CCA RATIO VEL: 93.3 CM/SEC
BH CV XLRA MEAS LEFT DIST CCA EDV: -23.2 CM/SEC
BH CV XLRA MEAS LEFT DIST CCA PSV: -91.3 CM/SEC
BH CV XLRA MEAS LEFT DIST ICA EDV: -26.2 CM/SEC
BH CV XLRA MEAS LEFT DIST ICA PSV: -84.2 CM/SEC
BH CV XLRA MEAS LEFT ICA RATIO VEL: -87 CM/SEC
BH CV XLRA MEAS LEFT ICA/CCA RATIO: -0.93
BH CV XLRA MEAS LEFT PROX CCA EDV: 18.5 CM/SEC
BH CV XLRA MEAS LEFT PROX CCA PSV: 93.3 CM/SEC
BH CV XLRA MEAS LEFT PROX ECA PSV: -116 CM/SEC
BH CV XLRA MEAS LEFT PROX ICA EDV: -27.1 CM/SEC
BH CV XLRA MEAS LEFT PROX ICA PSV: -87 CM/SEC
BH CV XLRA MEAS LEFT PROX SCLA PSV: 149 CM/SEC
BH CV XLRA MEAS LEFT VERTEBRAL A PSV: -47.2 CM/SEC
BH CV XLRA MEAS RIGHT CCA RATIO VEL: 103 CM/SEC
BH CV XLRA MEAS RIGHT DIST CCA EDV: 17.4 CM/SEC
BH CV XLRA MEAS RIGHT DIST CCA PSV: 93.8 CM/SEC
BH CV XLRA MEAS RIGHT DIST ICA EDV: -22.8 CM/SEC
BH CV XLRA MEAS RIGHT DIST ICA PSV: -81.2 CM/SEC
BH CV XLRA MEAS RIGHT ICA RATIO VEL: -81.2 CM/SEC
BH CV XLRA MEAS RIGHT ICA/CCA RATIO: -0.79
BH CV XLRA MEAS RIGHT PROX CCA EDV: 21.7 CM/SEC
BH CV XLRA MEAS RIGHT PROX CCA PSV: 103 CM/SEC
BH CV XLRA MEAS RIGHT PROX ECA PSV: -77.3 CM/SEC
BH CV XLRA MEAS RIGHT PROX ICA EDV: 24.6 CM/SEC
BH CV XLRA MEAS RIGHT PROX ICA PSV: 72.4 CM/SEC
BH CV XLRA MEAS RIGHT PROX SCLA PSV: 157 CM/SEC
BH CV XLRA MEAS RIGHT VERTEBRAL A PSV: 49.1 CM/SEC
BILIRUB SERPL-MCNC: 0.5 MG/DL (ref 0–1.2)
BUN SERPL-MCNC: 16 MG/DL (ref 8–23)
BUN/CREAT SERPL: 12.7 (ref 7–25)
CALCIUM SPEC-SCNC: 9.3 MG/DL (ref 8.6–10.5)
CHLORIDE SERPL-SCNC: 101 MMOL/L (ref 98–107)
CHOLEST SERPL-MCNC: 145 MG/DL (ref 0–200)
CO2 SERPL-SCNC: 26.4 MMOL/L (ref 22–29)
CREAT SERPL-MCNC: 1.26 MG/DL (ref 0.76–1.27)
DEPRECATED RDW RBC AUTO: 43.6 FL (ref 37–54)
EOSINOPHIL # BLD AUTO: 0.14 10*3/MM3 (ref 0–0.4)
EOSINOPHIL NFR BLD AUTO: 2.1 % (ref 0.3–6.2)
ERYTHROCYTE [DISTWIDTH] IN BLOOD BY AUTOMATED COUNT: 13.5 % (ref 12.3–15.4)
GFR SERPL CREATININE-BSD FRML MDRD: 58 ML/MIN/1.73
GLOBULIN UR ELPH-MCNC: 2.7 GM/DL
GLUCOSE SERPL-MCNC: 135 MG/DL (ref 65–99)
HBA1C MFR BLD: 6.8 % (ref 3.5–5.6)
HCT VFR BLD AUTO: 50.6 % (ref 37.5–51)
HDLC SERPL-MCNC: 44 MG/DL (ref 40–60)
HGB BLD-MCNC: 16.6 G/DL (ref 13–17.7)
IMM GRANULOCYTES # BLD AUTO: 0.01 10*3/MM3 (ref 0–0.05)
IMM GRANULOCYTES NFR BLD AUTO: 0.1 % (ref 0–0.5)
LDLC SERPL CALC-MCNC: 80 MG/DL (ref 0–100)
LDLC/HDLC SERPL: 1.81 {RATIO}
LYMPHOCYTES # BLD AUTO: 2.35 10*3/MM3 (ref 0.7–3.1)
LYMPHOCYTES NFR BLD AUTO: 34.8 % (ref 19.6–45.3)
MCH RBC QN AUTO: 28.8 PG (ref 26.6–33)
MCHC RBC AUTO-ENTMCNC: 32.8 G/DL (ref 31.5–35.7)
MCV RBC AUTO: 87.7 FL (ref 79–97)
MONOCYTES # BLD AUTO: 0.59 10*3/MM3 (ref 0.1–0.9)
MONOCYTES NFR BLD AUTO: 8.7 % (ref 5–12)
NEUTROPHILS NFR BLD AUTO: 3.61 10*3/MM3 (ref 1.7–7)
NEUTROPHILS NFR BLD AUTO: 53.6 % (ref 42.7–76)
NRBC BLD AUTO-RTO: 0 /100 WBC (ref 0–0.2)
PLATELET # BLD AUTO: 156 10*3/MM3 (ref 140–450)
PMV BLD AUTO: 11.6 FL (ref 6–12)
POTASSIUM SERPL-SCNC: 4.5 MMOL/L (ref 3.5–5.2)
PROT SERPL-MCNC: 6.9 G/DL (ref 6–8.5)
PSA SERPL-MCNC: 2.44 NG/ML (ref 0–4)
RBC # BLD AUTO: 5.77 10*6/MM3 (ref 4.14–5.8)
SODIUM SERPL-SCNC: 139 MMOL/L (ref 136–145)
TRIGL SERPL-MCNC: 106 MG/DL (ref 0–150)
VLDLC SERPL-MCNC: 21.2 MG/DL (ref 5–40)
WBC # BLD AUTO: 6.75 10*3/MM3 (ref 3.4–10.8)

## 2020-09-24 PROCEDURE — 82306 VITAMIN D 25 HYDROXY: CPT

## 2020-09-24 PROCEDURE — 36415 COLL VENOUS BLD VENIPUNCTURE: CPT

## 2020-09-24 PROCEDURE — 80061 LIPID PANEL: CPT

## 2020-09-24 PROCEDURE — 85025 COMPLETE CBC W/AUTO DIFF WBC: CPT

## 2020-09-24 PROCEDURE — G0103 PSA SCREENING: HCPCS

## 2020-09-24 PROCEDURE — 83036 HEMOGLOBIN GLYCOSYLATED A1C: CPT

## 2020-09-24 PROCEDURE — 93880 EXTRACRANIAL BILAT STUDY: CPT

## 2020-09-24 PROCEDURE — 80053 COMPREHEN METABOLIC PANEL: CPT

## 2020-11-06 ENCOUNTER — TRANSCRIBE ORDERS (OUTPATIENT)
Dept: LAB | Facility: HOSPITAL | Age: 65
End: 2020-11-06

## 2020-11-06 ENCOUNTER — LAB (OUTPATIENT)
Dept: LAB | Facility: HOSPITAL | Age: 65
End: 2020-11-06

## 2020-11-06 DIAGNOSIS — E11.8 DIABETIC COMPLICATION (HCC): ICD-10-CM

## 2020-11-06 DIAGNOSIS — E55.9 VITAMIN D DEFICIENCY: ICD-10-CM

## 2020-11-06 DIAGNOSIS — I10 HYPERTENSION, UNSPECIFIED TYPE: ICD-10-CM

## 2020-11-06 DIAGNOSIS — I13.10 MALIGNANT HYPERTENSIVE HEART AND CHRONIC KIDNEY DISEASE STAGE III (HCC): ICD-10-CM

## 2020-11-06 DIAGNOSIS — R80.9 PROTEINURIA, UNSPECIFIED TYPE: ICD-10-CM

## 2020-11-06 DIAGNOSIS — I13.10 MALIGNANT HYPERTENSIVE HEART AND CHRONIC KIDNEY DISEASE STAGE III (HCC): Primary | ICD-10-CM

## 2020-11-06 DIAGNOSIS — N18.30 MALIGNANT HYPERTENSIVE HEART AND CHRONIC KIDNEY DISEASE STAGE III (HCC): Primary | ICD-10-CM

## 2020-11-06 DIAGNOSIS — N18.30 MALIGNANT HYPERTENSIVE HEART AND CHRONIC KIDNEY DISEASE STAGE III (HCC): ICD-10-CM

## 2020-11-06 LAB
25(OH)D3 SERPL-MCNC: 49.7 NG/ML (ref 30–100)
ALBUMIN SERPL-MCNC: 4.7 G/DL (ref 3.5–5.2)
ALBUMIN/GLOB SERPL: 1.7 G/DL
ALP SERPL-CCNC: 102 U/L (ref 39–117)
ALT SERPL W P-5'-P-CCNC: 50 U/L (ref 1–41)
ANION GAP SERPL CALCULATED.3IONS-SCNC: 9.9 MMOL/L (ref 5–15)
AST SERPL-CCNC: 35 U/L (ref 1–40)
BASOPHILS # BLD AUTO: 0.08 10*3/MM3 (ref 0–0.2)
BASOPHILS NFR BLD AUTO: 1.1 % (ref 0–1.5)
BILIRUB SERPL-MCNC: 0.7 MG/DL (ref 0–1.2)
BILIRUB UR QL STRIP: NEGATIVE
BUN SERPL-MCNC: 15 MG/DL (ref 8–23)
BUN/CREAT SERPL: 12 (ref 7–25)
CALCIUM SPEC-SCNC: 9.7 MG/DL (ref 8.6–10.5)
CHLORIDE SERPL-SCNC: 100 MMOL/L (ref 98–107)
CLARITY UR: CLEAR
CO2 SERPL-SCNC: 29.1 MMOL/L (ref 22–29)
COLOR UR: YELLOW
CREAT SERPL-MCNC: 1.25 MG/DL (ref 0.76–1.27)
CREAT UR-MCNC: 23 MG/DL
DEPRECATED RDW RBC AUTO: 39 FL (ref 37–54)
EOSINOPHIL # BLD AUTO: 0.2 10*3/MM3 (ref 0–0.4)
EOSINOPHIL NFR BLD AUTO: 2.9 % (ref 0.3–6.2)
ERYTHROCYTE [DISTWIDTH] IN BLOOD BY AUTOMATED COUNT: 13 % (ref 12.3–15.4)
GFR SERPL CREATININE-BSD FRML MDRD: 58 ML/MIN/1.73
GLOBULIN UR ELPH-MCNC: 2.8 GM/DL
GLUCOSE SERPL-MCNC: 128 MG/DL (ref 65–99)
GLUCOSE UR STRIP-MCNC: NEGATIVE MG/DL
HBA1C MFR BLD: 6.7 % (ref 3.5–5.6)
HCT VFR BLD AUTO: 50.7 % (ref 37.5–51)
HGB BLD-MCNC: 17.3 G/DL (ref 13–17.7)
HGB UR QL STRIP.AUTO: NEGATIVE
IMM GRANULOCYTES # BLD AUTO: 0.01 10*3/MM3 (ref 0–0.05)
IMM GRANULOCYTES NFR BLD AUTO: 0.1 % (ref 0–0.5)
KETONES UR QL STRIP: NEGATIVE
LEUKOCYTE ESTERASE UR QL STRIP.AUTO: NEGATIVE
LYMPHOCYTES # BLD AUTO: 2.7 10*3/MM3 (ref 0.7–3.1)
LYMPHOCYTES NFR BLD AUTO: 38.6 % (ref 19.6–45.3)
MCH RBC QN AUTO: 28.7 PG (ref 26.6–33)
MCHC RBC AUTO-ENTMCNC: 34.1 G/DL (ref 31.5–35.7)
MCV RBC AUTO: 84.1 FL (ref 79–97)
MONOCYTES # BLD AUTO: 0.6 10*3/MM3 (ref 0.1–0.9)
MONOCYTES NFR BLD AUTO: 8.6 % (ref 5–12)
NEUTROPHILS NFR BLD AUTO: 3.41 10*3/MM3 (ref 1.7–7)
NEUTROPHILS NFR BLD AUTO: 48.7 % (ref 42.7–76)
NITRITE UR QL STRIP: NEGATIVE
NRBC BLD AUTO-RTO: 0 /100 WBC (ref 0–0.2)
PH UR STRIP.AUTO: 7 [PH] (ref 5–8)
PHOSPHATE SERPL-MCNC: 3.6 MG/DL (ref 2.5–4.5)
PLATELET # BLD AUTO: 171 10*3/MM3 (ref 140–450)
PMV BLD AUTO: 11.7 FL (ref 6–12)
POTASSIUM SERPL-SCNC: 4.1 MMOL/L (ref 3.5–5.2)
PROT SERPL-MCNC: 7.5 G/DL (ref 6–8.5)
PROT UR QL STRIP: NEGATIVE
PROT UR-MCNC: <4 MG/DL
PROT/CREAT UR: NORMAL MG/G{CREAT}
PTH-INTACT SERPL-MCNC: 54.3 PG/ML (ref 15–65)
RBC # BLD AUTO: 6.03 10*6/MM3 (ref 4.14–5.8)
SODIUM SERPL-SCNC: 139 MMOL/L (ref 136–145)
SP GR UR STRIP: 1.01 (ref 1–1.03)
UROBILINOGEN UR QL STRIP: NORMAL
WBC # BLD AUTO: 7 10*3/MM3 (ref 3.4–10.8)

## 2020-11-06 PROCEDURE — 83970 ASSAY OF PARATHORMONE: CPT

## 2020-11-06 PROCEDURE — 83036 HEMOGLOBIN GLYCOSYLATED A1C: CPT

## 2020-11-06 PROCEDURE — 82570 ASSAY OF URINE CREATININE: CPT

## 2020-11-06 PROCEDURE — 81003 URINALYSIS AUTO W/O SCOPE: CPT

## 2020-11-06 PROCEDURE — 82306 VITAMIN D 25 HYDROXY: CPT

## 2020-11-06 PROCEDURE — 36415 COLL VENOUS BLD VENIPUNCTURE: CPT

## 2020-11-06 PROCEDURE — 84156 ASSAY OF PROTEIN URINE: CPT

## 2020-11-06 PROCEDURE — 85025 COMPLETE CBC W/AUTO DIFF WBC: CPT

## 2020-11-06 PROCEDURE — 80053 COMPREHEN METABOLIC PANEL: CPT

## 2020-11-06 PROCEDURE — 84100 ASSAY OF PHOSPHORUS: CPT

## 2020-11-24 ENCOUNTER — OFFICE VISIT (OUTPATIENT)
Dept: CARDIOLOGY | Facility: CLINIC | Age: 65
End: 2020-11-24

## 2020-11-24 VITALS
BODY MASS INDEX: 44.1 KG/M2 | WEIGHT: 315 LBS | HEART RATE: 68 BPM | DIASTOLIC BLOOD PRESSURE: 90 MMHG | SYSTOLIC BLOOD PRESSURE: 165 MMHG | HEIGHT: 71 IN | OXYGEN SATURATION: 98 % | TEMPERATURE: 97.5 F

## 2020-11-24 DIAGNOSIS — N18.30 STAGE 3 CHRONIC KIDNEY DISEASE, UNSPECIFIED WHETHER STAGE 3A OR 3B CKD (HCC): ICD-10-CM

## 2020-11-24 DIAGNOSIS — Z79.01 LONG TERM (CURRENT) USE OF ANTICOAGULANTS: ICD-10-CM

## 2020-11-24 DIAGNOSIS — I48.0 PAROXYSMAL ATRIAL FIBRILLATION (HCC): Primary | ICD-10-CM

## 2020-11-24 PROCEDURE — 93000 ELECTROCARDIOGRAM COMPLETE: CPT | Performed by: INTERNAL MEDICINE

## 2020-11-24 PROCEDURE — 99214 OFFICE O/P EST MOD 30 MIN: CPT | Performed by: INTERNAL MEDICINE

## 2020-11-24 NOTE — PROGRESS NOTES
Encounter Date:11/24/2020  Last seen 5/18/2020-telemedicine      Patient ID: Seth Torres is a 65 y.o. male.    Chief Complaint:  Follow-up atrial fibrillation  Coronary artery disease  Anticoagulation management  Renal dysfunction        History of Present Illness  Since I have last seen, the patient has been without any chest discomfort ,shortness of breath, palpitations, dizziness or syncope.  Denies having any headache ,abdominal pain ,nausea, vomiting , diarrhea constipation, loss of weight or loss of appetite.  Denies having any excessive bruising ,hematuria or blood in the stool.    Review of all systems negative except as indicated.    Reviewed ROS.    Assessment and Plan      ]]]]]]]]]]]]]]]]]]]]   impression  ==========   -history of atrial fibrillation -converted and maintaining sinus rhythm.   Echocardiogram showed normal left ventricular function without any pericardial effusion.  June 2018.      Patient had pneumonia and febrile illness associated with atrial fibrillatio  -History of pneumonia-left lower lobe seen on chest x-ray as well of conformed by CT scan.      -stable angina pectoris   mild coronary artery disease.  Cardiac catheterization 2012 revealed 50-60% lad disease     -anticoagulation -on Eliquis as long-term treatment for DVT      -Renal dysfunction bun35 cr 2.4 hypertension dyslipidemia      - history of DVT Status post thrombectomy.  Status post IVC filter placement     -allergic to bee venom lopid  =======  Plan  ==========  EKG showed sinus rhythm without any ischemic changes.  Patient is not having any angina pectoris or congestive heart failure.  Medications were reviewed and updated.  patient is off amiodarone  Continue Cardizem and Eliquis  Followup in the office in  6 months with EKG  Continue Eliquis as long-term therapy for DVT etc  Further plan will depend on patient's progress.  [[[[[[[[[[[[[[[[[[                      Diagnosis Plan   1. Paroxysmal atrial fibrillation  (CMS/[facility])     2. Long term (current) use of anticoagulants     3. Stage 3 chronic kidney disease, unspecified whether stage 3a or 3b CKD     LAB RESULTS (LAST 7 DAYS)    CBC        BMP        CMP         BNP        TROPONIN        CoAg        Creatinine Clearance  Estimated Creatinine Clearance: 88.7 mL/min (by C-G formula based on SCr of 1.25 mg/dL).    ABG        Radiology  No radiology results for the last day                The following portions of the patient's history were reviewed and updated as appropriate: allergies, current medications, past family history, past medical history, past social history, past surgical history and problem list.    Review of Systems   Constitution: Negative for malaise/fatigue.   Cardiovascular: Negative for chest pain, leg swelling, palpitations and syncope.   Respiratory: Negative for shortness of breath.    Skin: Negative for rash.   Gastrointestinal: Negative for nausea and vomiting.   Neurological: Negative for dizziness, light-headedness and numbness.         Current Outpatient Medications:   •  apixaban (Eliquis) 5 MG tablet tablet, Take 2.5 mg by mouth Every 12 (Twelve) Hours., Disp: , Rfl:   •  diltiazem XR (DILACOR XR) 240 MG 24 hr capsule, 1 capsule Daily., Disp: , Rfl:   •  furosemide (LASIX) 40 MG tablet, FUROSEMIDE 40 MG TABS, Disp: , Rfl:   •  hydrALAZINE (APRESOLINE) 25 MG tablet, Take 25 mg by mouth 2 (Two) Times a Day., Disp: , Rfl:   •  isosorbide mononitrate (IMDUR) 30 MG 24 hr tablet, Daily., Disp: , Rfl:   •  Magnesium Oxide -Mg Supplement 400 MG capsule, MAGNESIUM 400 MG CAPS, Disp: , Rfl:   •  metFORMIN (FORTAMET) 500 MG (OSM) 24 hr tablet, Daily., Disp: , Rfl:   •  omeprazole (priLOSEC) 40 MG capsule, Take 40 mg by mouth Daily., Disp: , Rfl:   •  potassium chloride (KLOR-CON) 20 MEQ packet, Take 20 mEq by mouth Daily., Disp: , Rfl:   •  pravastatin (PRAVACHOL) 80 MG tablet, Daily., Disp: , Rfl:     Allergies   Allergen Reactions   • Bee Venom Anaphylaxis    • Gemfibrozil Hives       Family History   Problem Relation Age of Onset   • Lymphoma Brother 42        Non Hodgkins lymphoma   • Stroke Brother    • Heart attack Other         Extensive MI History on Paternal side       Past Surgical History:   Procedure Laterality Date   • CYSTOSCOPY URETEROSCOPY Left 12/10/2018    Dr. Ty   • HAND SURGERY Left     Ganglion cyst removed palm of left hand   • HAND SURGERY  08/21/2019   • REPLACEMENT TOTAL KNEE Bilateral     one in 2001 and the other in 2002   • SEPTOPLASTY      deviated septum       Past Medical History:   Diagnosis Date   • CAD (coronary artery disease) 1997   • CKD (chronic kidney disease), stage III 06/2018    Dr. West   • Diverticulosis 10/2016   • DJD (degenerative joint disease)    • DVT, lower extremity, recurrent, left (CMS/HCC) 02/2017   • Elevated factor VIII level 2018   • Hyperlipidemia 1997   • Hypertension 1997   • Pneumonia 2018 January 2018 and June 2018   • Renal calculi 09/2016   • Sleep apnea 2018    Dr. Grier       Family History   Problem Relation Age of Onset   • Lymphoma Brother 42        Non Hodgkins lymphoma   • Stroke Brother    • Heart attack Other         Extensive MI History on Paternal side       Social History     Socioeconomic History   • Marital status:      Spouse name: Not on file   • Number of children: Not on file   • Years of education: Not on file   • Highest education level: Not on file   Tobacco Use   • Smoking status: Former Smoker     Packs/day: 1.50     Years: 2.00     Pack years: 3.00     Types: Cigarettes   • Smokeless tobacco: Never Used   • Tobacco comment: smoked one and a half packs of cigarettes a day for two years quitting at age 36   Substance and Sexual Activity   • Alcohol use: Not Currently     Alcohol/week: 2.0 standard drinks     Types: 2 Cans of beer per week     Comment: drinks two beers monthly   • Drug use: No           ECG 12 Lead    Date/Time: 11/24/2020 9:42 AM  Performed by: Brenda  "MD Divya  Authorized by: Divya Gutierrez MD   Comparison: compared with previous ECG   Similar to previous ECG  Comparison to previous ECG: Normal sinus rhythm normal ECG normal axis normal intervals 65/min no ectopy no significant change from 11/21/2019                Objective:       Physical Exam    /90   Pulse 68   Temp 97.5 °F (36.4 °C)   Ht 180.3 cm (71\")   Wt (!) 149 kg (328 lb)   SpO2 98%   BMI 45.75 kg/m²   The patient is alert, oriented and in no distress.    Vital signs as noted above.    Head and neck revealed no carotid bruits or jugular venous distension.  No thyromegaly or lymphadenopathy is present.    Lungs clear.  No wheezing.  Breath sounds are normal bilaterally.    Heart normal first and second heart sounds.  No murmur..  No pericardial rub is present.  No gallop is present.    Abdomen soft and nontender.  No organomegaly is present.    Extremities revealed good peripheral pulses without any pedal edema.    Skin warm and dry.    Musculoskeletal system is grossly normal.    CNS grossly normal.    Reviewed and unchanged from last visit.        "

## 2021-05-25 ENCOUNTER — LAB (OUTPATIENT)
Dept: LAB | Facility: HOSPITAL | Age: 66
End: 2021-05-25

## 2021-05-25 ENCOUNTER — TRANSCRIBE ORDERS (OUTPATIENT)
Dept: LAB | Facility: HOSPITAL | Age: 66
End: 2021-05-25

## 2021-05-25 DIAGNOSIS — N18.30 STAGE 3 CHRONIC KIDNEY DISEASE, UNSPECIFIED WHETHER STAGE 3A OR 3B CKD (HCC): Primary | ICD-10-CM

## 2021-05-25 DIAGNOSIS — N18.30 STAGE 3 CHRONIC KIDNEY DISEASE, UNSPECIFIED WHETHER STAGE 3A OR 3B CKD (HCC): ICD-10-CM

## 2021-05-25 LAB
ALBUMIN SERPL-MCNC: 4.2 G/DL (ref 3.5–5.2)
ALBUMIN/GLOB SERPL: 1.6 G/DL
ALP SERPL-CCNC: 99 U/L (ref 39–117)
ALT SERPL W P-5'-P-CCNC: 46 U/L (ref 1–41)
ANION GAP SERPL CALCULATED.3IONS-SCNC: 12.2 MMOL/L (ref 5–15)
AST SERPL-CCNC: 26 U/L (ref 1–40)
BASOPHILS # BLD AUTO: 0.05 10*3/MM3 (ref 0–0.2)
BASOPHILS NFR BLD AUTO: 0.8 % (ref 0–1.5)
BILIRUB SERPL-MCNC: 0.4 MG/DL (ref 0–1.2)
BILIRUB UR QL STRIP: NEGATIVE
BUN SERPL-MCNC: 16 MG/DL (ref 8–23)
BUN/CREAT SERPL: 12.6 (ref 7–25)
CALCIUM SPEC-SCNC: 9 MG/DL (ref 8.6–10.5)
CHLORIDE SERPL-SCNC: 102 MMOL/L (ref 98–107)
CLARITY UR: CLEAR
CO2 SERPL-SCNC: 25.8 MMOL/L (ref 22–29)
COLOR UR: YELLOW
CREAT SERPL-MCNC: 1.27 MG/DL (ref 0.76–1.27)
CREAT UR-MCNC: 33.9 MG/DL
DEPRECATED RDW RBC AUTO: 42.3 FL (ref 37–54)
EOSINOPHIL # BLD AUTO: 0.14 10*3/MM3 (ref 0–0.4)
EOSINOPHIL NFR BLD AUTO: 2.1 % (ref 0.3–6.2)
ERYTHROCYTE [DISTWIDTH] IN BLOOD BY AUTOMATED COUNT: 13.3 % (ref 12.3–15.4)
GFR SERPL CREATININE-BSD FRML MDRD: 57 ML/MIN/1.73
GLOBULIN UR ELPH-MCNC: 2.7 GM/DL
GLUCOSE SERPL-MCNC: 155 MG/DL (ref 65–99)
GLUCOSE UR STRIP-MCNC: NEGATIVE MG/DL
HCT VFR BLD AUTO: 51.5 % (ref 37.5–51)
HGB BLD-MCNC: 17.1 G/DL (ref 13–17.7)
HGB UR QL STRIP.AUTO: NEGATIVE
IMM GRANULOCYTES # BLD AUTO: 0.01 10*3/MM3 (ref 0–0.05)
IMM GRANULOCYTES NFR BLD AUTO: 0.2 % (ref 0–0.5)
KETONES UR QL STRIP: NEGATIVE
LEUKOCYTE ESTERASE UR QL STRIP.AUTO: NEGATIVE
LYMPHOCYTES # BLD AUTO: 2.39 10*3/MM3 (ref 0.7–3.1)
LYMPHOCYTES NFR BLD AUTO: 35.9 % (ref 19.6–45.3)
MAGNESIUM SERPL-MCNC: 2 MG/DL (ref 1.6–2.4)
MCH RBC QN AUTO: 29 PG (ref 26.6–33)
MCHC RBC AUTO-ENTMCNC: 33.2 G/DL (ref 31.5–35.7)
MCV RBC AUTO: 87.3 FL (ref 79–97)
MONOCYTES # BLD AUTO: 0.61 10*3/MM3 (ref 0.1–0.9)
MONOCYTES NFR BLD AUTO: 9.2 % (ref 5–12)
NEUTROPHILS NFR BLD AUTO: 3.45 10*3/MM3 (ref 1.7–7)
NEUTROPHILS NFR BLD AUTO: 51.8 % (ref 42.7–76)
NITRITE UR QL STRIP: NEGATIVE
NRBC BLD AUTO-RTO: 0 /100 WBC (ref 0–0.2)
PH UR STRIP.AUTO: 7 [PH] (ref 5–8)
PLATELET # BLD AUTO: 167 10*3/MM3 (ref 140–450)
PMV BLD AUTO: 11.7 FL (ref 6–12)
POTASSIUM SERPL-SCNC: 4.1 MMOL/L (ref 3.5–5.2)
PROT SERPL-MCNC: 6.9 G/DL (ref 6–8.5)
PROT UR QL STRIP: NEGATIVE
PROT UR-MCNC: <4 MG/DL
PROT/CREAT UR: NORMAL MG/G{CREAT}
PTH-INTACT SERPL-MCNC: 68 PG/ML (ref 15–65)
RBC # BLD AUTO: 5.9 10*6/MM3 (ref 4.14–5.8)
SODIUM SERPL-SCNC: 140 MMOL/L (ref 136–145)
SP GR UR STRIP: 1.01 (ref 1–1.03)
TSH SERPL DL<=0.05 MIU/L-ACNC: 2.64 UIU/ML (ref 0.27–4.2)
URATE SERPL-MCNC: 8.2 MG/DL (ref 3.4–7)
UROBILINOGEN UR QL STRIP: NORMAL
WBC # BLD AUTO: 6.65 10*3/MM3 (ref 3.4–10.8)

## 2021-05-25 PROCEDURE — 85025 COMPLETE CBC W/AUTO DIFF WBC: CPT

## 2021-05-25 PROCEDURE — 84443 ASSAY THYROID STIM HORMONE: CPT

## 2021-05-25 PROCEDURE — 82570 ASSAY OF URINE CREATININE: CPT

## 2021-05-25 PROCEDURE — 84550 ASSAY OF BLOOD/URIC ACID: CPT

## 2021-05-25 PROCEDURE — 81003 URINALYSIS AUTO W/O SCOPE: CPT

## 2021-05-25 PROCEDURE — 80053 COMPREHEN METABOLIC PANEL: CPT

## 2021-05-25 PROCEDURE — 36415 COLL VENOUS BLD VENIPUNCTURE: CPT

## 2021-05-25 PROCEDURE — 83735 ASSAY OF MAGNESIUM: CPT

## 2021-05-25 PROCEDURE — 83970 ASSAY OF PARATHORMONE: CPT

## 2021-05-25 PROCEDURE — 84156 ASSAY OF PROTEIN URINE: CPT

## 2021-06-28 ENCOUNTER — TRANSCRIBE ORDERS (OUTPATIENT)
Dept: PHYSICAL THERAPY | Facility: CLINIC | Age: 66
End: 2021-06-28

## 2021-06-28 DIAGNOSIS — M48.02 CERVICAL STENOSIS OF SPINE: Primary | ICD-10-CM

## 2021-07-13 ENCOUNTER — TREATMENT (OUTPATIENT)
Dept: PHYSICAL THERAPY | Facility: CLINIC | Age: 66
End: 2021-07-13

## 2021-07-13 DIAGNOSIS — M48.02 STENOSIS OF CERVICAL SPINE: Primary | ICD-10-CM

## 2021-07-13 PROCEDURE — 97161 PT EVAL LOW COMPLEX 20 MIN: CPT | Performed by: PHYSICAL THERAPIST

## 2021-07-13 PROCEDURE — 97140 MANUAL THERAPY 1/> REGIONS: CPT | Performed by: PHYSICAL THERAPIST

## 2021-07-13 PROCEDURE — 97112 NEUROMUSCULAR REEDUCATION: CPT | Performed by: PHYSICAL THERAPIST

## 2021-07-13 NOTE — PROGRESS NOTES
Physical Therapy Initial Evaluation and Plan of Care    Patient: Seth Torres   : 1955  Diagnosis/ICD-10 Code:  Stenosis of cervical spine [M48.02]  Referring practitioner: Juan Manuel Rushing MD  Date of Initial Visit: 2021  Today's Date: 2021  Patient seen for 1 sessions           Subjective Questionnaire: NDI 38%      Subjective 65 yo male with c/o cspine and R UE pain. Pt with insidious onset of symptoms ~7 months ago. Pt with hx C5,6,7 fusion ~15 years ago. Primary pain is along the CT junction area, R upper trap area, and into the lateral UE to just proximal to the elbow. Symptoms have worsened with time. Pt with episodes of numbness and tingling along the lateral R UE to as far as his hand. 6/10 pain now and 9/10 at worst. Symptoms worsen with L SL, reaching, lifting. Symptoms improve with massage, heat.  MD follow up: 21  Social hx: Retired      Objective          Tenderness     Additional Tenderness Details  Tender along C3 and C4 spinous processes.    Active Range of Motion   Cervical/Thoracic Spine   Cervical    Flexion: 24 degrees   Extension: 28 degrees   Left lateral flexion: 25 degrees   Right lateral flexion: 13 degrees with pain  Left rotation: 55 degrees   Right rotation: 32 degrees with pain    Additional Active Range of Motion Details  Peripheral pain with right sidebending and rotation  UEs wfl    Strength/Myotome Testing   Cervical Spine     Left   Normal strength    Right   Neck lateral flexion (C3): 4-    Right Shoulder     Planes of Motion   Flexion: 4   Abduction: 4-   External rotation at 0°: 4+     Right Elbow   Flexion: 4+  Extension: 4+    Right Wrist/Hand   Wrist extension: 5  Wrist flexion: 5    Tests   Cervical     Right   Positive cervical distraction and Spurling's sign.           Assessment & Plan     Assessment  Impairments: abnormal or restricted ROM, activity intolerance, impaired physical strength, lacks appropriate home exercise program and pain  with function  Assessment details: 67 yo male with c/o cspine and R UE pain. Pt is with a good response to treatment this date and would benefit from further evaluation and treatment to address the above impairments.    Prognosis: good  Functional Limitations: carrying objects, lifting, sleeping, pulling, pushing, uncomfortable because of pain, standing, reaching behind back, reaching overhead and unable to perform repetitive tasks  Goals  Plan Goals: Short term goals, 1 week: Tolerate HEP progression.  Voice compliance with activity modification.  Report improvement in symptoms.    LTGs, 4 weeks: Improve NDI to 20%.  Cspine AROM to approach wfl and non provocative of UE pain.  Symptoms to be centralized.  Independent with HEP.    Plan  Therapy options: will be seen for skilled physical therapy services  Other planned modality interventions: modalities prn  Planned therapy interventions: flexibility, body mechanics training, functional ROM exercises, home exercise program, manual therapy, neuromuscular re-education, postural training, strengthening, stretching and therapeutic activities  Frequency: 2-3 times per week.  Duration in weeks: 4  Treatment plan discussed with: patient  Plan details: 30 visits per POC, 90 day certification period      Timed:         Manual Therapy:    15     mins  74701;     Therapeutic Exercise:      mins  61473;     Neuromuscular Janeth:   15     mins  86777;    Therapeutic Activity:          mins  37234;     Gait Training:           mins  71618;     Ultrasound:          mins  55162;    Ionto                                   mins   26652  Self Care                            mins   73141    Un-Timed:  Electrical Stimulation:         mins  47139 ( );  Traction          mins 64529  Low Eval     15     Mins  10045  Mod Eval          Mins  83359  High Eval                            Mins  30056  Re-Eval                               mins  39924        Timed Treatment:   30   mins    Total Treatment:     45   mins    PT SIGNATURE: Ayden Magaña, PT, DPT, OCS  [place] license: [id number]  DATE TREATMENT INITIATED: 7/13/2021    Initial Certification  Certification Period: 10/11/2021  I certify that the therapy services are furnished while this patient is under my care.  The services outlined above are required for this patient and will be reviewed every 90 days.     PHYSICIAN: _____________________________    Juan Manuel Rushing MD      DATE:     Please sign and return via fax to [phone]. Thank you, [facility].

## 2021-07-15 ENCOUNTER — TREATMENT (OUTPATIENT)
Dept: PHYSICAL THERAPY | Facility: CLINIC | Age: 66
End: 2021-07-15

## 2021-07-15 DIAGNOSIS — M48.02 STENOSIS OF CERVICAL SPINE: Primary | ICD-10-CM

## 2021-07-15 PROCEDURE — 97140 MANUAL THERAPY 1/> REGIONS: CPT | Performed by: PHYSICAL THERAPIST

## 2021-07-15 PROCEDURE — 97112 NEUROMUSCULAR REEDUCATION: CPT | Performed by: PHYSICAL THERAPIST

## 2021-07-15 NOTE — PROGRESS NOTES
Physical Therapy Daily Progress Note  Seth Torres   1955   Primary Diagnosis: Stenosis of cervical spine [M48.02]   Type: THERAPY  Noted: 7/13/2021   7/15/2021   Visits: 2       Subjective   Pt reports: UE symptoms improving. Cspine pain this AM. HEP going well.      Objective     See Exercise, Manual, and Modality Logs for complete treatment.     Patient Education: HEP    Assessment/Plan Good response to rx.      Progress per Plan of Care            Timed:         Manual Therapy:    15     mins  43990;     Therapeutic Exercise:         mins  58028;     Neuromuscular Janeth:    15    mins  19876;    Therapeutic Activity:          mins  45194;     Gait Training:           mins  19192;     Ultrasound:          mins  38710;    Ionto                                   mins   19262  Self Care                            mins   16945    Un-Timed:  Electrical Stimulation:         mins  06325 ( );  Traction          mins 20181  Low Eval          Mins  50292  Mod Eval          Mins  13957  High Eval                            Mins  73665  Re-Eval                               mins  64457    Timed Treatment:   30   mins   Total Treatment:     30   mins    Ayden Magaña, PT, DPT, OCS  [place] license: [id number]  Physical Therapist

## 2021-07-22 ENCOUNTER — TREATMENT (OUTPATIENT)
Dept: PHYSICAL THERAPY | Facility: CLINIC | Age: 66
End: 2021-07-22

## 2021-07-22 DIAGNOSIS — M48.02 STENOSIS OF CERVICAL SPINE: Primary | ICD-10-CM

## 2021-07-22 PROCEDURE — 97112 NEUROMUSCULAR REEDUCATION: CPT | Performed by: PHYSICAL THERAPIST

## 2021-07-22 PROCEDURE — 97012 MECHANICAL TRACTION THERAPY: CPT | Performed by: PHYSICAL THERAPIST

## 2021-07-22 NOTE — PROGRESS NOTES
Physical Therapy Daily Progress Note  Seth Torres   1955   Primary Diagnosis: Stenosis of cervical spine [M48.02]   Type: THERAPY  Noted: 7/13/2021 7/22/2021   Visits: 3       Subjective   Pt reports: Pt with a new referral to evaluate for a home traction unit. Reports generally improving re: pain symptoms and HEP is going well.      Objective     See Exercise, Manual, and Modality Logs for complete treatment.     Patient Education: HEP, home traction    Assessment/Plan Pt with a good response to mechanical traction this date, including centralization of his symptoms. He would benefit from a pneumatic home traction unit to help mitigate and manage his symptoms.      Progress per Plan of Care            Timed:         Manual Therapy:         mins  63409;     Therapeutic Exercise:         mins  59396;     Neuromuscular Janeth:    8    mins  26365;    Therapeutic Activity:          mins  49281;     Gait Training:           mins  32616;     Ultrasound:          mins  75920;    Ionto                                   mins   72189  Self Care                            mins   15094    Un-Timed:  Electrical Stimulation:         mins  03209 ( );  Traction     15     mins 19016  Low Eval          Mins  70803  Mod Eval          Mins  10277  High Eval                            Mins  77363  Re-Eval                               mins  24820    Timed Treatment:   8   mins   Total Treatment:     13   mins    Ayden Magaña, PT, DPT, OCS  [place] license: [id number]  Physical Therapist

## 2021-07-26 ENCOUNTER — OFFICE VISIT (OUTPATIENT)
Dept: CARDIOLOGY | Facility: CLINIC | Age: 66
End: 2021-07-26

## 2021-07-26 VITALS
BODY MASS INDEX: 44.1 KG/M2 | SYSTOLIC BLOOD PRESSURE: 158 MMHG | DIASTOLIC BLOOD PRESSURE: 86 MMHG | WEIGHT: 315 LBS | OXYGEN SATURATION: 98 % | HEART RATE: 70 BPM | HEIGHT: 71 IN

## 2021-07-26 DIAGNOSIS — N18.30 STAGE 3 CHRONIC KIDNEY DISEASE, UNSPECIFIED WHETHER STAGE 3A OR 3B CKD (HCC): ICD-10-CM

## 2021-07-26 DIAGNOSIS — I82.402 RECURRENT DEEP VEIN THROMBOSIS (DVT) OF LEFT LOWER EXTREMITY (HCC): ICD-10-CM

## 2021-07-26 DIAGNOSIS — I48.0 PAROXYSMAL ATRIAL FIBRILLATION (HCC): Primary | ICD-10-CM

## 2021-07-26 PROCEDURE — 93000 ELECTROCARDIOGRAM COMPLETE: CPT | Performed by: INTERNAL MEDICINE

## 2021-07-26 PROCEDURE — 99214 OFFICE O/P EST MOD 30 MIN: CPT | Performed by: INTERNAL MEDICINE

## 2021-07-26 RX ORDER — ALLOPURINOL 100 MG/1
1 TABLET ORAL DAILY
COMMUNITY
Start: 2021-06-08

## 2021-07-26 RX ORDER — ISOSORBIDE MONONITRATE 20 MG/1
20 TABLET ORAL NIGHTLY
COMMUNITY
End: 2023-02-21 | Stop reason: DRUGHIGH

## 2021-07-26 RX ORDER — MELATONIN
1000 DAILY
COMMUNITY

## 2021-07-26 RX ORDER — TERBINAFINE HYDROCHLORIDE 250 MG/1
250 TABLET ORAL DAILY
COMMUNITY
End: 2023-02-21

## 2021-07-26 NOTE — PROGRESS NOTES
Encounter Date:07/26/2021    Last seen 11/24/2020      Patient ID: Seth Torres is a 66 y.o. male.    Chief Complaint:    Follow-up  Atrial fibrillation  Coronary artery disease  Anticoagulation management  Renal dysfunction        History of Present Illness  Since I have last seen, the patient has been without any chest discomfort ,shortness of breath, palpitations, dizziness or syncope.  Denies having any headache ,abdominal pain ,nausea, vomiting , diarrhea constipation, loss of weight or loss of appetite.  Denies having any excessive bruising ,hematuria or blood in the stool.    Review of all systems negative except as indicated.    Reviewed ROS.  Assessment and Plan      ]]]]]]]]]]]]]]]]]]]]   impression  ==========   -history of atrial fibrillation -converted and maintaining sinus rhythm.   Echocardiogram showed normal left ventricular function without any pericardial effusion.  June 2018.      Patient had pneumonia and febrile illness associated with atrial fibrillatio  -History of pneumonia-left lower lobe seen on chest x-ray as well of conformed by CT scan.      -stable angina pectoris   mild coronary artery disease.  Cardiac catheterization 2012 revealed 50-60% lad disease     -anticoagulation -on Eliquis as long-term treatment for DVT      -Renal dysfunction bun35 cr 2.4 hypertension dyslipidemia      - history of DVT Status post thrombectomy.  Status post IVC filter placement    -Exogenous obesity     -allergic to bee venom lopid  =======  Plan  ==========  History of atrial fibrillation  Patient has converted and maintaining sinus rhythm.  EKG showed sinus rhythm without any ischemic changes.    Chronic coronary artery disease  Patient is not having any angina pectoris or congestive heart failure.    Anticoagulation-on Eliquis.  Observe for toxic effects.    Hypertension-158/86.    Dyslipidemia-continue pravastatin    Medications were reviewed and updated.  patient is off amiodarone  Continue  Cardizem and Eliquis  Followup in the office in  6 months with EKG  Continue Eliquis as long-term therapy for DVT etc  Further plan will depend on patient's progress.  [[[[[[[[[[[[[[[[[[                      Diagnosis Plan   1. Paroxysmal atrial fibrillation (CMS/HCC)  ECG 12 Lead   2. Stage 3 chronic kidney disease, unspecified whether stage 3a or 3b CKD (CMS/HCC)  ECG 12 Lead   3. Recurrent deep vein thrombosis (DVT) of left lower extremity (CMS/HCC)  ECG 12 Lead   LAB RESULTS (LAST 7 DAYS)    CBC        BMP        CMP         BNP        TROPONIN        CoAg        Creatinine Clearance  CrCl cannot be calculated (Patient's most recent lab result is older than the maximum 30 days allowed.).    ABG        Radiology  No radiology results for the last day                The following portions of the patient's history were reviewed and updated as appropriate: allergies, current medications, past family history, past medical history, past social history, past surgical history and problem list.    Review of Systems   Constitutional: Negative for fever and malaise/fatigue.   Cardiovascular: Negative for chest pain, dyspnea on exertion and palpitations.   Respiratory: Negative for cough and shortness of breath.    Skin: Negative for rash.   Gastrointestinal: Negative for abdominal pain, nausea and vomiting.   Neurological: Negative for focal weakness and headaches.   All other systems reviewed and are negative.        Current Outpatient Medications:   •  allopurinol (ZYLOPRIM) 100 MG tablet, Take 1 tablet by mouth Daily., Disp: , Rfl:   •  apixaban (Eliquis) 5 MG tablet tablet, Take 2.5 mg by mouth Every 12 (Twelve) Hours., Disp: , Rfl:   •  cholecalciferol (Vitamin D) 25 MCG (1000 UT) tablet, Take 1,000 Units by mouth Daily., Disp: , Rfl:   •  diltiazem XR (DILACOR XR) 240 MG 24 hr capsule, 1 capsule Daily., Disp: , Rfl:   •  furosemide (LASIX) 40 MG tablet, FUROSEMIDE 40 MG TABS, Disp: , Rfl:   •  hydrALAZINE  (APRESOLINE) 25 MG tablet, Take 25 mg by mouth 2 (Two) Times a Day., Disp: , Rfl:   •  isosorbide mononitrate (ISMO,MONOKET) 20 MG tablet, Take 20 mg by mouth Daily., Disp: , Rfl:   •  Magnesium Oxide -Mg Supplement 400 MG capsule, MAGNESIUM 400 MG CAPS, Disp: , Rfl:   •  metFORMIN (FORTAMET) 500 MG (OSM) 24 hr tablet, Daily., Disp: , Rfl:   •  omeprazole (priLOSEC) 40 MG capsule, Take 40 mg by mouth Daily., Disp: , Rfl:   •  potassium chloride (KLOR-CON) 20 MEQ packet, Take 20 mEq by mouth Daily., Disp: , Rfl:   •  pravastatin (PRAVACHOL) 80 MG tablet, Daily., Disp: , Rfl:   •  terbinafine (lamiSIL) 250 MG tablet, Take 250 mg by mouth Daily., Disp: , Rfl:     Allergies   Allergen Reactions   • Bee Venom Anaphylaxis   • Gemfibrozil Hives       Family History   Problem Relation Age of Onset   • Lymphoma Brother 42        Non Hodgkins lymphoma   • Stroke Brother    • Heart attack Other         Extensive MI History on Paternal side   • Clotting disorder Sister    • Heart attack Father    • Heart failure Father    • Heart attack Sister    • Heart attack Brother    • Heart disease Brother    • Hypertension Brother    • Heart attack Paternal Aunt    • Heart attack Paternal Uncle        Past Surgical History:   Procedure Laterality Date   • CARDIAC CATHETERIZATION     • CYSTOSCOPY URETEROSCOPY Left 12/10/2018    Dr. Ty   • HAND SURGERY Left     Ganglion cyst removed palm of left hand   • HAND SURGERY  08/21/2019   • REPLACEMENT TOTAL KNEE Bilateral     one in 2001 and the other in 2002   • SEPTOPLASTY      deviated septum       Past Medical History:   Diagnosis Date   • Allergic    • CAD (coronary artery disease) 1997   • CKD (chronic kidney disease), stage III (CMS/[facility]) 06/2018    Dr. West   • Diabetes mellitus (CMS/[facility])    • Diverticulosis 10/2016   • DJD (degenerative joint disease)    • DVT, lower extremity, recurrent, left (CMS/[facility]) 02/2017   • Elevated factor VIII level 2018   • Hyperlipidemia 1997   •  Hypertension 1997   • Pneumonia 2018 and 2018   • Renal calculi 2016   • Sleep apnea     Dr. Grier   • Urinary tract infection        Family History   Problem Relation Age of Onset   • Lymphoma Brother 42        Non Hodgkins lymphoma   • Stroke Brother    • Heart attack Other         Extensive MI History on Paternal side   • Clotting disorder Sister    • Heart attack Father    • Heart failure Father    • Heart attack Sister    • Heart attack Brother    • Heart disease Brother    • Hypertension Brother    • Heart attack Paternal Aunt    • Heart attack Paternal Uncle        Social History     Socioeconomic History   • Marital status:      Spouse name: Not on file   • Number of children: Not on file   • Years of education: Not on file   • Highest education level: Not on file   Tobacco Use   • Smoking status: Former Smoker     Packs/day: 1.50     Years: 2.00     Pack years: 3.00     Types: Cigarettes     Start date: 1978     Quit date: 1980     Years since quittin.5   • Smokeless tobacco: Never Used   • Tobacco comment: smoked one and a half packs of cigarettes a day for two years quitting at age 36   Vaping Use   • Vaping Use: Never used   Substance and Sexual Activity   • Alcohol use: Not Currently     Alcohol/week: 2.0 standard drinks     Types: 2 Cans of beer per week     Comment: drinks two beers monthly   • Drug use: No   • Sexual activity: Yes     Partners: Female     Birth control/protection: None           ECG 12 Lead    Date/Time: 2021 1:29 PM  Performed by: Divya Gutierrez MD  Authorized by: Divya Gutierrez MD   Comparison: compared with previous ECG   Similar to previous ECG  Comparison to previous ECG: Normal sinus rhythm nonspecific ST-T wave changes 60/min normal axis normal intervals no ectopy no significant change from 2020                Objective:       Physical Exam    /86 (BP Location: Right arm, Patient Position: Sitting, Cuff  "Size: Large Adult)   Pulse 70   Ht 180.3 cm (71\")   Wt (!) 149 kg (328 lb)   SpO2 98%   BMI 45.75 kg/m²   The patient is alert, oriented and in no distress.    Vital signs as noted above.  Exogenous obesity (BMI 46)    Head and neck revealed no carotid bruits or jugular venous distension.  No thyromegaly or lymphadenopathy is present.    Lungs clear.  No wheezing.  Breath sounds are normal bilaterally.    Heart normal first and second heart sounds.  No murmur..  No pericardial rub is present.  No gallop is present.    Abdomen soft and nontender.  No organomegaly is present.    Extremities revealed good peripheral pulses without any pedal edema.    Skin warm and dry.    Musculoskeletal system is grossly normal.    CNS grossly normal.    Reviewed and unchanged from last visit.        "

## 2021-07-28 ENCOUNTER — TREATMENT (OUTPATIENT)
Dept: PHYSICAL THERAPY | Facility: CLINIC | Age: 66
End: 2021-07-28

## 2021-07-28 DIAGNOSIS — M48.02 STENOSIS OF CERVICAL SPINE: Primary | ICD-10-CM

## 2021-07-28 PROCEDURE — 97140 MANUAL THERAPY 1/> REGIONS: CPT | Performed by: PHYSICAL THERAPIST

## 2021-07-28 PROCEDURE — 97012 MECHANICAL TRACTION THERAPY: CPT | Performed by: PHYSICAL THERAPIST

## 2021-07-28 PROCEDURE — 97112 NEUROMUSCULAR REEDUCATION: CPT | Performed by: PHYSICAL THERAPIST

## 2021-07-28 NOTE — PROGRESS NOTES
Physical Therapy Daily Progress Note  Seth Torres   1955   Primary Diagnosis: Stenosis of cervical spine [M48.02]   Type: THERAPY  Noted: 7/13/2021 7/28/2021   Visits: 4       Subjective   Pt reports: Pain symptoms continuing to improve. Still with episodes of elbow/forearm symptoms but improving. HEP going well. Going to order home traction this week.      Objective     See Exercise, Manual, and Modality Logs for complete treatment.     Patient Education: HEP    Assessment/Plan  Symptoms centralizing. ROM improved with right rotation post mobilization.      Progress per Plan of Care            Timed:         Manual Therapy:    8     mins  31206;     Therapeutic Exercise:         mins  56244;     Neuromuscular Janeth:    15    mins  65393;    Therapeutic Activity:          mins  51486;     Gait Training:           mins  10153;     Ultrasound:          mins  15321;    Ionto                                   mins   94714  Self Care                            mins   66317    Un-Timed:  Electrical Stimulation:         mins  92495 ( );  Traction     15     mins 85988  Low Eval          Mins  14739  Mod Eval          Mins  14755  High Eval                            Mins  78784  Re-Eval                               mins  03291    Timed Treatment:   23   mins   Total Treatment:     38   mins    Ayden Magaña, PT, DPT, OCS  [place] license: [id number]  Physical Therapist

## 2021-07-29 ENCOUNTER — TREATMENT (OUTPATIENT)
Dept: PHYSICAL THERAPY | Facility: CLINIC | Age: 66
End: 2021-07-29

## 2021-07-29 DIAGNOSIS — M48.02 STENOSIS OF CERVICAL SPINE: Primary | ICD-10-CM

## 2021-07-29 PROCEDURE — 97112 NEUROMUSCULAR REEDUCATION: CPT | Performed by: PHYSICAL THERAPIST

## 2021-07-29 PROCEDURE — 97140 MANUAL THERAPY 1/> REGIONS: CPT | Performed by: PHYSICAL THERAPIST

## 2021-07-29 PROCEDURE — 97012 MECHANICAL TRACTION THERAPY: CPT | Performed by: PHYSICAL THERAPIST

## 2021-07-29 NOTE — PROGRESS NOTES
Physical Therapy Daily Progress Note  Seth Torres   1955   Primary Diagnosis: Stenosis of cervical spine [M48.02]   Type: THERAPY  Noted: 7/13/2021 7/29/2021   Visits: 5       Subjective   Pt reports: Pain symptoms and ROM improving.      Objective     See Exercise, Manual, and Modality Logs for complete treatment.     Patient Education: HEP    Assessment/Plan Progressing well. Symptoms centralizing.      Progress per Plan of Care            Timed:         Manual Therapy:    15     mins  86320;     Therapeutic Exercise:         mins  74834;     Neuromuscular Janeth:    8    mins  28146;    Therapeutic Activity:          mins  83140;     Gait Training:           mins  66717;     Ultrasound:          mins  78706;    Ionto                                   mins   70187  Self Care                            mins   31079    Un-Timed:  Electrical Stimulation:         mins  17122 ( );  Traction     15     mins 29780  Low Eval          Mins  89759  Mod Eval          Mins  96039  High Eval                            Mins  77431  Re-Eval                               mins  93881    Timed Treatment:   23   mins   Total Treatment:     38   mins    Ayden Magaña, PT, DPT, OCS  [place] license: [id number]  Physical Therapist

## 2021-08-03 ENCOUNTER — TREATMENT (OUTPATIENT)
Dept: PHYSICAL THERAPY | Facility: CLINIC | Age: 66
End: 2021-08-03

## 2021-08-03 DIAGNOSIS — M48.02 STENOSIS OF CERVICAL SPINE: Primary | ICD-10-CM

## 2021-08-03 PROCEDURE — 97112 NEUROMUSCULAR REEDUCATION: CPT | Performed by: PHYSICAL THERAPIST

## 2021-08-03 PROCEDURE — 97140 MANUAL THERAPY 1/> REGIONS: CPT | Performed by: PHYSICAL THERAPIST

## 2021-08-03 PROCEDURE — 97012 MECHANICAL TRACTION THERAPY: CPT | Performed by: PHYSICAL THERAPIST

## 2021-08-03 NOTE — PROGRESS NOTES
Physical Therapy Daily Progress Note  Seth Torres   1955   Primary Diagnosis: Stenosis of cervical spine [M48.02]   Type: THERAPY  Noted: 7/13/2021   8/3/2021   Visits: 6       Subjective   Pt reports: Some shoulder pain today, frequency of more distal symptoms improving. HEP going well.      Objective     See Exercise, Manual, and Modality Logs for complete treatment.     Patient Education: HEP    Assessment/Plan Symptoms continue to centralize with rx.      Progress per Plan of Care            Timed:         Manual Therapy:    15     mins  50815;     Therapeutic Exercise:         mins  55239;     Neuromuscular Janeth:    8    mins  77551;    Therapeutic Activity:          mins  55858;     Gait Training:           mins  14517;     Ultrasound:          mins  00647;    Ionto                                   mins   60758  Self Care                            mins   33809    Un-Timed:  Electrical Stimulation:         mins  69585 ( );  Traction     15     mins 95017  Low Eval          Mins  89242  Mod Eval          Mins  21892  High Eval                            Mins  43371  Re-Eval                               mins  41118    Timed Treatment:   23   mins   Total Treatment:     38   mins    Ayden Magaña, PT, DPT, OCS  [place] license: [id number]  Physical Therapist

## 2021-08-04 ENCOUNTER — TREATMENT (OUTPATIENT)
Dept: PHYSICAL THERAPY | Facility: CLINIC | Age: 66
End: 2021-08-04

## 2021-08-04 DIAGNOSIS — M48.02 STENOSIS OF CERVICAL SPINE: Primary | ICD-10-CM

## 2021-08-04 PROCEDURE — 97012 MECHANICAL TRACTION THERAPY: CPT | Performed by: PHYSICAL THERAPIST

## 2021-08-04 PROCEDURE — 97140 MANUAL THERAPY 1/> REGIONS: CPT | Performed by: PHYSICAL THERAPIST

## 2021-08-04 PROCEDURE — 97110 THERAPEUTIC EXERCISES: CPT | Performed by: PHYSICAL THERAPIST

## 2021-08-04 NOTE — PROGRESS NOTES
Physical Therapy Daily Progress Note      Patient: Seth Torres   : 1955  Diagnosis/ICD-10 Code:  Stenosis of cervical spine [M48.02]  Referring practitioner: Juan Manuel Rushing MD  Date of Initial Visit: Type: THERAPY  Noted: 2021  Today's Date: 2021  Patient seen for 7 sessions             Subjective No significant changes since yesterday's treatment.    Objective   See Exercise, Manual, and Modality Logs for complete treatment.       Assessment/Plan  Good tolerance with treatment today.    Progress per Plan of Care           Timed:         Manual Therapy:    15     mins  61166;        Neuromuscular Janeth:    8    mins  87305;        Un-Timed:  Traction     15    mins 74158      Timed Treatment:   23   mins   Total Treatment:     38   mins    Juan Manuel Chan PTA  Physical Therapist Assistant

## 2021-08-06 ENCOUNTER — TREATMENT (OUTPATIENT)
Dept: PHYSICAL THERAPY | Facility: CLINIC | Age: 66
End: 2021-08-06

## 2021-08-06 DIAGNOSIS — M48.02 STENOSIS OF CERVICAL SPINE: Primary | ICD-10-CM

## 2021-08-06 PROCEDURE — 97112 NEUROMUSCULAR REEDUCATION: CPT | Performed by: PHYSICAL THERAPIST

## 2021-08-06 PROCEDURE — 97012 MECHANICAL TRACTION THERAPY: CPT | Performed by: PHYSICAL THERAPIST

## 2021-08-06 NOTE — PROGRESS NOTES
Physical Therapy Daily Progress Note  Seth Torres   1955   Primary Diagnosis: Stenosis of cervical spine [M48.02]   Type: THERAPY  Noted: 7/13/2021 8/6/2021   Visits: 8       Subjective   Pt reports: Symptoms improving. HEP going well.      Objective     See Exercise, Manual, and Modality Logs for complete treatment.     Patient Education: HEP    Assessment/Plan Progressing well.      Progress per Plan of Care            Timed:         Manual Therapy:    8     mins  74473;     Therapeutic Exercise:         mins  12072;     Neuromuscular Janeth:    8    mins  39303;    Therapeutic Activity:          mins  19208;     Gait Training:           mins  29085;     Ultrasound:          mins  98313;    Ionto                                   mins   64101  Self Care                            mins   88195    Un-Timed:  Electrical Stimulation:         mins  84768 ( );  Traction     15     mins 30874  Low Eval          Mins  23843  Mod Eval          Mins  08059  High Eval                            Mins  72498  Re-Eval                               mins  23866    Timed Treatment:   16   mins   Total Treatment:     31   mins    Ayden Magaña, PT, DPT, OCS  [place] license: [id number]  Physical Therapist

## 2021-08-10 ENCOUNTER — TREATMENT (OUTPATIENT)
Dept: PHYSICAL THERAPY | Facility: CLINIC | Age: 66
End: 2021-08-10

## 2021-08-10 DIAGNOSIS — M48.02 STENOSIS OF CERVICAL SPINE: Primary | ICD-10-CM

## 2021-08-10 PROCEDURE — 97112 NEUROMUSCULAR REEDUCATION: CPT | Performed by: PHYSICAL THERAPIST

## 2021-08-10 PROCEDURE — 97012 MECHANICAL TRACTION THERAPY: CPT | Performed by: PHYSICAL THERAPIST

## 2021-08-10 NOTE — PROGRESS NOTES
Physical Therapy Daily Progress Note  Seth Torres   1955   Primary Diagnosis: Stenosis of cervical spine [M48.02]   Type: THERAPY  Noted: 7/13/2021   8/10/2021   Visits: 9       Subjective   Pt reports: Having an epidural later today. Some UE pain after playing cards over the weekend. HEP going well.      Objective     See Exercise, Manual, and Modality Logs for complete treatment.     Patient Education: HEP    Assessment/Plan Symptoms continue to centralize.       Progress per Plan of Care            Timed:         Manual Therapy:    8     mins  81095;     Therapeutic Exercise:         mins  66083;     Neuromuscular Janeth:    8    mins  57608;    Therapeutic Activity:          mins  94421;     Gait Training:           mins  69289;     Ultrasound:          mins  62129;    Ionto                                   mins   21976  Self Care                            mins   38074    Un-Timed:  Electrical Stimulation:         mins  57324 ( );  Traction     15     mins 98794  Low Eval          Mins  42892  Mod Eval          Mins  59494  High Eval                            Mins  03001  Re-Eval                               mins  75250    Timed Treatment:   16   mins   Total Treatment:     31   mins    Ayden Magaña, PT, DPT, OCS  [place] license: [id number]  Physical Therapist

## 2021-08-13 ENCOUNTER — TREATMENT (OUTPATIENT)
Dept: PHYSICAL THERAPY | Facility: CLINIC | Age: 66
End: 2021-08-13

## 2021-08-13 DIAGNOSIS — M48.02 STENOSIS OF CERVICAL SPINE: Primary | ICD-10-CM

## 2021-08-13 PROCEDURE — 97112 NEUROMUSCULAR REEDUCATION: CPT | Performed by: PHYSICAL THERAPIST

## 2021-08-13 PROCEDURE — 97012 MECHANICAL TRACTION THERAPY: CPT | Performed by: PHYSICAL THERAPIST

## 2021-08-13 NOTE — PROGRESS NOTES
Physical Therapy Daily Progress Note  Seth Torres   1955   Primary Diagnosis: Stenosis of cervical spine [M48.02]   Type: THERAPY  Noted: 7/13/2021 8/13/2021   Visits: 10       Subjective   Pt reports: Significantly better after epidural. HEP going well.      Objective     See Exercise, Manual, and Modality Logs for complete treatment.     Patient Education: HEP    Assessment/Plan Progressing well. Reassess next visit.      Progress per Plan of Care            Timed:         Manual Therapy:    8     mins  84762;     Therapeutic Exercise:    8     mins  15646;     Neuromuscular Janeth:        mins  76911;    Therapeutic Activity:          mins  26956;     Gait Training:           mins  05207;     Ultrasound:          mins  44187;    Ionto                                   mins   29274  Self Care                            mins   95984    Un-Timed:  Electrical Stimulation:         mins  11609 ( );  Traction     15     mins 55772  Low Eval          Mins  03002  Mod Eval          Mins  83819  High Eval                            Mins  66285  Re-Eval                               mins  36850    Timed Treatment:   16   mins   Total Treatment:     31   mins    Ayden Magaña, PT, DPT, OCS  [place] license: [id number]  Physical Therapist

## 2021-08-17 ENCOUNTER — TREATMENT (OUTPATIENT)
Dept: PHYSICAL THERAPY | Facility: CLINIC | Age: 66
End: 2021-08-17

## 2021-08-17 DIAGNOSIS — M48.02 STENOSIS OF CERVICAL SPINE: Primary | ICD-10-CM

## 2021-08-17 PROCEDURE — 97140 MANUAL THERAPY 1/> REGIONS: CPT | Performed by: PHYSICAL THERAPIST

## 2021-08-17 PROCEDURE — 97012 MECHANICAL TRACTION THERAPY: CPT | Performed by: PHYSICAL THERAPIST

## 2021-08-17 PROCEDURE — 97110 THERAPEUTIC EXERCISES: CPT | Performed by: PHYSICAL THERAPIST

## 2021-08-17 NOTE — PROGRESS NOTES
Re-Assessment / Re-Certification        Patient: Seth Torres   : 1955  Diagnosis/ICD-10 Code:  Stenosis of cervical spine [M48.02]  Referring practitioner: Juan Manuel Rushing MD  Date of Initial Visit: Type: THERAPY  Noted: 2021  Today's Date: 2021  Patient seen for 11 sessions      Subjective:     Subjective Questionnaire: NDI:26%  Clinical Progress: improved  Home Program Compliance: Yes  Treatment has included: therapeutic exercise, neuromuscular re-education, manual therapy and traction    Subjective Pt reports UE and central symptoms improving. Still having difficulty turning his head to right and waking up with some UE pain. HEP going well.  Objective   Active Range of Motion   Cervical/Thoracic Spine   Cervical     Flexion: 27 degrees   Extension: 30 degrees   Left lateral flexion: 30 degrees   Right lateral flexion: 20 degrees with pain  Left rotation: 55 degrees   Right rotation: 40 degrees with pain   Assessment/Plan   Pt is making good progress toward his therapy goals. Recommend continuing with PT evaluation and treatment.  Short term goals, 1 week: Tolerate HEP progression. met  Voice compliance with activity modification. met  Report improvement in symptoms. met    LTGs, 4 weeks: Improve NDI to 20%. progressing  Cspine AROM to approach wfl and non provocative of UE pain. progressing  Symptoms to be centralized. progressing  Independent with HEP. progressing    Continue 2-3 times per week   30 visits per POC, 90 day certification period  Prognosis to achieve goals: akin Magaña, PT, DPT, OCS  [place] license: [id number]  Physical Therapist      Based upon review of the patient's progress and continued therapy plan, it is my medical opinion that Seth Torres should continue physical therapy treatment at [facility]  [address] [place] [address] [address] [place] [address] [phone].    Provider:  _____________________________    Juan Manuel Rushing MD     Timed:         Manual Therapy:    10     mins  23116;     Therapeutic Exercise:    13     mins  60902;     Neuromuscular Janeth:        mins  11768;    Therapeutic Activity:          mins  55653;     Gait Training:           mins  53316;     Ultrasound:          mins  69919;    Ionto                                   mins   14752  Self Care                            mins   81989    Un-Timed:  Electrical Stimulation:         mins  05704 ( );  Traction     15     mins 63810  Low Eval          Mins  41963  Mod Eval          Mins  00445  High Eval                            Mins  71052  Re-Eval                               mins  55496      Timed Treatment:   23   mins   Total Treatment:     38   mins

## 2021-08-18 ENCOUNTER — TREATMENT (OUTPATIENT)
Dept: PHYSICAL THERAPY | Facility: CLINIC | Age: 66
End: 2021-08-18

## 2021-08-18 DIAGNOSIS — M48.02 STENOSIS OF CERVICAL SPINE: Primary | ICD-10-CM

## 2021-08-18 PROCEDURE — 97140 MANUAL THERAPY 1/> REGIONS: CPT | Performed by: PHYSICAL THERAPIST

## 2021-08-18 PROCEDURE — 97012 MECHANICAL TRACTION THERAPY: CPT | Performed by: PHYSICAL THERAPIST

## 2021-08-18 PROCEDURE — 97110 THERAPEUTIC EXERCISES: CPT | Performed by: PHYSICAL THERAPIST

## 2021-08-18 NOTE — PROGRESS NOTES
Physical Therapy Daily Progress Note  Seth Torres   1955   Primary Diagnosis: Stenosis of cervical spine [M48.02]   Type: THERAPY  Noted: 7/13/2021 8/18/2021   Visits: 12       Subjective   Pt reports: UE symptoms continue to improve. HEP going well.      Objective     See Exercise, Manual, and Modality Logs for complete treatment.     Patient Education: HEP    Assessment/Plan Progressing well.      Progress per Plan of Care            Timed:         Manual Therapy:    10     mins  63404;     Therapeutic Exercise:   13      mins  01954;     Neuromuscular Janeth:        mins  80712;    Therapeutic Activity:          mins  46198;     Gait Training:           mins  60448;     Ultrasound:          mins  27386;    Ionto                                   mins   87101  Self Care                            mins   10299    Un-Timed:  Electrical Stimulation:         mins  02282 ( );  Traction     15     mins 89944  Low Eval          Mins  62871  Mod Eval          Mins  57871  High Eval                            Mins  23857  Re-Eval                               mins  42600    Timed Treatment:   23   mins   Total Treatment:     38   mins    Ayden Magaña, PT, DPT, OCS  [place] license: [id number]  Physical Therapist

## 2021-08-20 ENCOUNTER — TREATMENT (OUTPATIENT)
Dept: PHYSICAL THERAPY | Facility: CLINIC | Age: 66
End: 2021-08-20

## 2021-08-20 DIAGNOSIS — M48.02 STENOSIS OF CERVICAL SPINE: Primary | ICD-10-CM

## 2021-08-20 PROCEDURE — 97012 MECHANICAL TRACTION THERAPY: CPT | Performed by: PHYSICAL THERAPIST

## 2021-08-20 PROCEDURE — 97112 NEUROMUSCULAR REEDUCATION: CPT | Performed by: PHYSICAL THERAPIST

## 2021-08-20 NOTE — PROGRESS NOTES
Physical Therapy Daily Progress Note  Seth Torres   1955   Primary Diagnosis: Stenosis of cervical spine [M48.02]   Type: THERAPY  Noted: 7/13/2021 8/20/2021   Visits: 13       Subjective   Pt reports: UE symptoms and stiffness improving. HEP going well.      Objective     See Exercise, Manual, and Modality Logs for complete treatment.     Patient Education: HEP    Assessment/Plan Progressing well.      Progress per Plan of Care            Timed:         Manual Therapy:    8     mins  83924;     Therapeutic Exercise:         mins  08191;     Neuromuscular Janeth:    8    mins  28994;    Therapeutic Activity:          mins  86719;     Gait Training:           mins  64570;     Ultrasound:          mins  88412;    Ionto                                   mins   79389  Self Care                            mins   15591    Un-Timed:  Electrical Stimulation:         mins  54926 ( );  Traction     15     mins 45176  Low Eval          Mins  10445  Mod Eval          Mins  43952  High Eval                            Mins  10750  Re-Eval                               mins  75504    Timed Treatment:   16   mins   Total Treatment:     31   mins    Ayden Magaña, PT, DPT, OCS  [place] license: [id number]  Physical Therapist

## 2021-08-24 ENCOUNTER — TREATMENT (OUTPATIENT)
Dept: PHYSICAL THERAPY | Facility: CLINIC | Age: 66
End: 2021-08-24

## 2021-08-24 DIAGNOSIS — M48.02 STENOSIS OF CERVICAL SPINE: Primary | ICD-10-CM

## 2021-08-24 PROCEDURE — 97012 MECHANICAL TRACTION THERAPY: CPT | Performed by: PHYSICAL THERAPIST

## 2021-08-24 PROCEDURE — 97140 MANUAL THERAPY 1/> REGIONS: CPT | Performed by: PHYSICAL THERAPIST

## 2021-08-24 PROCEDURE — 97112 NEUROMUSCULAR REEDUCATION: CPT | Performed by: PHYSICAL THERAPIST

## 2021-08-24 NOTE — PROGRESS NOTES
Physical Therapy Daily Progress Note  Seth Torres   1955   Primary Diagnosis: Stenosis of cervical spine [M48.02]   Type: THERAPY  Noted: 7/13/2021 8/24/2021   Visits: 14       Subjective   Pt reports: Having episodes of UE pain but this improves with HEP and rx.      Objective     See Exercise, Manual, and Modality Logs for complete treatment.     Patient Education: HEP    Assessment/Plan Progressing well.      Progress per Plan of Care            Timed:         Manual Therapy:    10     mins  80261;     Therapeutic Exercise:         mins  54770;     Neuromuscular Janeth:    15    mins  96625;    Therapeutic Activity:          mins  71143;     Gait Training:           mins  18801;     Ultrasound:          mins  82231;    Ionto                                   mins   37271  Self Care                            mins   51955    Un-Timed:  Electrical Stimulation:         mins  48124 ( );  Traction     15     mins 81986  Low Eval          Mins  13309  Mod Eval          Mins  54120  High Eval                            Mins  59716  Re-Eval                               mins  42937    Timed Treatment:   25   mins   Total Treatment:     40   mins    Ayden Magaña, PT, DPT, OCS  [place] license: [id number]  Physical Therapist

## 2021-08-25 ENCOUNTER — TREATMENT (OUTPATIENT)
Dept: PHYSICAL THERAPY | Facility: CLINIC | Age: 66
End: 2021-08-25

## 2021-08-25 DIAGNOSIS — M48.02 STENOSIS OF CERVICAL SPINE: Primary | ICD-10-CM

## 2021-08-25 PROCEDURE — 97112 NEUROMUSCULAR REEDUCATION: CPT | Performed by: PHYSICAL THERAPIST

## 2021-08-25 PROCEDURE — 97140 MANUAL THERAPY 1/> REGIONS: CPT | Performed by: PHYSICAL THERAPIST

## 2021-08-25 PROCEDURE — 97012 MECHANICAL TRACTION THERAPY: CPT | Performed by: PHYSICAL THERAPIST

## 2021-08-25 NOTE — PROGRESS NOTES
Physical Therapy Daily Progress Note  Seth Torres   1955   Primary Diagnosis: Stenosis of cervical spine [M48.02]   Type: THERAPY  Noted: 7/13/2021 8/25/2021   Visits: 15       Subjective   Pt reports: UE symptoms improving. HEP going well.      Objective     See Exercise, Manual, and Modality Logs for complete treatment.     Patient Education: HEP    Assessment/Plan Symptoms centralizing.      Progress per Plan of Care            Timed:         Manual Therapy:    15     mins  03593;     Therapeutic Exercise:         mins  58648;     Neuromuscular Janeth:    8    mins  62505;    Therapeutic Activity:          mins  99797;     Gait Training:           mins  52756;     Ultrasound:          mins  72040;    Ionto                                   mins   66190  Self Care                            mins   91024    Un-Timed:  Electrical Stimulation:         mins  35572 ( );  Traction     15     mins 47880  Low Eval          Mins  05735  Mod Eval          Mins  35572  High Eval                            Mins  18159  Re-Eval                               mins  54682    Timed Treatment:   23   mins   Total Treatment:     38   mins    Ayden Magaña, PT, DPT, OCS  [place] license: [id number]  Physical Therapist

## 2021-08-27 ENCOUNTER — TREATMENT (OUTPATIENT)
Dept: PHYSICAL THERAPY | Facility: CLINIC | Age: 66
End: 2021-08-27

## 2021-08-27 DIAGNOSIS — M48.02 STENOSIS OF CERVICAL SPINE: Primary | ICD-10-CM

## 2021-08-27 PROCEDURE — 97012 MECHANICAL TRACTION THERAPY: CPT | Performed by: PHYSICAL THERAPIST

## 2021-08-27 PROCEDURE — 97140 MANUAL THERAPY 1/> REGIONS: CPT | Performed by: PHYSICAL THERAPIST

## 2021-08-27 PROCEDURE — 97112 NEUROMUSCULAR REEDUCATION: CPT | Performed by: PHYSICAL THERAPIST

## 2021-08-27 NOTE — PROGRESS NOTES
Physical Therapy Daily Progress Note  Seth Torres   1955   Primary Diagnosis: Stenosis of cervical spine [M48.02]   Type: THERAPY  Noted: 7/13/2021 8/27/2021   Visits: 16       Subjective   Pt reports: Symptoms continuing to improve.       Objective     See Exercise, Manual, and Modality Logs for complete treatment.     Patient Education: HEP    Assessment/Plan Continuing to make steady progress.      Progress per Plan of Care            Timed:         Manual Therapy:    15     mins  37017;     Therapeutic Exercise:         mins  84113;     Neuromuscular Janeth:    8    mins  44150;    Therapeutic Activity:          mins  69338;     Gait Training:           mins  70388;     Ultrasound:          mins  53472;    Ionto                                   mins   06593  Self Care                            mins   12726    Un-Timed:  Electrical Stimulation:         mins  94662 ( );  Traction     15     mins 43494  Low Eval          Mins  97614  Mod Eval          Mins  01764  High Eval                            Mins  85286  Re-Eval                               mins  29204    Timed Treatment:   23   mins   Total Treatment:     38   mins    Ayden Magaña, PT, DPT, OCS  [place] license: [id number]  Physical Therapist

## 2021-08-31 ENCOUNTER — TRANSCRIBE ORDERS (OUTPATIENT)
Dept: ADMINISTRATIVE | Facility: HOSPITAL | Age: 66
End: 2021-08-31

## 2021-08-31 ENCOUNTER — TREATMENT (OUTPATIENT)
Dept: PHYSICAL THERAPY | Facility: CLINIC | Age: 66
End: 2021-08-31

## 2021-08-31 ENCOUNTER — LAB (OUTPATIENT)
Dept: LAB | Facility: HOSPITAL | Age: 66
End: 2021-08-31

## 2021-08-31 DIAGNOSIS — I10 ESSENTIAL HYPERTENSION, MALIGNANT: ICD-10-CM

## 2021-08-31 DIAGNOSIS — K21.9 CHALASIA OF LOWER ESOPHAGEAL SPHINCTER: ICD-10-CM

## 2021-08-31 DIAGNOSIS — I48.91 ATRIAL FIBRILLATION, UNSPECIFIED TYPE (HCC): ICD-10-CM

## 2021-08-31 DIAGNOSIS — M48.02 STENOSIS OF CERVICAL SPINE: Primary | ICD-10-CM

## 2021-08-31 DIAGNOSIS — G47.33 OBSTRUCTIVE SLEEP APNEA (ADULT) (PEDIATRIC): ICD-10-CM

## 2021-08-31 DIAGNOSIS — E11.9 DIABETES MELLITUS WITHOUT COMPLICATION (HCC): Primary | ICD-10-CM

## 2021-08-31 DIAGNOSIS — E11.9 DIABETES MELLITUS WITHOUT COMPLICATION (HCC): ICD-10-CM

## 2021-08-31 LAB
ALBUMIN SERPL-MCNC: 4.1 G/DL (ref 3.5–5.2)
ALBUMIN UR-MCNC: 2.6 MG/DL
ALBUMIN/GLOB SERPL: 1.5 G/DL
ALP SERPL-CCNC: 94 U/L (ref 39–117)
ALT SERPL W P-5'-P-CCNC: 41 U/L (ref 1–41)
ANION GAP SERPL CALCULATED.3IONS-SCNC: 9.7 MMOL/L (ref 5–15)
AST SERPL-CCNC: 22 U/L (ref 1–40)
BASOPHILS # BLD AUTO: 0.04 10*3/MM3 (ref 0–0.2)
BASOPHILS NFR BLD AUTO: 0.6 % (ref 0–1.5)
BILIRUB SERPL-MCNC: 0.5 MG/DL (ref 0–1.2)
BUN SERPL-MCNC: 17 MG/DL (ref 8–23)
BUN/CREAT SERPL: 12.5 (ref 7–25)
CALCIUM SPEC-SCNC: 9.3 MG/DL (ref 8.6–10.5)
CHLORIDE SERPL-SCNC: 104 MMOL/L (ref 98–107)
CHOLEST SERPL-MCNC: 167 MG/DL (ref 0–200)
CO2 SERPL-SCNC: 27.3 MMOL/L (ref 22–29)
CREAT SERPL-MCNC: 1.36 MG/DL (ref 0.76–1.27)
DEPRECATED RDW RBC AUTO: 40.4 FL (ref 37–54)
EOSINOPHIL # BLD AUTO: 0.13 10*3/MM3 (ref 0–0.4)
EOSINOPHIL NFR BLD AUTO: 2 % (ref 0.3–6.2)
ERYTHROCYTE [DISTWIDTH] IN BLOOD BY AUTOMATED COUNT: 13.1 % (ref 12.3–15.4)
GFR SERPL CREATININE-BSD FRML MDRD: 53 ML/MIN/1.73
GLOBULIN UR ELPH-MCNC: 2.8 GM/DL
GLUCOSE SERPL-MCNC: 155 MG/DL (ref 65–99)
HBA1C MFR BLD: 7.9 % (ref 3.5–5.6)
HCT VFR BLD AUTO: 48.6 % (ref 37.5–51)
HDLC SERPL-MCNC: 46 MG/DL (ref 40–60)
HGB BLD-MCNC: 16.4 G/DL (ref 13–17.7)
IMM GRANULOCYTES # BLD AUTO: 0.02 10*3/MM3 (ref 0–0.05)
IMM GRANULOCYTES NFR BLD AUTO: 0.3 % (ref 0–0.5)
LDLC SERPL CALC-MCNC: 98 MG/DL (ref 0–100)
LDLC/HDLC SERPL: 2.06 {RATIO}
LYMPHOCYTES # BLD AUTO: 2.26 10*3/MM3 (ref 0.7–3.1)
LYMPHOCYTES NFR BLD AUTO: 35.4 % (ref 19.6–45.3)
MCH RBC QN AUTO: 28.7 PG (ref 26.6–33)
MCHC RBC AUTO-ENTMCNC: 33.7 G/DL (ref 31.5–35.7)
MCV RBC AUTO: 85 FL (ref 79–97)
MONOCYTES # BLD AUTO: 0.56 10*3/MM3 (ref 0.1–0.9)
MONOCYTES NFR BLD AUTO: 8.8 % (ref 5–12)
NEUTROPHILS NFR BLD AUTO: 3.38 10*3/MM3 (ref 1.7–7)
NEUTROPHILS NFR BLD AUTO: 52.9 % (ref 42.7–76)
NRBC BLD AUTO-RTO: 0 /100 WBC (ref 0–0.2)
PLATELET # BLD AUTO: 164 10*3/MM3 (ref 140–450)
PMV BLD AUTO: 11.1 FL (ref 6–12)
POTASSIUM SERPL-SCNC: 4.7 MMOL/L (ref 3.5–5.2)
PROT SERPL-MCNC: 6.9 G/DL (ref 6–8.5)
PSA SERPL-MCNC: 3.23 NG/ML (ref 0–4)
RBC # BLD AUTO: 5.72 10*6/MM3 (ref 4.14–5.8)
SODIUM SERPL-SCNC: 141 MMOL/L (ref 136–145)
TRIGL SERPL-MCNC: 132 MG/DL (ref 0–150)
VLDLC SERPL-MCNC: 23 MG/DL (ref 5–40)
WBC # BLD AUTO: 6.39 10*3/MM3 (ref 3.4–10.8)

## 2021-08-31 PROCEDURE — 85025 COMPLETE CBC W/AUTO DIFF WBC: CPT

## 2021-08-31 PROCEDURE — 82043 UR ALBUMIN QUANTITATIVE: CPT

## 2021-08-31 PROCEDURE — 80061 LIPID PANEL: CPT

## 2021-08-31 PROCEDURE — 97012 MECHANICAL TRACTION THERAPY: CPT | Performed by: PHYSICAL THERAPIST

## 2021-08-31 PROCEDURE — 80053 COMPREHEN METABOLIC PANEL: CPT

## 2021-08-31 PROCEDURE — 83036 HEMOGLOBIN GLYCOSYLATED A1C: CPT

## 2021-08-31 PROCEDURE — G0103 PSA SCREENING: HCPCS

## 2021-08-31 PROCEDURE — 97112 NEUROMUSCULAR REEDUCATION: CPT | Performed by: PHYSICAL THERAPIST

## 2021-08-31 PROCEDURE — 36415 COLL VENOUS BLD VENIPUNCTURE: CPT

## 2021-08-31 NOTE — PROGRESS NOTES
Physical Therapy Daily Progress Note  Seth Torres   1955   Primary Diagnosis: Stenosis of cervical spine [M48.02]   Type: THERAPY  Noted: 7/13/2021 8/31/2021   Visits: 17       Subjective   Pt reports: Epidural scheduled for this afternoon. Symptoms continuing to improve. HEP going well.      Objective     See Exercise, Manual, and Modality Logs for complete treatment.     Patient Education: HEP    Assessment/Plan Progressing well.      Progress per Plan of Care            Timed:         Manual Therapy:    5     mins  64063;     Therapeutic Exercise:         mins  55545;     Neuromuscular Janeth:    8    mins  43562;    Therapeutic Activity:          mins  84212;     Gait Training:           mins  25765;     Ultrasound:          mins  00729;    Ionto                                   mins   28844  Self Care                            mins   93701    Un-Timed:  Electrical Stimulation:         mins  16651 ( );  Traction     15     mins 59063  Low Eval          Mins  51689  Mod Eval          Mins  84026  High Eval                            Mins  20721  Re-Eval                               mins  35276    Timed Treatment:   13   mins   Total Treatment:     28   mins    Ayden Magaña, PT, DPT, OCS  [place] license: [id number]  Physical Therapist

## 2021-09-03 ENCOUNTER — TELEPHONE (OUTPATIENT)
Dept: PHYSICAL THERAPY | Facility: CLINIC | Age: 66
End: 2021-09-03

## 2021-09-17 ENCOUNTER — TREATMENT (OUTPATIENT)
Dept: PHYSICAL THERAPY | Facility: CLINIC | Age: 66
End: 2021-09-17

## 2021-09-17 DIAGNOSIS — M48.02 STENOSIS OF CERVICAL SPINE: Primary | ICD-10-CM

## 2021-09-17 PROCEDURE — 97535 SELF CARE MNGMENT TRAINING: CPT | Performed by: PHYSICAL THERAPIST

## 2021-09-17 NOTE — PROGRESS NOTES
Physical Therapy Daily Progress Note  Seth Torres   1955   Primary Diagnosis: Stenosis of cervical spine [M48.02]   Type: THERAPY  Noted: 7/13/2021 9/17/2021   Visits: 18       Subjective   Pt reports: Pt has a Green unit today. Symptoms centralized and HEP going well.      Objective     See Exercise, Manual, and Modality Logs for complete treatment.     Patient Education: HEP    Assessment/Plan Pt instructed on home traction unit use and set up. Pt to try 2-3 weeks of HEP and home traction then follow up here if needed.      Progress per Plan of Care            Timed:         Manual Therapy:         mins  74616;     Therapeutic Exercise:         mins  10121;     Neuromuscular Janeth:        mins  26859;    Therapeutic Activity:          mins  78663;     Gait Training:           mins  72506;     Ultrasound:          mins  09889;    Ionto                                   mins   08732  Self Care                       15     mins   16618    Un-Timed:  Electrical Stimulation:         mins  84069 ( );  Traction          mins 50904  Low Eval          Mins  33450  Mod Eval          Mins  73095  High Eval                            Mins  87232  Re-Eval                               mins  37473    Timed Treatment:   15   mins   Total Treatment:     15   mins    Ayden Magaña, PT, DPT, OCS  [place] license: [id number]  Physical Therapist

## 2021-11-05 ENCOUNTER — ANESTHESIA (OUTPATIENT)
Dept: GASTROENTEROLOGY | Facility: HOSPITAL | Age: 66
End: 2021-11-05

## 2021-11-05 ENCOUNTER — ANESTHESIA EVENT (OUTPATIENT)
Dept: GASTROENTEROLOGY | Facility: HOSPITAL | Age: 66
End: 2021-11-05

## 2021-11-05 ENCOUNTER — ON CAMPUS - OUTPATIENT (OUTPATIENT)
Dept: URBAN - METROPOLITAN AREA HOSPITAL 85 | Facility: HOSPITAL | Age: 66
End: 2021-11-05
Payer: COMMERCIAL

## 2021-11-05 ENCOUNTER — HOSPITAL ENCOUNTER (OUTPATIENT)
Facility: HOSPITAL | Age: 66
Setting detail: HOSPITAL OUTPATIENT SURGERY
Discharge: HOME | End: 2021-11-05
Attending: INTERNAL MEDICINE | Admitting: INTERNAL MEDICINE

## 2021-11-05 VITALS — HEART RATE: 62 BPM | DIASTOLIC BLOOD PRESSURE: 74 MMHG | SYSTOLIC BLOOD PRESSURE: 150 MMHG | OXYGEN SATURATION: 95 %

## 2021-11-05 VITALS
RESPIRATION RATE: 16 BRPM | DIASTOLIC BLOOD PRESSURE: 82 MMHG | TEMPERATURE: 98.5 F | SYSTOLIC BLOOD PRESSURE: 128 MMHG | OXYGEN SATURATION: 95 % | HEIGHT: 71 IN | WEIGHT: 315 LBS | HEART RATE: 65 BPM | BODY MASS INDEX: 44.1 KG/M2

## 2021-11-05 DIAGNOSIS — D12.2 BENIGN NEOPLASM OF ASCENDING COLON: ICD-10-CM

## 2021-11-05 DIAGNOSIS — Z86.010 PERSONAL HISTORY OF COLONIC POLYPS: ICD-10-CM

## 2021-11-05 DIAGNOSIS — K57.30 DIVERTICULOSIS OF LARGE INTESTINE WITHOUT PERFORATION OR ABS: ICD-10-CM

## 2021-11-05 LAB — GLUCOSE BLDC GLUCOMTR-MCNC: 159 MG/DL (ref 70–105)

## 2021-11-05 PROCEDURE — 82962 GLUCOSE BLOOD TEST: CPT

## 2021-11-05 PROCEDURE — 45385 COLONOSCOPY W/LESION REMOVAL: CPT | Mod: PT | Performed by: INTERNAL MEDICINE

## 2021-11-05 PROCEDURE — 88305 TISSUE EXAM BY PATHOLOGIST: CPT | Performed by: INTERNAL MEDICINE

## 2021-11-05 PROCEDURE — 25010000002 PROPOFOL 10 MG/ML EMULSION: Performed by: ANESTHESIOLOGY

## 2021-11-05 RX ORDER — SODIUM CHLORIDE 9 MG/ML
9 INJECTION, SOLUTION INTRAVENOUS CONTINUOUS
Status: DISCONTINUED | OUTPATIENT
Start: 2021-11-05 | End: 2021-11-05 | Stop reason: HOSPADM

## 2021-11-05 RX ORDER — MAGNESIUM CARB/ALUMINUM HYDROX 105-160MG
296 TABLET,CHEWABLE ORAL ONCE
Status: DISCONTINUED | OUTPATIENT
Start: 2021-11-05 | End: 2021-11-05 | Stop reason: HOSPADM

## 2021-11-05 RX ORDER — SODIUM CHLORIDE 0.9 % (FLUSH) 0.9 %
3-10 SYRINGE (ML) INJECTION AS NEEDED
Status: CANCELLED | OUTPATIENT
Start: 2021-11-05

## 2021-11-05 RX ORDER — ONDANSETRON 2 MG/ML
4 INJECTION INTRAMUSCULAR; INTRAVENOUS ONCE AS NEEDED
Status: DISCONTINUED | OUTPATIENT
Start: 2021-11-05 | End: 2021-11-05 | Stop reason: HOSPADM

## 2021-11-05 RX ORDER — PROPOFOL 10 MG/ML
VIAL (ML) INTRAVENOUS AS NEEDED
Status: DISCONTINUED | OUTPATIENT
Start: 2021-11-05 | End: 2021-11-05 | Stop reason: SURG

## 2021-11-05 RX ORDER — SODIUM CHLORIDE 0.9 % (FLUSH) 0.9 %
3 SYRINGE (ML) INJECTION EVERY 12 HOURS SCHEDULED
Status: CANCELLED | OUTPATIENT
Start: 2021-11-05

## 2021-11-05 RX ORDER — LIDOCAINE HYDROCHLORIDE 10 MG/ML
INJECTION, SOLUTION EPIDURAL; INFILTRATION; INTRACAUDAL; PERINEURAL AS NEEDED
Status: DISCONTINUED | OUTPATIENT
Start: 2021-11-05 | End: 2021-11-05 | Stop reason: SURG

## 2021-11-05 RX ADMIN — LIDOCAINE HYDROCHLORIDE 50 MG: 10 INJECTION, SOLUTION EPIDURAL; INFILTRATION; INTRACAUDAL; PERINEURAL at 09:57

## 2021-11-05 RX ADMIN — SODIUM CHLORIDE 9 ML/HR: 9 INJECTION, SOLUTION INTRAVENOUS at 09:05

## 2021-11-05 RX ADMIN — PROPOFOL 250 MG: 10 INJECTION, EMULSION INTRAVENOUS at 09:57

## 2021-11-05 NOTE — ANESTHESIA POSTPROCEDURE EVALUATION
Patient: Seth Torres    Procedure Summary     Date: 11/05/21 Room / Location: [facility] ENDOSCOPY 4 / [facility] ENDOSCOPY    Anesthesia Start: 0949 Anesthesia Stop: 1020    Procedure: COLONOSCOPY WITH POLYPECTOMY X 5 (N/A ) Diagnosis:       Personal history of colonic polyps      (Personal history of colonic polyps [Z86.010])    Surgeons: Ronaldo Forrester MD Provider: Bob Branch MD    Anesthesia Type: MAC ASA Status: 3          Anesthesia Type: MAC    Vitals  Vitals Value Taken Time   /64 11/05/21 1032   Temp     Pulse 62 11/05/21 1032   Resp 20 11/05/21 1020   SpO2 96 % 11/05/21 1031   Vitals shown include unvalidated device data.        Post Anesthesia Care and Evaluation    Patient location during evaluation: PACU  Patient participation: complete - patient participated  Level of consciousness: awake  Pain scale: See nurse's notes for pain score.  Pain management: adequate  Airway patency: patent  Anesthetic complications: No anesthetic complications  PONV Status: none  Cardiovascular status: acceptable  Respiratory status: acceptable  Hydration status: acceptable    Comments: Patient seen and examined postoperatively; vital signs stable; SpO2 greater than or equal to 90%; cardiopulmonary status stable; nausea/vomiting adequately controlled; pain adequately controlled; no apparent anesthesia complications; patient discharged from anesthesia care when discharge criteria were met

## 2021-11-05 NOTE — OP NOTE
COLONOSCOPY Procedure Report    Patient Name:  Seth Torres  YOB: 1955    Date of Surgery:  11/5/2021     Pre-Op Diagnosis:  Personal history of colonic polyps [Z86.010]    Post-Op Diagnosis:  1. Colon polyps removed with cold snare polypectomy  2. Diverticulosis       Procedure/CPT® Codes:      Procedure(s):  COLONOSCOPY WITH POLYPECTOMY X 4    Staff:  Surgeon(s):  Ronaldo Forrester MD      Anesthesia: Monitored Anesthesia Care    Description of Procedure:  A description of the procedure as well as risks, benefits and alternative methods were explained to the patient who voiced understanding and signed the corresponding consent form. A physical exam was performed and vital signs were monitored throughout the procedure.    After informed consent was obtained the patient was placed in the left lateral decubitus position and sedated as above.  Digital rectal examination was performed and was normal.  The Olympus video colonoscope was inserted into the rectum and advanced to the cecum without difficulty.  A careful examination of the colon was performed as the colonoscope was slowly withdrawn.  The bowel prep was excellent.  The cecum and appendiceal orifice were identified. The scope was then retroflexed and the distal rectum was visualized. The procedure was not difficult and there were no immediate complications.  There was no blood loss.    Findings:   5 polyps located in the cecum, ascending colon, and ascending colon measuring approximately 4 to 6 mm in diameter removed with cold snare polypectomy. Mild sigmoid diverticulosis noted.    Impression:  1. Colon polyps  2. Diverticulosis    Recommendations:  1. High-fiber diet  2. Repeat colonoscopy in 5 years  3. Follow-up my office as needed      Ronaldo Forrester MD     Date: 11/5/2021    Time: 10:17 EDT

## 2021-11-05 NOTE — DISCHARGE INSTRUCTIONS
A responsible adult should stay with you and you should rest quietly for the rest of the day.    Do not drink alcohol, drive, operate any heavy machinery or power tools or make any legal/important decisions for the next 24 hours.     Progress your diet as tolerated.  If you begin to experience severe pain, increased shortness of breath, racing heartbeat or a fever above 101 F, seek immediate medical attention.     Follow up with MD as instructed. Call office for results in 3 to 5 days if needed.    [phone]    Recommendations:  1. High-fiber diet  2. Repeat colonoscopy in 5 years  3. Follow-up my office as needed

## 2021-11-05 NOTE — ANESTHESIA PREPROCEDURE EVALUATION
Anesthesia Evaluation     Patient summary reviewed and Nursing notes reviewed   NPO Solid Status: > 8 hours  NPO Liquid Status: > 8 hours           Airway   Mallampati: II  TM distance: >3 FB  Neck ROM: full  No difficulty expected  Dental - normal exam     Pulmonary - normal exam   (+) pneumonia , sleep apnea,   Cardiovascular - normal exam    ECG reviewed  PT is on anticoagulation therapy    (+) hypertension, CAD, dysrhythmias, DVT, hyperlipidemia,       Neuro/Psych  GI/Hepatic/Renal/Endo    (+) morbid obesity,  renal disease, diabetes mellitus,     Musculoskeletal     Abdominal  - normal exam    Bowel sounds: normal.   Substance History      OB/GYN          Other   arthritis, blood dyscrasia,     ROS/Med Hx Other: SR                  Anesthesia Plan    ASA 3     MAC     intravenous induction     Anesthetic plan, all risks, benefits, and alternatives have been provided, discussed and informed consent has been obtained with: patient.

## 2021-11-05 NOTE — H&P
GI PREOPERATIVE HISTORY AND PHYSICAL:    Referring Provider:    Myra Tello MD    Chief complaint: Personal history of colon polyps    Subjective .     History of present illness:      Seth Torres is a 66 y.o. male who presents today for Procedure(s):  COLONOSCOPY for the indications listed below.     Personal history of colon polyps    The updated Patient Profile was reviewed prior to the procedure, in conjunction with the Physical Exam, including medical conditions, surgical procedures, medications, allergies, family history and social history.     Pre-operatively, I reviewed the indication(s) for the procedure, the risks of the procedure [including but not limited to: unexpected bleeding possibly requiring hospitalization and/or unplanned repeat procedures, perforation possibly requiring surgical treatment, missed lesions and complications of sedation/MAC (also explained by anesthesia staff)].     I have evaluated the patient for risks associated with the planned anesthesia and the procedure to be performed and find the patient an acceptable candidate for IV sedation.    Multiple opportunities were provided for any questions or concerns, and all questions were answered satisfactorily before any anesthesia was administered. We will proceed with the planned procedure.    Past Medical History:  Past Medical History:   Diagnosis Date   • Allergic    • CAD (coronary artery disease) 1997   • CKD (chronic kidney disease), stage III ([facility]) 06/2018    Dr. West   • Diabetes mellitus ([facility])    • Diverticulosis 10/2016   • DJD (degenerative joint disease)    • DVT, lower extremity, recurrent, left ([facility]) 02/2017   • Elevated factor VIII level 2018   • Factor 5 Leiden mutation, heterozygous ([facility])    • Hyperlipidemia 1997   • Hypertension 1997   • Pneumonia 2018 January 2018 and June 2018   • Renal calculi 09/2016   • Sleep apnea 2018    Dr. Grier   • Urinary tract infection        Past Surgical History:  Past  Surgical History:   Procedure Laterality Date   • CARDIAC CATHETERIZATION     • CYSTOSCOPY URETEROSCOPY Left 12/10/2018    Dr. Ty   • HAND SURGERY Left     Ganglion cyst removed palm of left hand   • HAND SURGERY  2019   • REPLACEMENT TOTAL KNEE Bilateral     one in  and the other in    • SEPTOPLASTY      deviated septum       Social History:  Social History     Tobacco Use   • Smoking status: Former Smoker     Packs/day: 1.50     Years: 2.00     Pack years: 3.00     Types: Cigarettes     Start date: 1978     Quit date: 1980     Years since quittin.8   • Smokeless tobacco: Never Used   • Tobacco comment: smoked one and a half packs of cigarettes a day for two years quitting at age 36   Vaping Use   • Vaping Use: Never used   Substance Use Topics   • Alcohol use: Yes     Alcohol/week: 2.0 standard drinks     Types: 2 Cans of beer per week     Comment: drinks two beers monthly   • Drug use: No       Family History:  Family History   Problem Relation Age of Onset   • Lymphoma Brother 42        Non Hodgkins lymphoma   • Stroke Brother    • Heart attack Other         Extensive MI History on Paternal side   • Clotting disorder Sister    • Heart attack Father    • Heart failure Father    • Heart attack Sister    • Heart attack Brother    • Heart disease Brother    • Hypertension Brother    • Heart attack Paternal Aunt    • Heart attack Paternal Uncle        Medications:  No medications prior to admission.       Scheduled Meds:  Continuous Infusions:No current facility-administered medications for this encounter.    PRN Meds:.    ALLERGIES:  Bee venom and Gemfibrozil    ROS:  The following systems were reviewed and negative;   Constitution:  No fevers, chills, no unintentional weight loss  Skin: no rash, no jaundice  Eyes:  No blurry vision, no eye pain  HENT:  No change in hearing or smell  Resp:  No dyspnea or cough  CV:  No chest pain or palpitations  :  No dysuria,  "hematuria  Musculoskeletal:  No leg cramps or arthralgias  Neuro:  No tremor, no numbness  Psych:  No depression or confsuion    Objective     Vital Signs:   Vitals:    11/01/21 1057   Weight: (!) 143 kg (315 lb)   Height: 180.3 cm (71\")       Physical Exam:       General Appearance:    Awake and alert, in no acute distress   Head:    Normocephalic, without obvious abnormality, atraumatic   Throat:   No oral lesions, no thrush, oral mucosa moist   Lungs  Cardiac:  Abdomen:  Extremities:     Respirations regular, even and unlabored    Regular rate and rhythm, no murmur, gallop, rub    Non-distended, good bowel sounds, non tender, no masses     No edema, pulses 2+   Skin:   No rash, no jaundice       Results Review:  Lab Results (last 24 hours)     ** No results found for the last 24 hours. **          Imaging Results (Last 24 Hours)     ** No results found for the last 24 hours. **           I reviewed the patient's labs and imaging.    ASSESSMENT AND PLAN:  Personal history of colon polyps    Active Problems:    * No active hospital problems. *       Procedure(s):  COLONOSCOPY      I discussed the patients findings and my recommendations with the patient.    Ronaldo Forrester MD  11/05/21  07:25 EDT  "

## 2021-11-08 LAB
LAB AP CASE REPORT: NORMAL
PATH REPORT.FINAL DX SPEC: NORMAL
PATH REPORT.GROSS SPEC: NORMAL

## 2022-02-01 ENCOUNTER — OFFICE VISIT (OUTPATIENT)
Dept: CARDIOLOGY | Facility: CLINIC | Age: 67
End: 2022-02-01

## 2022-02-01 VITALS
BODY MASS INDEX: 44.1 KG/M2 | HEART RATE: 66 BPM | HEIGHT: 71 IN | DIASTOLIC BLOOD PRESSURE: 96 MMHG | OXYGEN SATURATION: 97 % | SYSTOLIC BLOOD PRESSURE: 167 MMHG | WEIGHT: 315 LBS

## 2022-02-01 DIAGNOSIS — N18.30 STAGE 3 CHRONIC KIDNEY DISEASE, UNSPECIFIED WHETHER STAGE 3A OR 3B CKD: ICD-10-CM

## 2022-02-01 DIAGNOSIS — Z79.01 LONG TERM (CURRENT) USE OF ANTICOAGULANTS: ICD-10-CM

## 2022-02-01 DIAGNOSIS — I48.0 PAROXYSMAL ATRIAL FIBRILLATION: Primary | ICD-10-CM

## 2022-02-01 DIAGNOSIS — I82.402 RECURRENT DEEP VEIN THROMBOSIS (DVT) OF LEFT LOWER EXTREMITY: ICD-10-CM

## 2022-02-01 PROCEDURE — 93000 ELECTROCARDIOGRAM COMPLETE: CPT | Performed by: INTERNAL MEDICINE

## 2022-02-01 PROCEDURE — 99214 OFFICE O/P EST MOD 30 MIN: CPT | Performed by: INTERNAL MEDICINE

## 2022-02-01 NOTE — PROGRESS NOTES
Encounter Date:02/01/2022  Last seen 7/26/2021      Patient ID: Seth Torres is a 66 y.o. male.    Chief Complaint:    Atrial fibrillation  Coronary artery disease  Anticoagulation management  Renal dysfunction        History of Present Illness  Since I have last seen, the patient has been without any chest discomfort ,shortness of breath, palpitations, dizziness or syncope.  Denies having any headache ,abdominal pain ,nausea, vomiting , diarrhea constipation, loss of weight or loss of appetite.  Denies having any excessive bruising ,hematuria or blood in the stool.    Review of all systems negative except as indicated.    Reviewed ROS.    Assessment and Plan      ]]]]]]]]]]]]]]]]]]]]   impression  ==========   -history of atrial fibrillation -converted and maintaining sinus rhythm.   Echocardiogram showed normal left ventricular function without any pericardial effusion.  June 2018.      Patient had pneumonia and febrile illness associated with atrial fibrillatio  -History of pneumonia-left lower lobe seen on chest x-ray as well of conformed by CT scan.      -stable angina pectoris   mild coronary artery disease.  Cardiac catheterization 2012 revealed 50-60% lad disease     -anticoagulation -on Eliquis as long-term treatment for DVT      -Renal dysfunction bun35 cr 2.4 hypertension dyslipidemia      - history of DVT Status post thrombectomy.  Status post IVC filter placement     -Exogenous obesity     -allergic to bee venom lopid  =======  Plan  ==========  History of atrial fibrillation  Patient has converted and maintaining sinus rhythm.  EKG showed sinus rhythm without any ischemic changes.-2/1/2022     Chronic coronary artery disease  Patient is not having any angina pectoris or congestive heart failure.     Anticoagulation-on Eliquis.  Observe for toxic effects.     Hypertension-180/115.  Patient has normal multiple blood pressures at home as well as at physician's  offices.  Observe.     Dyslipidemia-continue pravastatin     Medications were reviewed and updated.  patient is off amiodarone  Continue Cardizem and Eliquis  Continue Eliquis as long-term therapy for DVT etc.    Followup in the office in  6 months with EKG  Further plan will depend on patient's progress.  [[[[[[[[[[[[[[[[[[                         Diagnosis Plan   1. Paroxysmal atrial fibrillation (HCC)  ECG 12 Lead   2. Stage 3 chronic kidney disease, unspecified whether stage 3a or 3b CKD (HCC)  ECG 12 Lead   3. Long term (current) use of anticoagulants  ECG 12 Lead   4. Recurrent deep vein thrombosis (DVT) of left lower extremity (HCC)  ECG 12 Lead   LAB RESULTS (LAST 7 DAYS)    CBC        BMP        CMP         BNP        TROPONIN        CoAg        Creatinine Clearance  CrCl cannot be calculated (Patient's most recent lab result is older than the maximum 30 days allowed.).    ABG        Radiology  No radiology results for the last day                The following portions of the patient's history were reviewed and updated as appropriate: allergies, current medications, past family history, past medical history, past social history, past surgical history and problem list.    Review of Systems   Constitutional: Negative for malaise/fatigue.   Cardiovascular: Negative for chest pain, leg swelling, palpitations and syncope.   Respiratory: Negative for shortness of breath.    Skin: Negative for rash.   Gastrointestinal: Negative for nausea and vomiting.   Neurological: Negative for dizziness, light-headedness and numbness.   All other systems reviewed and are negative.        Current Outpatient Medications:   •  allopurinol (ZYLOPRIM) 100 MG tablet, Take 1 tablet by mouth Daily., Disp: , Rfl:   •  apixaban (Eliquis) 5 MG tablet tablet, Take 2.5 mg by mouth Every 12 (Twelve) Hours., Disp: , Rfl:   •  cholecalciferol (Vitamin D) 25 MCG (1000 UT) tablet, Take 1,000 Units by mouth Daily., Disp: , Rfl:   •  diltiazem XR  (DILACOR XR) 240 MG 24 hr capsule, 1 capsule Daily., Disp: , Rfl:   •  furosemide (LASIX) 40 MG tablet, FUROSEMIDE 40 MG TABS, Disp: , Rfl:   •  hydrALAZINE (APRESOLINE) 25 MG tablet, Take 25 mg by mouth 2 (Two) Times a Day., Disp: , Rfl:   •  isosorbide mononitrate (ISMO,MONOKET) 20 MG tablet, Take 20 mg by mouth Daily., Disp: , Rfl:   •  Magnesium Oxide -Mg Supplement 400 MG capsule, MAGNESIUM 400 MG CAPS, Disp: , Rfl:   •  metFORMIN (FORTAMET) 500 MG (OSM) 24 hr tablet, Daily., Disp: , Rfl:   •  omeprazole (priLOSEC) 40 MG capsule, Take 40 mg by mouth Daily., Disp: , Rfl:   •  potassium chloride (KLOR-CON) 20 MEQ packet, Take 20 mEq by mouth Daily., Disp: , Rfl:   •  pravastatin (PRAVACHOL) 80 MG tablet, Daily., Disp: , Rfl:   •  terbinafine (lamiSIL) 250 MG tablet, Take 250 mg by mouth Daily., Disp: , Rfl:     Allergies   Allergen Reactions   • Bee Venom Anaphylaxis   • Gemfibrozil Hives       Family History   Problem Relation Age of Onset   • Lymphoma Brother 42        Non Hodgkins lymphoma   • Stroke Brother    • Heart attack Other         Extensive MI History on Paternal side   • Clotting disorder Sister    • Heart attack Father    • Heart failure Father    • Heart attack Sister    • Heart attack Brother    • Heart disease Brother    • Hypertension Brother    • Heart attack Paternal Aunt    • Heart attack Paternal Uncle        Past Surgical History:   Procedure Laterality Date   • CARDIAC CATHETERIZATION     • COLONOSCOPY N/A 11/5/2021    Procedure: COLONOSCOPY WITH POLYPECTOMY X 5;  Surgeon: Ronaldo Forrester MD;  Location: [facility] ENDOSCOPY;  Service: Gastroenterology;  Laterality: N/A;  POLYP, DIVERTICULOSIS   • CYSTOSCOPY URETEROSCOPY Left 12/10/2018    Dr. Ty   • HAND SURGERY Left     Ganglion cyst removed palm of left hand   • HAND SURGERY  08/21/2019   • REPLACEMENT TOTAL KNEE Bilateral     one in 2001 and the other in 2002   • SEPTOPLASTY      deviated septum       Past Medical History:    Diagnosis Date   • Allergic    • CAD (coronary artery disease)    • CKD (chronic kidney disease), stage III ([facility]) 2018    Dr. West   • Diabetes mellitus ([facility])    • Diverticulosis 10/2016   • DJD (degenerative joint disease)    • DVT, lower extremity, recurrent, left ([facility]) 2017   • Elevated factor VIII level    • Factor 5 Leiden mutation, heterozygous ([facility])    • Hyperlipidemia    • Hypertension    • Pneumonia 2018 and 2018   • Renal calculi 2016   • Sleep apnea     Dr. Grier   • Urinary tract infection        Family History   Problem Relation Age of Onset   • Lymphoma Brother 42        Non Hodgkins lymphoma   • Stroke Brother    • Heart attack Other         Extensive MI History on Paternal side   • Clotting disorder Sister    • Heart attack Father    • Heart failure Father    • Heart attack Sister    • Heart attack Brother    • Heart disease Brother    • Hypertension Brother    • Heart attack Paternal Aunt    • Heart attack Paternal Uncle        Social History     Socioeconomic History   • Marital status:    Tobacco Use   • Smoking status: Former Smoker     Packs/day: 1.50     Years: 2.00     Pack years: 3.00     Types: Cigarettes     Start date: 1978     Quit date: 1980     Years since quittin.1   • Smokeless tobacco: Never Used   • Tobacco comment: smoked one and a half packs of cigarettes a day for two years quitting at age 36   Vaping Use   • Vaping Use: Never used   Substance and Sexual Activity   • Alcohol use: Yes     Alcohol/week: 2.0 standard drinks     Types: 2 Cans of beer per week     Comment: drinks two beers monthly   • Drug use: No   • Sexual activity: Yes     Partners: Female     Birth control/protection: None           ECG 12 Lead    Date/Time: 2022 1:18 PM  Performed by: Divya Gutierrez MD  Authorized by: Divya Gutierrez MD   Comparison: compared with previous ECG   Similar to previous ECG  Comparison to previous  "ECG: Sinus bradycardia nonspecific ST-T wave changes normal axis normal intervals 56/min no ectopy no change from 7/26/2021                Objective:       Physical Exam    /96 (BP Location: Left arm, Patient Position: Sitting, Cuff Size: Large Adult)   Pulse 66   Ht 180.3 cm (71\")   Wt (!) 144 kg (318 lb)   SpO2 97%   BMI 44.35 kg/m²   The patient is alert, oriented and in no distress.    Vital signs as noted above.    Head and neck revealed no carotid bruits or jugular venous distension.  No thyromegaly or lymphadenopathy is present.    Lungs clear.  No wheezing.  Breath sounds are normal bilaterally.    Heart normal first and second heart sounds.  No murmur..  No pericardial rub is present.  No gallop is present.    Abdomen soft and nontender.  No organomegaly is present.    Extremities revealed good peripheral pulses without any pedal edema.    Skin warm and dry.    Musculoskeletal system is grossly normal.    CNS grossly normal.    Reviewed and unchanged from last visit.        "

## 2022-08-11 ENCOUNTER — OFFICE VISIT (OUTPATIENT)
Dept: CARDIOLOGY | Facility: CLINIC | Age: 67
End: 2022-08-11

## 2022-08-11 VITALS
BODY MASS INDEX: 42.7 KG/M2 | HEART RATE: 57 BPM | WEIGHT: 305 LBS | DIASTOLIC BLOOD PRESSURE: 82 MMHG | OXYGEN SATURATION: 98 % | HEIGHT: 71 IN | SYSTOLIC BLOOD PRESSURE: 150 MMHG

## 2022-08-11 DIAGNOSIS — I48.0 PAROXYSMAL ATRIAL FIBRILLATION: Primary | ICD-10-CM

## 2022-08-11 DIAGNOSIS — N18.30 STAGE 3 CHRONIC KIDNEY DISEASE, UNSPECIFIED WHETHER STAGE 3A OR 3B CKD: ICD-10-CM

## 2022-08-11 DIAGNOSIS — Z79.01 LONG TERM (CURRENT) USE OF ANTICOAGULANTS: ICD-10-CM

## 2022-08-11 DIAGNOSIS — I82.402 RECURRENT DEEP VEIN THROMBOSIS (DVT) OF LEFT LOWER EXTREMITY: ICD-10-CM

## 2022-08-11 PROCEDURE — 99214 OFFICE O/P EST MOD 30 MIN: CPT | Performed by: INTERNAL MEDICINE

## 2022-08-11 PROCEDURE — 93000 ELECTROCARDIOGRAM COMPLETE: CPT | Performed by: INTERNAL MEDICINE

## 2022-08-11 RX ORDER — DAPAGLIFLOZIN 5 MG/1
5 TABLET, FILM COATED ORAL DAILY
COMMUNITY

## 2022-08-11 NOTE — PROGRESS NOTES
Encounter Date:08/11/2022  Last seen 2/1/2022      Patient ID: Seth Torres is a 67 y.o. male.    Chief Complaint  Atrial fibrillation  Coronary artery disease  Anticoagulation management  Renal dysfunction        History of Present Illness  Since I have last seen, the patient has been without any chest discomfort ,shortness of breath, palpitations, dizziness or syncope.  Denies having any headache ,abdominal pain ,nausea, vomiting , diarrhea constipation, loss of weight or loss of appetite.  Denies having any excessive bruising ,hematuria or blood in the stool.    Review of all systems negative except as indicated.    Reviewed ROS.    Assessment and Plan      ]]]]]]]]]]]]]]]]]]]]   impression  ==========   -history of atrial fibrillation -converted and maintaining sinus rhythm.    Echocardiogram showed normal left ventricular function without any pericardial effusion.  June 2018.      Patient had pneumonia and febrile illness associated with atrial fibrillatio  -History of pneumonia-left lower lobe seen on chest x-ray as well of conformed by CT scan.      -stable angina pectoris   mild coronary artery disease.  Cardiac catheterization 2012 revealed 50-60% lad disease     -anticoagulation -on Eliquis as long-term treatment for DVT      -Renal dysfunction bun35 cr 2.4 hypertension dyslipidemia      - history of DVT Status post thrombectomy.  Status post IVC filter placement     -Exogenous obesity     -allergic to bee venom lopid  =======  Plan  ==========  History of atrial fibrillation  Patient has converted and maintaining sinus rhythm.  EKG showed sinus rhythm without any ischemic changes.-  8/11/2022     Chronic coronary artery disease  Patient is not having any angina pectoris or congestive heart failure.     Anticoagulation-on Eliquis.  Observe for toxic effects.     Hypertension- 150/80     Dyslipidemia-continue pravastatin     Medications were reviewed and updated.  patient is off amiodarone  Continue  Cardizem and Eliquis  Continue Eliquis as long-term therapy for DVT etc.     Followup in the office in  6 months with EKG    Further plan will depend on patient's progress.  [[[[[[[[[[[[[[[[[[                    Diagnosis Plan   1. Paroxysmal atrial fibrillation (HCC)  ECG 12 Lead   2. Long term (current) use of anticoagulants  ECG 12 Lead   3. Stage 3 chronic kidney disease, unspecified whether stage 3a or 3b CKD (HCC)  ECG 12 Lead   4. Recurrent deep vein thrombosis (DVT) of left lower extremity (HCC)  ECG 12 Lead   LAB RESULTS (LAST 7 DAYS)    CBC        BMP        CMP         BNP        TROPONIN        CoAg        Creatinine Clearance  CrCl cannot be calculated (Patient's most recent lab result is older than the maximum 30 days allowed.).    ABG        Radiology  No radiology results for the last day                The following portions of the patient's history were reviewed and updated as appropriate: allergies, current medications, past family history, past medical history, past social history, past surgical history and problem list.    Review of Systems   Constitutional: Negative for malaise/fatigue.   Cardiovascular: Negative for chest pain, leg swelling, palpitations and syncope.   Respiratory: Negative for shortness of breath.    Skin: Negative for rash.   Gastrointestinal: Negative for nausea and vomiting.   Neurological: Negative for dizziness, light-headedness and numbness.   All other systems reviewed and are negative.        Current Outpatient Medications:   •  allopurinol (ZYLOPRIM) 100 MG tablet, Take 1 tablet by mouth Daily., Disp: , Rfl:   •  apixaban (ELIQUIS) 5 MG tablet tablet, Take 2.5 mg by mouth Every 12 (Twelve) Hours., Disp: , Rfl:   •  cholecalciferol (VITAMIN D3) 25 MCG (1000 UT) tablet, Take 1,000 Units by mouth Daily., Disp: , Rfl:   •  dapagliflozin (Farxiga) 5 MG tablet tablet, Take 5 mg by mouth Daily., Disp: , Rfl:   •  diltiazem XR (DILACOR XR) 240 MG 24 hr capsule, 1 capsule  Daily., Disp: , Rfl:   •  furosemide (LASIX) 40 MG tablet, FUROSEMIDE 40 MG TABS, Disp: , Rfl:   •  hydrALAZINE (APRESOLINE) 25 MG tablet, Take 25 mg by mouth 2 (Two) Times a Day., Disp: , Rfl:   •  isosorbide mononitrate (ISMO,MONOKET) 20 MG tablet, Take 20 mg by mouth Daily., Disp: , Rfl:   •  Magnesium Oxide -Mg Supplement 400 MG capsule, MAGNESIUM 400 MG CAPS, Disp: , Rfl:   •  metFORMIN (FORTAMET) 500 MG (OSM) 24 hr tablet, Daily., Disp: , Rfl:   •  omeprazole (priLOSEC) 40 MG capsule, Take 40 mg by mouth Daily., Disp: , Rfl:   •  potassium chloride (KLOR-CON) 20 MEQ packet, Take 20 mEq by mouth Daily., Disp: , Rfl:   •  pravastatin (PRAVACHOL) 80 MG tablet, Daily., Disp: , Rfl:   •  terbinafine (lamiSIL) 250 MG tablet, Take 250 mg by mouth Daily., Disp: , Rfl:     Allergies   Allergen Reactions   • Bee Venom Anaphylaxis   • Gemfibrozil Hives       Family History   Problem Relation Age of Onset   • Lymphoma Brother 42        Non Hodgkins lymphoma   • Stroke Brother    • Heart attack Other         Extensive MI History on Paternal side   • Clotting disorder Sister    • Heart attack Father    • Heart failure Father    • Heart attack Sister    • Heart disease Sister    • Heart attack Brother    • Heart disease Brother    • Hypertension Brother    • Heart attack Paternal Aunt    • Heart attack Paternal Uncle        Past Surgical History:   Procedure Laterality Date   • CARDIAC CATHETERIZATION     • COLONOSCOPY N/A 11/5/2021    Procedure: COLONOSCOPY WITH POLYPECTOMY X 5;  Surgeon: Ronaldo Forrester MD;  Location: [facility] ENDOSCOPY;  Service: Gastroenterology;  Laterality: N/A;  POLYP, DIVERTICULOSIS   • CYSTOSCOPY URETEROSCOPY Left 12/10/2018    Dr. Ty   • HAND SURGERY Left     Ganglion cyst removed palm of left hand   • HAND SURGERY  08/21/2019   • REPLACEMENT TOTAL KNEE Bilateral     one in 2001 and the other in 2002   • SEPTOPLASTY      deviated septum       Past Medical History:   Diagnosis Date   •  Allergic    • CAD (coronary artery disease)    • CKD (chronic kidney disease), stage III ([facility]) 2018    Dr. West   • Diabetes mellitus ([facility])    • Diverticulosis 10/2016   • DJD (degenerative joint disease)    • DVT, lower extremity, recurrent, left ([facility]) 2017   • Elevated factor VIII level    • Factor 5 Leiden mutation, heterozygous ([facility])    • Hyperlipidemia    • Hypertension    • Pneumonia 2018 and 2018   • Renal calculi 2016   • Sleep apnea     Dr. Grier   • Urinary tract infection        Family History   Problem Relation Age of Onset   • Lymphoma Brother 42        Non Hodgkins lymphoma   • Stroke Brother    • Heart attack Other         Extensive MI History on Paternal side   • Clotting disorder Sister    • Heart attack Father    • Heart failure Father    • Heart attack Sister    • Heart disease Sister    • Heart attack Brother    • Heart disease Brother    • Hypertension Brother    • Heart attack Paternal Aunt    • Heart attack Paternal Uncle        Social History     Socioeconomic History   • Marital status:    Tobacco Use   • Smoking status: Former Smoker     Packs/day: 1.50     Years: 2.00     Pack years: 3.00     Types: Cigarettes     Start date: 1978     Quit date: 1980     Years since quittin.6   • Smokeless tobacco: Never Used   • Tobacco comment: smoked one and a half packs of cigarettes a day for two years quitting at age 36   Vaping Use   • Vaping Use: Never used   Substance and Sexual Activity   • Alcohol use: Yes     Alcohol/week: 2.0 standard drinks     Types: 2 Cans of beer per week     Comment: drinks two beers monthly   • Drug use: No   • Sexual activity: Yes     Partners: Female     Birth control/protection: None           ECG 12 Lead    Date/Time: 2022 10:50 AM  Performed by: Divya Gutierrez MD  Authorized by: Divya Gutierrez MD   Comparison: compared with previous ECG   Similar to previous ECG  Comparison to  "previous ECG: Sinus bradycardia 56/min normal axis normal intervals no ectopy no change from 2/1/2022                Objective:       Physical Exam    /82 (BP Location: Left arm, Patient Position: Sitting, Cuff Size: Large Adult)   Pulse 57   Ht 180.3 cm (71\")   Wt (!) 138 kg (305 lb)   SpO2 98%   BMI 42.54 kg/m²   The patient is alert, oriented and in no distress.    Vital signs as noted above.  Exogenous obesity (BMI 43)    Head and neck revealed no carotid bruits or jugular venous distension.  No thyromegaly or lymphadenopathy is present.    Lungs clear.  No wheezing.  Breath sounds are normal bilaterally.    Heart normal first and second heart sounds.  No murmur..  No pericardial rub is present.  No gallop is present.    Abdomen soft and nontender.  No organomegaly is present.    Extremities revealed good peripheral pulses without any pedal edema.    Skin warm and dry.    Musculoskeletal system is grossly normal.    CNS grossly normal.    Reviewed and updated.        "

## 2022-10-03 ENCOUNTER — HOSPITAL ENCOUNTER (OUTPATIENT)
Dept: CARDIOLOGY | Facility: HOSPITAL | Age: 67
Discharge: HOME | End: 2022-10-03

## 2022-10-03 ENCOUNTER — LAB (OUTPATIENT)
Dept: LAB | Facility: HOSPITAL | Age: 67
End: 2022-10-03

## 2022-10-03 ENCOUNTER — TRANSCRIBE ORDERS (OUTPATIENT)
Dept: ADMINISTRATIVE | Facility: HOSPITAL | Age: 67
End: 2022-10-03

## 2022-10-03 ENCOUNTER — HOSPITAL ENCOUNTER (OUTPATIENT)
Dept: GENERAL RADIOLOGY | Facility: HOSPITAL | Age: 67
Discharge: HOME | End: 2022-10-03

## 2022-10-03 DIAGNOSIS — M48.02 SPINAL STENOSIS IN CERVICAL REGION: ICD-10-CM

## 2022-10-03 DIAGNOSIS — M48.02 SPINAL STENOSIS IN CERVICAL REGION: Primary | ICD-10-CM

## 2022-10-03 LAB
ALBUMIN SERPL-MCNC: 4.6 G/DL (ref 3.5–5.2)
ALBUMIN/GLOB SERPL: 1.9 G/DL
ALP SERPL-CCNC: 95 U/L (ref 39–117)
ALT SERPL W P-5'-P-CCNC: 33 U/L (ref 1–41)
ANION GAP SERPL CALCULATED.3IONS-SCNC: 11.1 MMOL/L (ref 5–15)
APTT PPP: 30.4 SECONDS (ref 24–31)
AST SERPL-CCNC: 20 U/L (ref 1–40)
BASOPHILS # BLD AUTO: 0.04 10*3/MM3 (ref 0–0.2)
BASOPHILS NFR BLD AUTO: 0.6 % (ref 0–1.5)
BILIRUB SERPL-MCNC: 0.6 MG/DL (ref 0–1.2)
BILIRUB UR QL STRIP: NEGATIVE
BUN SERPL-MCNC: 20 MG/DL (ref 8–23)
BUN/CREAT SERPL: 16 (ref 7–25)
CALCIUM SPEC-SCNC: 9.2 MG/DL (ref 8.6–10.5)
CHLORIDE SERPL-SCNC: 98 MMOL/L (ref 98–107)
CLARITY UR: CLEAR
CO2 SERPL-SCNC: 26.9 MMOL/L (ref 22–29)
COLOR UR: YELLOW
CREAT SERPL-MCNC: 1.25 MG/DL (ref 0.76–1.27)
DEPRECATED RDW RBC AUTO: 41.3 FL (ref 37–54)
EGFRCR SERPLBLD CKD-EPI 2021: 63.5 ML/MIN/1.73
EOSINOPHIL # BLD AUTO: 0.13 10*3/MM3 (ref 0–0.4)
EOSINOPHIL NFR BLD AUTO: 1.8 % (ref 0.3–6.2)
ERYTHROCYTE [DISTWIDTH] IN BLOOD BY AUTOMATED COUNT: 13.8 % (ref 12.3–15.4)
GLOBULIN UR ELPH-MCNC: 2.4 GM/DL
GLUCOSE SERPL-MCNC: 188 MG/DL (ref 65–99)
GLUCOSE UR STRIP-MCNC: ABNORMAL MG/DL
HCT VFR BLD AUTO: 54.5 % (ref 37.5–51)
HGB BLD-MCNC: 17.6 G/DL (ref 13–17.7)
HGB UR QL STRIP.AUTO: NEGATIVE
IMM GRANULOCYTES # BLD AUTO: 0.02 10*3/MM3 (ref 0–0.05)
IMM GRANULOCYTES NFR BLD AUTO: 0.3 % (ref 0–0.5)
INR PPP: 1.03 (ref 0.93–1.1)
KETONES UR QL STRIP: NEGATIVE
LEUKOCYTE ESTERASE UR QL STRIP.AUTO: NEGATIVE
LYMPHOCYTES # BLD AUTO: 2.29 10*3/MM3 (ref 0.7–3.1)
LYMPHOCYTES NFR BLD AUTO: 32 % (ref 19.6–45.3)
MCH RBC QN AUTO: 27.5 PG (ref 26.6–33)
MCHC RBC AUTO-ENTMCNC: 32.3 G/DL (ref 31.5–35.7)
MCV RBC AUTO: 85.3 FL (ref 79–97)
MONOCYTES # BLD AUTO: 0.55 10*3/MM3 (ref 0.1–0.9)
MONOCYTES NFR BLD AUTO: 7.7 % (ref 5–12)
NEUTROPHILS NFR BLD AUTO: 4.12 10*3/MM3 (ref 1.7–7)
NEUTROPHILS NFR BLD AUTO: 57.6 % (ref 42.7–76)
NITRITE UR QL STRIP: NEGATIVE
NRBC BLD AUTO-RTO: 0 /100 WBC (ref 0–0.2)
PH UR STRIP.AUTO: 6.5 [PH] (ref 5–8)
PLATELET # BLD AUTO: 195 10*3/MM3 (ref 140–450)
PMV BLD AUTO: 11 FL (ref 6–12)
POTASSIUM SERPL-SCNC: 4.2 MMOL/L (ref 3.5–5.2)
PROT SERPL-MCNC: 7 G/DL (ref 6–8.5)
PROT UR QL STRIP: NEGATIVE
PROTHROMBIN TIME: 10.6 SECONDS (ref 9.6–11.7)
RBC # BLD AUTO: 6.39 10*6/MM3 (ref 4.14–5.8)
SODIUM SERPL-SCNC: 136 MMOL/L (ref 136–145)
SP GR UR STRIP: 1.01 (ref 1–1.03)
UROBILINOGEN UR QL STRIP: ABNORMAL
WBC NRBC COR # BLD: 7.15 10*3/MM3 (ref 3.4–10.8)

## 2022-10-03 PROCEDURE — 71046 X-RAY EXAM CHEST 2 VIEWS: CPT

## 2022-10-03 PROCEDURE — 85025 COMPLETE CBC W/AUTO DIFF WBC: CPT

## 2022-10-03 PROCEDURE — 81003 URINALYSIS AUTO W/O SCOPE: CPT

## 2022-10-03 PROCEDURE — 93005 ELECTROCARDIOGRAM TRACING: CPT | Performed by: NEUROLOGICAL SURGERY

## 2022-10-03 PROCEDURE — 36415 COLL VENOUS BLD VENIPUNCTURE: CPT

## 2022-10-03 PROCEDURE — 85610 PROTHROMBIN TIME: CPT

## 2022-10-03 PROCEDURE — 93010 ELECTROCARDIOGRAM REPORT: CPT | Performed by: INTERNAL MEDICINE

## 2022-10-03 PROCEDURE — 85730 THROMBOPLASTIN TIME PARTIAL: CPT

## 2022-10-03 PROCEDURE — 80053 COMPREHEN METABOLIC PANEL: CPT

## 2022-10-06 LAB — QT INTERVAL: 406 MS

## 2022-10-18 ENCOUNTER — LAB (OUTPATIENT)
Dept: LAB | Facility: HOSPITAL | Age: 67
End: 2022-10-18

## 2022-10-18 LAB
ANION GAP SERPL CALCULATED.3IONS-SCNC: 12.8 MMOL/L (ref 5–15)
APTT PPP: 29.6 SECONDS (ref 24–31)
BUN SERPL-MCNC: 17 MG/DL (ref 8–23)
BUN/CREAT SERPL: 13 (ref 7–25)
CALCIUM SPEC-SCNC: 9.5 MG/DL (ref 8.6–10.5)
CHLORIDE SERPL-SCNC: 103 MMOL/L (ref 98–107)
CO2 SERPL-SCNC: 25.2 MMOL/L (ref 22–29)
CREAT SERPL-MCNC: 1.31 MG/DL (ref 0.76–1.27)
DEPRECATED RDW RBC AUTO: 39.4 FL (ref 37–54)
EGFRCR SERPLBLD CKD-EPI 2021: 60 ML/MIN/1.73
ERYTHROCYTE [DISTWIDTH] IN BLOOD BY AUTOMATED COUNT: 14 % (ref 12.3–15.4)
GLUCOSE SERPL-MCNC: 149 MG/DL (ref 65–99)
HCT VFR BLD AUTO: 52.5 % (ref 37.5–51)
HGB BLD-MCNC: 17.8 G/DL (ref 13–17.7)
INR PPP: 1.01 (ref 0.93–1.1)
MCH RBC QN AUTO: 27.9 PG (ref 26.6–33)
MCHC RBC AUTO-ENTMCNC: 33.9 G/DL (ref 31.5–35.7)
MCV RBC AUTO: 82.2 FL (ref 79–97)
PLATELET # BLD AUTO: 190 10*3/MM3 (ref 140–450)
PMV BLD AUTO: 11.4 FL (ref 6–12)
POTASSIUM SERPL-SCNC: 4.7 MMOL/L (ref 3.5–5.2)
PROTHROMBIN TIME: 10.4 SECONDS (ref 9.6–11.7)
RBC # BLD AUTO: 6.39 10*6/MM3 (ref 4.14–5.8)
SODIUM SERPL-SCNC: 141 MMOL/L (ref 136–145)
WBC NRBC COR # BLD: 6 10*3/MM3 (ref 3.4–10.8)

## 2022-10-18 PROCEDURE — 85027 COMPLETE CBC AUTOMATED: CPT

## 2022-10-18 PROCEDURE — 80048 BASIC METABOLIC PNL TOTAL CA: CPT

## 2022-10-18 PROCEDURE — 85610 PROTHROMBIN TIME: CPT

## 2022-10-18 PROCEDURE — 85730 THROMBOPLASTIN TIME PARTIAL: CPT

## 2022-10-27 ENCOUNTER — APPOINTMENT (OUTPATIENT)
Dept: OTHER | Facility: HOSPITAL | Age: 67
End: 2022-10-27

## 2022-10-27 ENCOUNTER — ANESTHESIA EVENT (OUTPATIENT)
Dept: PERIOP | Facility: HOSPITAL | Age: 67
End: 2022-10-27

## 2022-10-27 ENCOUNTER — APPOINTMENT (OUTPATIENT)
Dept: GENERAL RADIOLOGY | Facility: HOSPITAL | Age: 67
End: 2022-10-27

## 2022-10-27 ENCOUNTER — ANESTHESIA (OUTPATIENT)
Dept: PERIOP | Facility: HOSPITAL | Age: 67
End: 2022-10-27

## 2022-10-27 ENCOUNTER — HOSPITAL ENCOUNTER (OUTPATIENT)
Facility: HOSPITAL | Age: 67
Discharge: HOME | End: 2022-10-28
Attending: NEUROLOGICAL SURGERY | Admitting: NEUROLOGICAL SURGERY

## 2022-10-27 DIAGNOSIS — Z00.6 EXAMINATION FOR NORMAL COMPARISON OR CONTROL IN CLINICAL RESEARCH: ICD-10-CM

## 2022-10-27 PROBLEM — M48.02 CERVICAL STENOSIS OF SPINAL CANAL: Status: ACTIVE | Noted: 2022-10-27

## 2022-10-27 LAB
ABO GROUP BLD: NORMAL
BLD GP AB SCN SERPL QL: NEGATIVE
GLUCOSE BLDC GLUCOMTR-MCNC: 138 MG/DL (ref 70–105)
GLUCOSE BLDC GLUCOMTR-MCNC: 144 MG/DL (ref 70–105)
GLUCOSE BLDC GLUCOMTR-MCNC: 187 MG/DL (ref 70–105)
GLUCOSE BLDC GLUCOMTR-MCNC: 188 MG/DL (ref 70–105)
RH BLD: NEGATIVE
T&S EXPIRATION DATE: NORMAL

## 2022-10-27 PROCEDURE — 25010000002 VANCOMYCIN 1 G RECONSTITUTED SOLUTION 1 EACH VIAL: Performed by: NEUROLOGICAL SURGERY

## 2022-10-27 PROCEDURE — 86901 BLOOD TYPING SEROLOGIC RH(D): CPT

## 2022-10-27 PROCEDURE — 86900 BLOOD TYPING SEROLOGIC ABO: CPT

## 2022-10-27 PROCEDURE — C1713 ANCHOR/SCREW BN/BN,TIS/BN: HCPCS | Performed by: NEUROLOGICAL SURGERY

## 2022-10-27 PROCEDURE — 82962 GLUCOSE BLOOD TEST: CPT

## 2022-10-27 PROCEDURE — 25010000002 FENTANYL CITRATE (PF) 100 MCG/2ML SOLUTION: Performed by: ANESTHESIOLOGIST ASSISTANT

## 2022-10-27 PROCEDURE — 25010000002 HYDROMORPHONE 1 MG/ML SOLUTION: Performed by: ANESTHESIOLOGIST ASSISTANT

## 2022-10-27 PROCEDURE — 25010000002 ONDANSETRON PER 1 MG: Performed by: ANESTHESIOLOGIST ASSISTANT

## 2022-10-27 PROCEDURE — 25010000002 SUCCINYLCHOLINE PER 20 MG: Performed by: ANESTHESIOLOGIST ASSISTANT

## 2022-10-27 PROCEDURE — G0378 HOSPITAL OBSERVATION PER HR: HCPCS

## 2022-10-27 PROCEDURE — 72040 X-RAY EXAM NECK SPINE 2-3 VW: CPT

## 2022-10-27 PROCEDURE — 86850 RBC ANTIBODY SCREEN: CPT | Performed by: NEUROLOGICAL SURGERY

## 2022-10-27 PROCEDURE — 76000 FLUOROSCOPY <1 HR PHYS/QHP: CPT

## 2022-10-27 PROCEDURE — 25010000002 DEXAMETHASONE SODIUM PHOSPHATE 20 MG/5ML SOLUTION: Performed by: ANESTHESIOLOGIST ASSISTANT

## 2022-10-27 PROCEDURE — 25010000002 HYDROMORPHONE PER 4 MG: Performed by: ANESTHESIOLOGIST ASSISTANT

## 2022-10-27 PROCEDURE — 25010000002 PROPOFOL 10 MG/ML EMULSION: Performed by: ANESTHESIOLOGIST ASSISTANT

## 2022-10-27 PROCEDURE — 0 LIDOCAINE 1 % SOLUTION: Performed by: ANESTHESIOLOGIST ASSISTANT

## 2022-10-27 PROCEDURE — 86901 BLOOD TYPING SEROLOGIC RH(D): CPT | Performed by: NEUROLOGICAL SURGERY

## 2022-10-27 PROCEDURE — 86900 BLOOD TYPING SEROLOGIC ABO: CPT | Performed by: NEUROLOGICAL SURGERY

## 2022-10-27 PROCEDURE — 25010000002 MIDAZOLAM PER 1 MG: Performed by: ANESTHESIOLOGIST ASSISTANT

## 2022-10-27 DEVICE — HEMOST ABS SURGIFOAM SZ100 8X12 10MM: Type: IMPLANTABLE DEVICE | Site: SPINE CERVICAL | Status: FUNCTIONAL

## 2022-10-27 DEVICE — PLATE 7200045 ATL VISION ELITE 45MM
Type: IMPLANTABLE DEVICE | Site: SPINE CERVICAL | Status: FUNCTIONAL
Brand: ATLANTIS® ANTERIOR CERVICAL PLATE SYSTEM

## 2022-10-27 DEVICE — INTERBODY FUSION DEVICE NANOLOCK SURFACE TECHNOLOGY 6 DEGREE MEDIUM 7MM
Type: IMPLANTABLE DEVICE | Site: SPINE CERVICAL | Status: FUNCTIONAL
Brand: ENDOSKELETON TC

## 2022-10-27 DEVICE — FLOSEAL HEMOSTATIC MATRIX, 10ML
Type: IMPLANTABLE DEVICE | Site: SPINE CERVICAL | Status: FUNCTIONAL
Brand: FLOSEAL HEMOSTATIC MATRIX

## 2022-10-27 RX ORDER — SODIUM CHLORIDE, SODIUM LACTATE, POTASSIUM CHLORIDE, CALCIUM CHLORIDE 600; 310; 30; 20 MG/100ML; MG/100ML; MG/100ML; MG/100ML
INJECTION, SOLUTION INTRAVENOUS CONTINUOUS PRN
Status: DISCONTINUED | OUTPATIENT
Start: 2022-10-27 | End: 2022-10-27 | Stop reason: SURG

## 2022-10-27 RX ORDER — HYDROCODONE BITARTRATE AND ACETAMINOPHEN 5; 325 MG/1; MG/1
1 TABLET ORAL ONCE AS NEEDED
Status: DISCONTINUED | OUTPATIENT
Start: 2022-10-27 | End: 2022-10-27 | Stop reason: HOSPADM

## 2022-10-27 RX ORDER — ONDANSETRON 2 MG/ML
INJECTION INTRAMUSCULAR; INTRAVENOUS AS NEEDED
Status: DISCONTINUED | OUTPATIENT
Start: 2022-10-27 | End: 2022-10-27 | Stop reason: SURG

## 2022-10-27 RX ORDER — FENTANYL CITRATE 50 UG/ML
25 INJECTION, SOLUTION INTRAMUSCULAR; INTRAVENOUS
Status: DISCONTINUED | OUTPATIENT
Start: 2022-10-27 | End: 2022-10-27 | Stop reason: HOSPADM

## 2022-10-27 RX ORDER — CEFAZOLIN SODIUM IN 0.9 % NACL 3 G/100 ML
3 INTRAVENOUS SOLUTION, PIGGYBACK (ML) INTRAVENOUS ONCE
Status: COMPLETED | OUTPATIENT
Start: 2022-10-27 | End: 2022-10-27

## 2022-10-27 RX ORDER — MELATONIN
1000 DAILY
Status: DISCONTINUED | OUTPATIENT
Start: 2022-10-27 | End: 2022-10-28 | Stop reason: HOSPADM

## 2022-10-27 RX ORDER — FENTANYL CITRATE 50 UG/ML
50 INJECTION, SOLUTION INTRAMUSCULAR; INTRAVENOUS
Status: DISCONTINUED | OUTPATIENT
Start: 2022-10-27 | End: 2022-10-27 | Stop reason: HOSPADM

## 2022-10-27 RX ORDER — HYDROCODONE BITARTRATE AND ACETAMINOPHEN 10; 325 MG/1; MG/1
1 TABLET ORAL EVERY 4 HOURS PRN
Status: DISCONTINUED | OUTPATIENT
Start: 2022-10-27 | End: 2022-10-28 | Stop reason: HOSPADM

## 2022-10-27 RX ORDER — SODIUM CHLORIDE, SODIUM LACTATE, POTASSIUM CHLORIDE, CALCIUM CHLORIDE 600; 310; 30; 20 MG/100ML; MG/100ML; MG/100ML; MG/100ML
1000 INJECTION, SOLUTION INTRAVENOUS CONTINUOUS
Status: DISCONTINUED | OUTPATIENT
Start: 2022-10-27 | End: 2022-10-28 | Stop reason: HOSPADM

## 2022-10-27 RX ORDER — SODIUM CHLORIDE 0.9 % (FLUSH) 0.9 %
10 SYRINGE (ML) INJECTION AS NEEDED
Status: DISCONTINUED | OUTPATIENT
Start: 2022-10-27 | End: 2022-10-27 | Stop reason: HOSPADM

## 2022-10-27 RX ORDER — HYDROMORPHONE HCL 110MG/55ML
PATIENT CONTROLLED ANALGESIA SYRINGE INTRAVENOUS AS NEEDED
Status: DISCONTINUED | OUTPATIENT
Start: 2022-10-27 | End: 2022-10-27 | Stop reason: SURG

## 2022-10-27 RX ORDER — PANTOPRAZOLE SODIUM 40 MG/1
40 TABLET, DELAYED RELEASE ORAL EVERY MORNING
Status: DISCONTINUED | OUTPATIENT
Start: 2022-10-28 | End: 2022-10-28 | Stop reason: HOSPADM

## 2022-10-27 RX ORDER — ALLOPURINOL 100 MG/1
100 TABLET ORAL DAILY
Status: DISCONTINUED | OUTPATIENT
Start: 2022-10-27 | End: 2022-10-28 | Stop reason: HOSPADM

## 2022-10-27 RX ORDER — DILTIAZEM HYDROCHLORIDE 120 MG/1
120 CAPSULE, COATED, EXTENDED RELEASE ORAL
Status: DISCONTINUED | OUTPATIENT
Start: 2022-10-28 | End: 2022-10-28 | Stop reason: HOSPADM

## 2022-10-27 RX ORDER — HYDRALAZINE HYDROCHLORIDE 20 MG/ML
5 INJECTION INTRAMUSCULAR; INTRAVENOUS
Status: DISCONTINUED | OUTPATIENT
Start: 2022-10-27 | End: 2022-10-27 | Stop reason: HOSPADM

## 2022-10-27 RX ORDER — SODIUM CHLORIDE 0.9 % (FLUSH) 0.9 %
10 SYRINGE (ML) INJECTION AS NEEDED
Status: DISCONTINUED | OUTPATIENT
Start: 2022-10-27 | End: 2022-10-28 | Stop reason: HOSPADM

## 2022-10-27 RX ORDER — ACETAMINOPHEN 500 MG
1000 TABLET ORAL ONCE
Status: COMPLETED | OUTPATIENT
Start: 2022-10-27 | End: 2022-10-27

## 2022-10-27 RX ORDER — ATORVASTATIN CALCIUM 20 MG/1
20 TABLET, FILM COATED ORAL NIGHTLY
Status: DISCONTINUED | OUTPATIENT
Start: 2022-10-27 | End: 2022-10-28 | Stop reason: HOSPADM

## 2022-10-27 RX ORDER — FLUMAZENIL 0.1 MG/ML
0.2 INJECTION INTRAVENOUS AS NEEDED
Status: DISCONTINUED | OUTPATIENT
Start: 2022-10-27 | End: 2022-10-27 | Stop reason: HOSPADM

## 2022-10-27 RX ORDER — PROMETHAZINE HYDROCHLORIDE 12.5 MG/1
12.5 TABLET ORAL EVERY 6 HOURS PRN
Status: DISCONTINUED | OUTPATIENT
Start: 2022-10-27 | End: 2022-10-28 | Stop reason: HOSPADM

## 2022-10-27 RX ORDER — LIDOCAINE HYDROCHLORIDE AND EPINEPHRINE BITARTRATE 20; .01 MG/ML; MG/ML
INJECTION, SOLUTION SUBCUTANEOUS AS NEEDED
Status: DISCONTINUED | OUTPATIENT
Start: 2022-10-27 | End: 2022-10-27 | Stop reason: HOSPADM

## 2022-10-27 RX ORDER — CYCLOBENZAPRINE HCL 10 MG
10 TABLET ORAL 3 TIMES DAILY PRN
Status: DISCONTINUED | OUTPATIENT
Start: 2022-10-27 | End: 2022-10-28 | Stop reason: HOSPADM

## 2022-10-27 RX ORDER — FUROSEMIDE 40 MG/1
40 TABLET ORAL DAILY
Status: DISCONTINUED | OUTPATIENT
Start: 2022-10-27 | End: 2022-10-28 | Stop reason: HOSPADM

## 2022-10-27 RX ORDER — MEPERIDINE HYDROCHLORIDE 25 MG/ML
12.5 INJECTION INTRAMUSCULAR; INTRAVENOUS; SUBCUTANEOUS
Status: DISCONTINUED | OUTPATIENT
Start: 2022-10-27 | End: 2022-10-27 | Stop reason: HOSPADM

## 2022-10-27 RX ORDER — LABETALOL HYDROCHLORIDE 5 MG/ML
5 INJECTION, SOLUTION INTRAVENOUS
Status: DISCONTINUED | OUTPATIENT
Start: 2022-10-27 | End: 2022-10-27 | Stop reason: HOSPADM

## 2022-10-27 RX ORDER — LIDOCAINE HYDROCHLORIDE 10 MG/ML
INJECTION, SOLUTION INFILTRATION; PERINEURAL AS NEEDED
Status: DISCONTINUED | OUTPATIENT
Start: 2022-10-27 | End: 2022-10-27 | Stop reason: SURG

## 2022-10-27 RX ORDER — GLYCOPYRROLATE 0.2 MG/ML
INJECTION INTRAMUSCULAR; INTRAVENOUS AS NEEDED
Status: DISCONTINUED | OUTPATIENT
Start: 2022-10-27 | End: 2022-10-27 | Stop reason: SURG

## 2022-10-27 RX ORDER — MIDAZOLAM HYDROCHLORIDE 1 MG/ML
0.5 INJECTION INTRAMUSCULAR; INTRAVENOUS
Status: DISCONTINUED | OUTPATIENT
Start: 2022-10-27 | End: 2022-10-27 | Stop reason: HOSPADM

## 2022-10-27 RX ORDER — PROPOFOL 10 MG/ML
VIAL (ML) INTRAVENOUS AS NEEDED
Status: DISCONTINUED | OUTPATIENT
Start: 2022-10-27 | End: 2022-10-27 | Stop reason: SURG

## 2022-10-27 RX ORDER — ONDANSETRON 2 MG/ML
4 INJECTION INTRAMUSCULAR; INTRAVENOUS EVERY 6 HOURS PRN
Status: DISCONTINUED | OUTPATIENT
Start: 2022-10-27 | End: 2022-10-28 | Stop reason: HOSPADM

## 2022-10-27 RX ORDER — MIDAZOLAM HYDROCHLORIDE 1 MG/ML
INJECTION INTRAMUSCULAR; INTRAVENOUS AS NEEDED
Status: DISCONTINUED | OUTPATIENT
Start: 2022-10-27 | End: 2022-10-27 | Stop reason: SURG

## 2022-10-27 RX ORDER — HYDRALAZINE HYDROCHLORIDE 25 MG/1
25 TABLET, FILM COATED ORAL 2 TIMES DAILY
Status: DISCONTINUED | OUTPATIENT
Start: 2022-10-27 | End: 2022-10-28 | Stop reason: HOSPADM

## 2022-10-27 RX ORDER — METFORMIN HYDROCHLORIDE 500 MG/1
500 TABLET, EXTENDED RELEASE ORAL
Status: DISCONTINUED | OUTPATIENT
Start: 2022-10-28 | End: 2022-10-28 | Stop reason: HOSPADM

## 2022-10-27 RX ORDER — ONDANSETRON 4 MG/1
4 TABLET, FILM COATED ORAL EVERY 6 HOURS PRN
Status: DISCONTINUED | OUTPATIENT
Start: 2022-10-27 | End: 2022-10-28 | Stop reason: HOSPADM

## 2022-10-27 RX ORDER — SODIUM CHLORIDE 0.9 % (FLUSH) 0.9 %
10 SYRINGE (ML) INJECTION EVERY 12 HOURS SCHEDULED
Status: DISCONTINUED | OUTPATIENT
Start: 2022-10-27 | End: 2022-10-27 | Stop reason: HOSPADM

## 2022-10-27 RX ORDER — OXYCODONE HYDROCHLORIDE 5 MG/1
10 TABLET ORAL ONCE
Status: COMPLETED | OUTPATIENT
Start: 2022-10-27 | End: 2022-10-27

## 2022-10-27 RX ORDER — DEXAMETHASONE SODIUM PHOSPHATE 4 MG/ML
INJECTION, SOLUTION INTRA-ARTICULAR; INTRALESIONAL; INTRAMUSCULAR; INTRAVENOUS; SOFT TISSUE AS NEEDED
Status: DISCONTINUED | OUTPATIENT
Start: 2022-10-27 | End: 2022-10-27 | Stop reason: SURG

## 2022-10-27 RX ORDER — SUCCINYLCHOLINE CHLORIDE 20 MG/ML
INJECTION INTRAMUSCULAR; INTRAVENOUS AS NEEDED
Status: DISCONTINUED | OUTPATIENT
Start: 2022-10-27 | End: 2022-10-27 | Stop reason: SURG

## 2022-10-27 RX ORDER — POTASSIUM CHLORIDE 1.5 G/1.77G
20 POWDER, FOR SOLUTION ORAL DAILY
Status: DISCONTINUED | OUTPATIENT
Start: 2022-10-28 | End: 2022-10-28 | Stop reason: HOSPADM

## 2022-10-27 RX ORDER — ISOSORBIDE MONONITRATE 20 MG/1
20 TABLET ORAL NIGHTLY
Status: DISCONTINUED | OUTPATIENT
Start: 2022-10-27 | End: 2022-10-28 | Stop reason: HOSPADM

## 2022-10-27 RX ORDER — FENTANYL CITRATE 50 UG/ML
INJECTION, SOLUTION INTRAMUSCULAR; INTRAVENOUS AS NEEDED
Status: DISCONTINUED | OUTPATIENT
Start: 2022-10-27 | End: 2022-10-27 | Stop reason: SURG

## 2022-10-27 RX ORDER — KETAMINE HCL IN NACL, ISO-OSM 100MG/10ML
SYRINGE (ML) INJECTION AS NEEDED
Status: DISCONTINUED | OUTPATIENT
Start: 2022-10-27 | End: 2022-10-27 | Stop reason: SURG

## 2022-10-27 RX ORDER — LIDOCAINE HYDROCHLORIDE 10 MG/ML
0.5 INJECTION, SOLUTION INFILTRATION; PERINEURAL ONCE AS NEEDED
Status: DISCONTINUED | OUTPATIENT
Start: 2022-10-27 | End: 2022-10-27 | Stop reason: HOSPADM

## 2022-10-27 RX ORDER — SODIUM CHLORIDE 0.9 % (FLUSH) 0.9 %
10 SYRINGE (ML) INJECTION EVERY 12 HOURS SCHEDULED
Status: DISCONTINUED | OUTPATIENT
Start: 2022-10-27 | End: 2022-10-28 | Stop reason: HOSPADM

## 2022-10-27 RX ORDER — NALOXONE HCL 0.4 MG/ML
0.4 VIAL (ML) INJECTION AS NEEDED
Status: DISCONTINUED | OUTPATIENT
Start: 2022-10-27 | End: 2022-10-27 | Stop reason: HOSPADM

## 2022-10-27 RX ORDER — IPRATROPIUM BROMIDE AND ALBUTEROL SULFATE 2.5; .5 MG/3ML; MG/3ML
3 SOLUTION RESPIRATORY (INHALATION) ONCE AS NEEDED
Status: DISCONTINUED | OUTPATIENT
Start: 2022-10-27 | End: 2022-10-27 | Stop reason: HOSPADM

## 2022-10-27 RX ORDER — ONDANSETRON 2 MG/ML
4 INJECTION INTRAMUSCULAR; INTRAVENOUS ONCE AS NEEDED
Status: DISCONTINUED | OUTPATIENT
Start: 2022-10-27 | End: 2022-10-27 | Stop reason: HOSPADM

## 2022-10-27 RX ADMIN — ACETAMINOPHEN 1000 MG: 500 TABLET ORAL at 07:04

## 2022-10-27 RX ADMIN — ISOSORBIDE MONONITRATE 20 MG: 20 TABLET ORAL at 20:09

## 2022-10-27 RX ADMIN — REMIFENTANIL HYDROCHLORIDE 0.05 MCG/KG/MIN: 1 INJECTION, POWDER, LYOPHILIZED, FOR SOLUTION INTRAVENOUS at 08:17

## 2022-10-27 RX ADMIN — Medication 20 MG: at 08:08

## 2022-10-27 RX ADMIN — HYDROCODONE BITARTRATE AND ACETAMINOPHEN 1 TABLET: 10; 325 TABLET ORAL at 17:13

## 2022-10-27 RX ADMIN — Medication 10 MG: at 09:14

## 2022-10-27 RX ADMIN — PROPOFOL INJECTABLE EMULSION 135 MCG/KG/MIN: 10 INJECTION, EMULSION INTRAVENOUS at 08:51

## 2022-10-27 RX ADMIN — HYDROMORPHONE HYDROCHLORIDE 0.5 MG: 1 INJECTION, SOLUTION INTRAMUSCULAR; INTRAVENOUS; SUBCUTANEOUS at 12:18

## 2022-10-27 RX ADMIN — GLYCOPYRROLATE 0.2 MG: 0.2 INJECTION INTRAMUSCULAR; INTRAVENOUS at 08:39

## 2022-10-27 RX ADMIN — SUCCINYLCHOLINE CHLORIDE 100 MG: 20 INJECTION, SOLUTION INTRAMUSCULAR; INTRAVENOUS at 08:08

## 2022-10-27 RX ADMIN — LIDOCAINE HYDROCHLORIDE 80 MG: 10 INJECTION, SOLUTION INFILTRATION; PERINEURAL at 08:08

## 2022-10-27 RX ADMIN — PROPOFOL INJECTABLE EMULSION 150 MCG/KG/MIN: 10 INJECTION, EMULSION INTRAVENOUS at 08:17

## 2022-10-27 RX ADMIN — PROPOFOL INJECTABLE EMULSION 150 MG: 10 INJECTION, EMULSION INTRAVENOUS at 08:08

## 2022-10-27 RX ADMIN — ALLOPURINOL 100 MG: 100 TABLET ORAL at 14:24

## 2022-10-27 RX ADMIN — HYDROCODONE BITARTRATE AND ACETAMINOPHEN 1 TABLET: 10; 325 TABLET ORAL at 21:40

## 2022-10-27 RX ADMIN — FENTANYL CITRATE 50 MCG: 50 INJECTION, SOLUTION INTRAMUSCULAR; INTRAVENOUS at 08:08

## 2022-10-27 RX ADMIN — HYDROMORPHONE HYDROCHLORIDE 1 MG: 2 INJECTION, SOLUTION INTRAMUSCULAR; INTRAVENOUS; SUBCUTANEOUS at 10:48

## 2022-10-27 RX ADMIN — HYDROMORPHONE HYDROCHLORIDE 0.5 MG: 2 INJECTION, SOLUTION INTRAMUSCULAR; INTRAVENOUS; SUBCUTANEOUS at 09:20

## 2022-10-27 RX ADMIN — EMPAGLIFLOZIN 10 MG: 10 TABLET, FILM COATED ORAL at 14:36

## 2022-10-27 RX ADMIN — FENTANYL CITRATE 25 MCG: 50 INJECTION, SOLUTION INTRAMUSCULAR; INTRAVENOUS at 09:15

## 2022-10-27 RX ADMIN — SODIUM CHLORIDE, POTASSIUM CHLORIDE, SODIUM LACTATE AND CALCIUM CHLORIDE 1000 ML: 600; 310; 30; 20 INJECTION, SOLUTION INTRAVENOUS at 07:00

## 2022-10-27 RX ADMIN — ONDANSETRON 4 MG: 2 INJECTION INTRAMUSCULAR; INTRAVENOUS at 11:11

## 2022-10-27 RX ADMIN — ATORVASTATIN CALCIUM 20 MG: 20 TABLET, FILM COATED ORAL at 20:09

## 2022-10-27 RX ADMIN — HYDROMORPHONE HYDROCHLORIDE 0.5 MG: 2 INJECTION, SOLUTION INTRAMUSCULAR; INTRAVENOUS; SUBCUTANEOUS at 09:47

## 2022-10-27 RX ADMIN — HYDROMORPHONE HYDROCHLORIDE 0.5 MG: 1 INJECTION, SOLUTION INTRAMUSCULAR; INTRAVENOUS; SUBCUTANEOUS at 12:33

## 2022-10-27 RX ADMIN — MIDAZOLAM HYDROCHLORIDE 2 MG: 1 INJECTION, SOLUTION INTRAMUSCULAR; INTRAVENOUS at 08:07

## 2022-10-27 RX ADMIN — FENTANYL CITRATE 25 MCG: 50 INJECTION, SOLUTION INTRAMUSCULAR; INTRAVENOUS at 09:14

## 2022-10-27 RX ADMIN — PROPOFOL INJECTABLE EMULSION 50 MG: 10 INJECTION, EMULSION INTRAVENOUS at 08:09

## 2022-10-27 RX ADMIN — DEXAMETHASONE SODIUM PHOSPHATE 8 MG: 4 INJECTION, SOLUTION INTRAMUSCULAR; INTRAVENOUS at 08:21

## 2022-10-27 RX ADMIN — SODIUM CHLORIDE, SODIUM LACTATE, POTASSIUM CHLORIDE, AND CALCIUM CHLORIDE: .6; .31; .03; .02 INJECTION, SOLUTION INTRAVENOUS at 08:19

## 2022-10-27 RX ADMIN — PROPOFOL INJECTABLE EMULSION 135 MCG/KG/MIN: 10 INJECTION, EMULSION INTRAVENOUS at 09:43

## 2022-10-27 RX ADMIN — HYDRALAZINE HYDROCHLORIDE 25 MG: 25 TABLET, FILM COATED ORAL at 20:09

## 2022-10-27 RX ADMIN — OXYCODONE 10 MG: 5 TABLET ORAL at 07:04

## 2022-10-27 RX ADMIN — FUROSEMIDE 40 MG: 40 TABLET ORAL at 14:24

## 2022-10-27 RX ADMIN — Medication 1000 UNITS: at 14:24

## 2022-10-27 RX ADMIN — Medication 10 ML: at 20:09

## 2022-10-27 RX ADMIN — Medication 3 G: at 08:17

## 2022-10-27 NOTE — ANESTHESIA PROCEDURE NOTES
Arterial Line      Patient reassessed immediately prior to procedure    Patient location during procedure: OR  Start time: 10/27/2022 8:12 AM   Line placed for hemodynamic monitoring and ABGs/Labs/ISTAT.  Performed By   Anesthesiologist: Bob Branch MD CRNA/LILLI: Lynnette Ojeda CAA   Preanesthetic Checklist  Completed: patient identified, IV checked, site marked, risks and benefits discussed, surgical consent, monitors and equipment checked, pre-op evaluation and timeout performed  Arterial Line Prep    Sterile Tech: mask and gloves  Prep: ChloraPrep  Patient monitoring: blood pressure monitoring, continuous pulse oximetry and EKG  Arterial Line Procedure   Laterality:left  Location:  radial artery  Catheter size: 20 G   Guidance: palpation technique  Number of attempts: 1  Successful placement: yes   Post Assessment   Dressing Type: secured with tape (tegaderm and tape used to secure).   Complications no  Circ/Move/Sens Assessment: unchanged.   Patient Tolerance: patient tolerated the procedure well with no apparent complications

## 2022-10-27 NOTE — ANESTHESIA POSTPROCEDURE EVALUATION
Patient: Seth Torres    Procedure Summary     Date: 10/27/22 Room / Location: [facility] OR  / [facility] MAIN OR    Anesthesia Start: 0805 Anesthesia Stop: 1144    Procedure: REOPERATION ANTERIOR CERVICAL DISCECTOMY AND FUSION C3-4 AND C4-5 WITH INTERBODY SPACER, AUTOGRAFT AND ANTERIOR INSTRUMENTATION AND REMOVAL OF ANTERIOR INSTRUMENTATION C5-6 (Bilateral: Spine Cervical) Diagnosis:       Degenerative cervical spinal stenosis      (Degenerative cervical spinal stenosis [M48.02])    Surgeons: Juan Manuel Rushing MD Provider: Bob Branch MD    Anesthesia Type: general with block, ERAS Protocol ASA Status: 4          Anesthesia Type: general with block, ERAS Protocol    Vitals  Vitals Value Taken Time   /74 10/27/22 1254   Temp 97.5 °F (36.4 °C) 10/27/22 1137   Pulse 65 10/27/22 1254   Resp 12 10/27/22 1251   SpO2 96 % 10/27/22 1254   Vitals shown include unvalidated device data.        Post Anesthesia Care and Evaluation    Patient location during evaluation: PACU  Patient participation: complete - patient participated  Level of consciousness: awake  Pain scale: See nurse's notes for pain score.  Pain management: adequate    Airway patency: patent  Anesthetic complications: No anesthetic complications  PONV Status: none  Cardiovascular status: acceptable  Respiratory status: acceptable  Hydration status: acceptable    Comments: Patient seen and examined postoperatively; vital signs stable; SpO2 greater than or equal to 90%; cardiopulmonary status stable; nausea/vomiting adequately controlled; pain adequately controlled; no apparent anesthesia complications; patient discharged from anesthesia care when discharge criteria were met

## 2022-10-27 NOTE — ANESTHESIA PROCEDURE NOTES
Airway  Urgency: elective    Airway not difficult    General Information and Staff    Patient location during procedure: OR  Anesthesiologist: Bob Branch MD    Indications and Patient Condition  Indications for airway management: airway protection    Preoxygenated: yes  Mask difficulty assessment: 1 - vent by mask    Final Airway Details  Final airway type: endotracheal airway      Successful airway: ETT  Cuffed: yes   Successful intubation technique: direct laryngoscopy  Facilitating devices/methods: intubating stylet and cricoid pressure  Endotracheal tube insertion site: oral  Blade: Dent  Blade size: 4  ETT size (mm): 7.5  Cormack-Lehane Classification: grade IIa - partial view of glottis  Placement verified by: chest auscultation and capnometry   Measured from: teeth  ETT/EBT  to teeth (cm): 23  Number of attempts at approach: 1  Assessment: lips, teeth, and gum same as pre-op and atraumatic intubation

## 2022-10-27 NOTE — ANESTHESIA PROCEDURE NOTES
Peripheral IV    Patient location during procedure: OR  Line placed for Fluids/Medication Admin.  Performed By   Anesthesiologist: Bob Branch MD  CRNA/CAA: Lynnette Ojeda CAA  Preanesthetic Checklist  Completed: patient identified, IV checked, site marked, risks and benefits discussed, surgical consent, monitors and equipment checked, pre-op evaluation and timeout performed  Peripheral IV Prep   Patient position: supine   Prep: ChloraPrep  Patient monitoring: heart rate, cardiac monitor and continuous pulse ox  Peripheral IV Procedure   Laterality:right  Location:  Wrist  Catheter size: 18 G          Post Assessment   Dressing Type: tape and transparent.    IV Dressing/Site: clean, dry and intact

## 2022-10-27 NOTE — ANESTHESIA PREPROCEDURE EVALUATION
Anesthesia Evaluation     Patient summary reviewed and Nursing notes reviewed   NPO Solid Status: > 8 hours  NPO Liquid Status: > 8 hours           Airway   Mallampati: II  TM distance: >3 FB  Neck ROM: full  No difficulty expected  Dental - normal exam     Pulmonary - normal exam   (+) sleep apnea,   Cardiovascular - normal exam    ECG reviewed    (+) hypertension, CAD, dysrhythmias Paroxysmal Atrial Fib, DVT, hyperlipidemia,       Neuro/Psych  GI/Hepatic/Renal/Endo    (+) obesity, morbid obesity,  renal disease, diabetes mellitus,     Musculoskeletal     Abdominal  - normal exam    Bowel sounds: normal.   Substance History      OB/GYN          Other   arthritis, blood dyscrasia,     ROS/Med Hx Other: ITP  Factor  5 leiden def       Sinus rhythm  Probable left atrial enlargement                  Anesthesia Plan    ASA 4     ERAS Protocol, general and Beata   total IV anesthesia  intravenous induction     Anesthetic plan, risks, benefits, and alternatives have been provided, discussed and informed consent has been obtained with: patient.    Plan discussed with CAA.        CODE STATUS:

## 2022-10-28 VITALS
RESPIRATION RATE: 18 BRPM | BODY MASS INDEX: 42.7 KG/M2 | OXYGEN SATURATION: 94 % | WEIGHT: 305 LBS | HEIGHT: 71 IN | TEMPERATURE: 97.9 F | HEART RATE: 66 BPM | SYSTOLIC BLOOD PRESSURE: 165 MMHG | DIASTOLIC BLOOD PRESSURE: 77 MMHG

## 2022-10-28 LAB
GLUCOSE BLDC GLUCOMTR-MCNC: 147 MG/DL (ref 70–105)
GLUCOSE BLDC GLUCOMTR-MCNC: 155 MG/DL (ref 70–105)

## 2022-10-28 PROCEDURE — G0378 HOSPITAL OBSERVATION PER HR: HCPCS

## 2022-10-28 PROCEDURE — 82962 GLUCOSE BLOOD TEST: CPT

## 2022-10-28 PROCEDURE — 97161 PT EVAL LOW COMPLEX 20 MIN: CPT

## 2022-10-28 RX ORDER — HYDROCODONE BITARTRATE AND ACETAMINOPHEN 5; 325 MG/1; MG/1
2 TABLET ORAL EVERY 4 HOURS PRN
Qty: 50 TABLET | Refills: 0 | Status: SHIPPED | OUTPATIENT
Start: 2022-10-28

## 2022-10-28 RX ORDER — CYCLOBENZAPRINE HCL 10 MG
10 TABLET ORAL 3 TIMES DAILY PRN
Qty: 30 TABLET | Refills: 0 | Status: SHIPPED | OUTPATIENT
Start: 2022-10-28

## 2022-10-28 RX ADMIN — HYDROCODONE BITARTRATE AND ACETAMINOPHEN 1 TABLET: 10; 325 TABLET ORAL at 03:16

## 2022-10-28 RX ADMIN — ALLOPURINOL 100 MG: 100 TABLET ORAL at 08:10

## 2022-10-28 RX ADMIN — Medication 1000 UNITS: at 08:01

## 2022-10-28 RX ADMIN — POTASSIUM CHLORIDE 20 MEQ: 1.5 POWDER, FOR SOLUTION ORAL at 08:01

## 2022-10-28 RX ADMIN — HYDRALAZINE HYDROCHLORIDE 25 MG: 25 TABLET, FILM COATED ORAL at 08:01

## 2022-10-28 RX ADMIN — Medication 10 ML: at 08:01

## 2022-10-28 RX ADMIN — FUROSEMIDE 40 MG: 40 TABLET ORAL at 08:00

## 2022-10-28 RX ADMIN — EMPAGLIFLOZIN 10 MG: 10 TABLET, FILM COATED ORAL at 08:00

## 2022-10-28 RX ADMIN — Medication 200 MG: at 08:01

## 2022-10-28 RX ADMIN — DILTIAZEM HYDROCHLORIDE 120 MG: 120 CAPSULE, COATED, EXTENDED RELEASE ORAL at 08:00

## 2022-10-28 RX ADMIN — METFORMIN HYDROCHLORIDE 500 MG: 500 TABLET, EXTENDED RELEASE ORAL at 07:56

## 2022-10-28 RX ADMIN — PANTOPRAZOLE SODIUM 40 MG: 40 TABLET, DELAYED RELEASE ORAL at 07:55

## 2022-10-28 RX ADMIN — CYCLOBENZAPRINE 10 MG: 10 TABLET, FILM COATED ORAL at 07:56

## 2022-10-28 RX ADMIN — HYDROCODONE BITARTRATE AND ACETAMINOPHEN 1 TABLET: 10; 325 TABLET ORAL at 13:50

## 2022-11-28 ENCOUNTER — TRANSCRIBE ORDERS (OUTPATIENT)
Dept: PHYSICAL THERAPY | Facility: CLINIC | Age: 67
End: 2022-11-28

## 2022-11-28 DIAGNOSIS — Z98.1 S/P CERVICAL SPINAL FUSION: Primary | ICD-10-CM

## 2022-12-01 ENCOUNTER — HOSPITAL ENCOUNTER (OUTPATIENT)
Facility: HOSPITAL | Age: 67
Discharge: HOME | End: 2022-12-01
Attending: EMERGENCY MEDICINE | Admitting: EMERGENCY MEDICINE

## 2022-12-01 VITALS
TEMPERATURE: 97.1 F | WEIGHT: 305 LBS | DIASTOLIC BLOOD PRESSURE: 83 MMHG | RESPIRATION RATE: 18 BRPM | HEIGHT: 71 IN | BODY MASS INDEX: 42.7 KG/M2 | SYSTOLIC BLOOD PRESSURE: 132 MMHG | HEART RATE: 75 BPM | OXYGEN SATURATION: 95 %

## 2022-12-01 DIAGNOSIS — J10.1 INFLUENZA A: Primary | ICD-10-CM

## 2022-12-01 LAB
FLUAV SUBTYP SPEC NAA+PROBE: DETECTED
FLUBV RNA ISLT QL NAA+PROBE: NOT DETECTED
GLUCOSE BLDC GLUCOMTR-MCNC: 150 MG/DL (ref 70–130)
GLUCOSE BLDC GLUCOMTR-MCNC: 191 MG/DL (ref 70–130)
SARS-COV-2 RNA RESP QL NAA+PROBE: NOT DETECTED

## 2022-12-01 PROCEDURE — 99203 OFFICE O/P NEW LOW 30 MIN: CPT | Performed by: EMERGENCY MEDICINE

## 2022-12-01 PROCEDURE — 87636 SARSCOV2 & INF A&B AMP PRB: CPT

## 2022-12-01 PROCEDURE — G0463 HOSPITAL OUTPT CLINIC VISIT: HCPCS | Performed by: EMERGENCY MEDICINE

## 2022-12-01 PROCEDURE — 82962 GLUCOSE BLOOD TEST: CPT

## 2022-12-01 RX ORDER — OSELTAMIVIR PHOSPHATE 75 MG/1
75 CAPSULE ORAL 2 TIMES DAILY
Qty: 10 CAPSULE | Refills: 0 | Status: SHIPPED | OUTPATIENT
Start: 2022-12-01 | End: 2022-12-06

## 2022-12-02 NOTE — FSED PROVIDER NOTE
Subjective   History of Present Illness  Patient is a 67-year-old man who presents with 3 days of flulike symptoms with fevers and chills and general malaise and body aches nasal congestion postnasal drip and cough.  No vomiting or diarrhea.        Review of Systems   Constitutional: Positive for chills, fatigue and fever.   HENT: Positive for postnasal drip and rhinorrhea.    Respiratory: Positive for cough. Negative for shortness of breath.    Cardiovascular: Negative for chest pain.   Gastrointestinal: Negative for nausea and vomiting.   Genitourinary: Negative for difficulty urinating.   Musculoskeletal: Positive for myalgias.   Neurological: Negative for headaches.       Past Medical History:   Diagnosis Date   • Allergic    • CAD (coronary artery disease) 1997   • CKD (chronic kidney disease), stage II([facility]) 06/2018    Dr. West   • Diabetes mellitus ([facility])    • Diverticulosis 10/2016   • DJD (degenerative joint disease)    • DVT, lower extremity, recurrent, left ([facility]) 02/2017   • Elevated factor VIII level 2018   • Factor 5 Leiden mutation, heterozygous ([facility])    • Hyperlipidemia 1997   • Hypertension 1997   • Pneumonia 2018 January 2018 and June 2018   • Renal calculi 09/2016   • Sleep apnea 2018    Dr. Grier   • Urinary tract infection        Allergies   Allergen Reactions   • Bee Venom Anaphylaxis   • Gemfibrozil Hives     Lopid        Past Surgical History:   Procedure Laterality Date   • ANTERIOR CERVICAL DISCECTOMY W/ FUSION Bilateral 10/27/2022    Procedure: REOPERATION ANTERIOR CERVICAL DISCECTOMY AND FUSION C3-4 AND C4-5 WITH INTERBODY SPACER, AUTOGRAFT AND ANTERIOR INSTRUMENTATION AND REMOVAL OF ANTERIOR INSTRUMENTATION C5-6;  Surgeon: Juan Manuel Rushing MD;  Location: [facility] MAIN OR;  Service: Neurosurgery;  Laterality: Bilateral;   • CARDIAC CATHETERIZATION     • COLONOSCOPY N/A 11/5/2021    Procedure: COLONOSCOPY WITH POLYPECTOMY X 5;  Surgeon: Ronaldo Forrester MD;  Location: [facility]  ENDOSCOPY;  Service: Gastroenterology;  Laterality: N/A;  POLYP, DIVERTICULOSIS   • CYSTOSCOPY URETEROSCOPY Left 12/10/2018    Dr. Ty   • HAND SURGERY Left     Ganglion cyst removed palm of left hand   • HAND SURGERY  2019   • REPLACEMENT TOTAL KNEE Bilateral     one in  and the other in    • SEPTOPLASTY      deviated septum       Family History   Problem Relation Age of Onset   • Lymphoma Brother 42        Non Hodgkins lymphoma   • Stroke Brother    • Heart attack Other         Extensive MI History on Paternal side   • Clotting disorder Sister    • Heart attack Father    • Heart failure Father    • Heart attack Sister    • Heart disease Sister    • Heart attack Brother    • Heart disease Brother    • Hypertension Brother    • Heart attack Paternal Aunt    • Heart attack Paternal Uncle        Social History     Socioeconomic History   • Marital status:    Tobacco Use   • Smoking status: Former     Packs/day: 1.50     Years: 2.00     Pack years: 3.00     Types: Cigarettes     Start date: 1978     Quit date: 1980     Years since quittin.9   • Smokeless tobacco: Never   • Tobacco comments:     smoked one and a half packs of cigarettes a day for two years quitting at age 36   Vaping Use   • Vaping Use: Never used   Substance and Sexual Activity   • Alcohol use: Yes     Alcohol/week: 2.0 standard drinks     Types: 2 Cans of beer per week     Comment: drinks two beers monthly   • Drug use: No   • Sexual activity: Yes     Partners: Female     Birth control/protection: None           Objective   Physical Exam  Vitals and nursing note reviewed.   Constitutional:       General: He is not in acute distress.     Appearance: Normal appearance. He is well-developed. He is not ill-appearing or toxic-appearing.   HENT:      Head: Normocephalic and atraumatic.      Nose: Rhinorrhea present.      Mouth/Throat:      Mouth: Mucous membranes are moist.      Pharynx: Oropharynx is clear.   Eyes:       Extraocular Movements: Extraocular movements intact.      Pupils: Pupils are equal, round, and reactive to light.   Cardiovascular:      Rate and Rhythm: Normal rate and regular rhythm.      Pulses: Normal pulses.      Heart sounds: Normal heart sounds.   Pulmonary:      Effort: Pulmonary effort is normal. No respiratory distress.      Breath sounds: Normal breath sounds. No stridor. No wheezing, rhonchi or rales.   Abdominal:      Palpations: Abdomen is soft.      Tenderness: There is no abdominal tenderness.   Musculoskeletal:         General: No swelling. Normal range of motion.      Cervical back: Normal range of motion and neck supple. No tenderness.      Right lower leg: No edema.      Left lower leg: No edema.   Skin:     General: Skin is warm and dry.      Capillary Refill: Capillary refill takes less than 2 seconds.   Neurological:      General: No focal deficit present.      Mental Status: He is alert.      Sensory: No sensory deficit.      Motor: No weakness.         Procedures           ED Course                                           MDM  Patient with 3 days of flulike symptoms with fevers and chills and general malaise and body aches postnasal drip and cough.  Patient has no shortness of breath or chest pain.  He does have history of diabetes and has been monitoring his blood sugar and blood sugar was 150.  Patient has clear lungs to auscultation.  Patient appears well-nourished well-developed.  Patient did have a brief episode of felt dizzy after being told he had tested positive for influenza.  He was observed in felt back to normal self.  Patient has been advised to drink lots of fluids to stay hydrated and to monitor blood sugar.  Prescription for Tamiflu has been provided if he feels his symptoms are within 48 hours.  Final diagnoses:   Influenza A       ED Disposition  ED Disposition     ED Disposition   Discharge    Condition   Stable    Comment   --             Myra Tello MD  [address]  [address] [place] [address] [phone]    In 1 week      [place] [address] [place] [id number] [phone]    If symptoms worsen         Medication List      New Prescriptions    oseltamivir 75 MG capsule  Commonly known as: Tamiflu  Take 1 capsule by mouth 2 (Two) Times a Day for 5 days.           Where to Get Your Medications      These medications were sent to [facility]/pharmacy #[id number] - [place], [place] - [address] [place] [address] - [phone]  - [phone] [address] [place] [address]    Hours: 24-hours Phone: [phone]   · oseltamivir 75 MG capsule

## 2022-12-02 NOTE — ED NOTES
Cough since Tuesday, body aches , temp 102.3 at home. His primary MD wants him pneumonia ruled out d/t hx . Pt reports sound of cough is deeper than yesterday. Thick mucus , greenish

## 2022-12-02 NOTE — DISCHARGE INSTRUCTIONS
Lab Results   Component Value Date    HGBA1C 11 1 (A) 02/25/2021   Diabetic control is very poor  We discussed food choices and she snacks on chips and peanuts in between meals Please consider starting Tamiflu within 48 hours of your symptoms.  Drink plenty of fluids to stay hydrated.  Take Tylenol and ibuprofen as needed for fevers and aches.  Please follow-up with your provider.  Seek immediate medical attention if having any concerns.

## 2022-12-07 ENCOUNTER — TREATMENT (OUTPATIENT)
Dept: PHYSICAL THERAPY | Facility: CLINIC | Age: 67
End: 2022-12-07

## 2022-12-07 DIAGNOSIS — M54.2 CERVICALGIA: Primary | ICD-10-CM

## 2022-12-07 DIAGNOSIS — M43.22 CERVICAL VERTEBRAL FUSION: ICD-10-CM

## 2022-12-07 PROCEDURE — 97110 THERAPEUTIC EXERCISES: CPT | Performed by: PHYSICAL THERAPIST

## 2022-12-07 PROCEDURE — 97112 NEUROMUSCULAR REEDUCATION: CPT | Performed by: PHYSICAL THERAPIST

## 2022-12-07 PROCEDURE — 97161 PT EVAL LOW COMPLEX 20 MIN: CPT | Performed by: PHYSICAL THERAPIST

## 2022-12-07 NOTE — PROGRESS NOTES
Physical Therapy Initial Evaluation and Plan of Care    Patient: Seth Torres   : 1955  Diagnosis/ICD-10 Code:  Cervicalgia [M54.2]  Referring practitioner: Juan Manuel Rushing MD  Date of Initial Visit: 2022  Today's Date: 2022  Patient seen for 1 sessions           Subjective Questionnaire: NDI 28%      Subjective 65 yo male with c/o cspine and R UE pain s/p C4-5 and C3-C4 fusion revision on 10/27/22 by Dr. Rushing. Pt with exacerbation in pain ~2 years ago. Attempted physical therapy and pain management but treatment failed. Pt with progressive worsening in R UE symptoms. Pt reports alleviation of UE symptoms post op. Mobility improving. Still has difficulty with R sidelying. 2/10 cspine now and 10/10 at worst. Symptoms also worsen with cervical rotation. Using bone stimulator as instructed. 15 lb lifting restriction. R UE dominant. Primary complaint is limited cervical mobility and muscle tightness.  MD follow up: 23  Social hx: Retired      Objective          Tenderness     Additional Tenderness Details  Tender along C3 and C4 spinous processes.    Active Range of Motion   Cervical/Thoracic Spine   Cervical    Flexion: 24 degrees   Extension: 28 degrees   Left lateral flexion: 25 degrees   Right lateral flexion: 25 degrees   Left rotation: 50 degrees   Right rotation: 50 degrees     Strength/Myotome Testing   Cervical Spine     Left   Normal strength    Right   Neck lateral flexion (C3): 4+    Right Shoulder     Planes of Motion   Flexion: 4+   Abduction: 4+   External rotation at 0°: 4+     Right Elbow   Flexion: 4+  Extension: 4+    Right Wrist/Hand   Wrist extension: 5  Wrist flexion: 5      Incision is healing well, no signs of discharge or erythema.  Some guarding noted.    Assessment & Plan     Assessment  Impairments: abnormal or restricted ROM, activity intolerance, impaired physical strength, lacks appropriate home exercise program and pain with function  Functional  Limitations: carrying objects, lifting, sleeping, pulling, pushing, uncomfortable because of pain, standing, reaching behind back, reaching overhead and unable to perform repetitive tasks  Assessment details: 68 yo male with c/o cspine s/p cervical fusion revision. Pt is with a good response to treatment this date and would benefit from further evaluation and treatment to address the above impairments.    Prognosis: good    Goals  Plan Goals: Short term goals, 1 week: Tolerate HEP progression.  Voice compliance with activity modification.  Report improvement in symptoms.    LTGs, 4 weeks: Improve NDI to 12%.  Rate worst pain at 4/10.  Report 50% improvement in function.  Independent with HEP.    Plan  Therapy options: will be seen for skilled therapy services  Other planned modality interventions: modalities prn  Planned therapy interventions: flexibility, body mechanics training, functional ROM exercises, home exercise program, manual therapy, neuromuscular re-education, postural training, strengthening, stretching and therapeutic activities  Frequency: 2-3 times per week.  Duration in weeks: 4  Treatment plan discussed with: patient  Plan details: 30 visits per POC, 90 day certification period      Timed:         Manual Therapy:    15     mins  29019;     Therapeutic Exercise:      mins  02640;     Neuromuscular Janeth:   15     mins  72346;    Therapeutic Activity:          mins  43069;     Gait Training:           mins  47194;     Ultrasound:          mins  16090;    Ionto                                   mins   04766  Self Care                            mins   70178    Un-Timed:  Electrical Stimulation:         mins  78780 ( );  Traction          mins 72658  Low Eval     15     Mins  09248  Mod Eval          Mins  55591  High Eval                            Mins  97312  Re-Eval                               mins  42074        Timed Treatment:   30   mins   Total Treatment:     45   mins    PT SIGNATURE:  Ayden Magaña, PT, DPT, OCS  [place] license: [id number]  DATE TREATMENT INITIATED: 12/7/2022    Initial Certification  Certification Period: 3/7/2023  I certify that the therapy services are furnished while this patient is under my care.  The services outlined above are required for this patient and will be reviewed every 90 days.     PHYSICIAN: _____________________________    Juan Manuel Rushing MD      DATE:     Please sign and return via fax to [phone]. Thank you, [facility].

## 2022-12-09 ENCOUNTER — TREATMENT (OUTPATIENT)
Dept: PHYSICAL THERAPY | Facility: CLINIC | Age: 67
End: 2022-12-09

## 2022-12-09 DIAGNOSIS — M54.2 CERVICALGIA: Primary | ICD-10-CM

## 2022-12-09 DIAGNOSIS — M43.22 CERVICAL VERTEBRAL FUSION: ICD-10-CM

## 2022-12-09 DIAGNOSIS — M48.02 STENOSIS OF CERVICAL SPINE: ICD-10-CM

## 2022-12-09 PROCEDURE — 97110 THERAPEUTIC EXERCISES: CPT | Performed by: PHYSICAL THERAPIST

## 2022-12-09 PROCEDURE — 97112 NEUROMUSCULAR REEDUCATION: CPT | Performed by: PHYSICAL THERAPIST

## 2022-12-09 NOTE — PROGRESS NOTES
Physical Therapy Daily Treatment Note  [facility] Physical Therapy  [address] [place] [address]     Patient: Seth Torres   : 1955   Referring practitioner: Juan Manuel Rushing MD  Date of initial visit: Type: THERAPY  Noted: 2022   Today's date: 2022   Patient seen for 2 visits    Visit Diagnoses:    ICD-10-CM ICD-9-CM   1. Cervicalgia  M54.2 723.1   2. Cervical vertebral fusion  M43.22 724.9   3. Stenosis of cervical spine  M48.02 723.0       Subjective   Pt reports: No new complaints. HEP going well.      Objective     See Exercise, Manual, and Modality Logs for complete treatment.     Patient Education: HEP    Assessment/Plan Tolerating there ex well.      Progress per Plan of Care            Timed:         Manual Therapy:         mins  12627;     Therapeutic Exercise:    15     mins  59726;     Neuromuscular Janeth:    30    mins  94052;    Therapeutic Activity:          mins  79193;     Gait Training:           mins  55779;     Ultrasound:          mins  70642;    Ionto                                   mins   84377  Self Care                            mins   44235    Un-Timed:  Electrical Stimulation:         mins  18034 ( );  Traction          mins 22223  Low Eval          Mins  69606  Mod Eval          Mins  79775  High Eval                            Mins  26734  Re-Eval                               mins  38294    Timed Treatment:   45   mins   Total Treatment:     45   mins      Ayden Magaña, PT, DPT, OCS  [place] license: [id number]  Physical Therapist

## 2022-12-14 ENCOUNTER — TREATMENT (OUTPATIENT)
Dept: PHYSICAL THERAPY | Facility: CLINIC | Age: 67
End: 2022-12-14

## 2022-12-14 DIAGNOSIS — M54.2 CERVICALGIA: Primary | ICD-10-CM

## 2022-12-14 DIAGNOSIS — M48.02 STENOSIS OF CERVICAL SPINE: ICD-10-CM

## 2022-12-14 DIAGNOSIS — M43.22 CERVICAL VERTEBRAL FUSION: ICD-10-CM

## 2022-12-14 PROCEDURE — 97112 NEUROMUSCULAR REEDUCATION: CPT | Performed by: PHYSICAL THERAPIST

## 2022-12-14 PROCEDURE — 97110 THERAPEUTIC EXERCISES: CPT | Performed by: PHYSICAL THERAPIST

## 2022-12-14 NOTE — PROGRESS NOTES
Physical Therapy Daily Treatment Note  [facility] Therapy  [address] [place] [address]     Patient: Seth Torres   : 1955   Referring practitioner: Juan Manuel Rushing MD  Date of initial visit: Type: THERAPY  Noted: 2022   Today's date: 2022   Patient seen for 3 visits    Visit Diagnoses:    ICD-10-CM ICD-9-CM   1. Cervicalgia  M54.2 723.1   2. Cervical vertebral fusion  M43.22 724.9   3. Stenosis of cervical spine  M48.02 723.0       Subjective   Pt reports: Some neck soreness/stiffness after last visit. HEP going well.      Objective     See Exercise, Manual, and Modality Logs for complete treatment.     Patient Education: HEP    Assessment/Plan Fatigued post visit, tolerating rx well.      Progress per Plan of Care            Timed:         Manual Therapy:         mins  11042;     Therapeutic Exercise:    15     mins  48762;     Neuromuscular Janeth:    30    mins  69727;    Therapeutic Activity:          mins  41496;     Gait Training:           mins  36956;     Ultrasound:          mins  87804;    Ionto                                   mins   64922  Self Care                            mins   41790    Un-Timed:  Electrical Stimulation:         mins  60101 ( );  Traction          mins 74150  Low Eval          Mins  70844  Mod Eval          Mins  82650  High Eval                            Mins  33229  Re-Eval                               mins  64776    Timed Treatment:   45   mins   Total Treatment:     45   mins      Ayden Magaña, PT, DPT, OCS  [place] license: [id number]  Physical Therapist

## 2022-12-16 ENCOUNTER — TREATMENT (OUTPATIENT)
Dept: PHYSICAL THERAPY | Facility: CLINIC | Age: 67
End: 2022-12-16

## 2022-12-16 DIAGNOSIS — M54.2 CERVICALGIA: Primary | ICD-10-CM

## 2022-12-16 DIAGNOSIS — M43.22 CERVICAL VERTEBRAL FUSION: ICD-10-CM

## 2022-12-16 DIAGNOSIS — M48.02 STENOSIS OF CERVICAL SPINE: ICD-10-CM

## 2022-12-16 PROCEDURE — 97112 NEUROMUSCULAR REEDUCATION: CPT | Performed by: PHYSICAL THERAPIST

## 2022-12-16 PROCEDURE — 97110 THERAPEUTIC EXERCISES: CPT | Performed by: PHYSICAL THERAPIST

## 2022-12-16 NOTE — PROGRESS NOTES
Physical Therapy Daily Treatment Note  [facility]  [address] [place] [address]     Patient: Seth Torres   : 1955   Referring practitioner: Juan Manuel Rushing MD  Date of initial visit: Type: THERAPY  Noted: 2022   Today's date: 2022   Patient seen for 4 visits    Visit Diagnoses:    ICD-10-CM ICD-9-CM   1. Cervicalgia  M54.2 723.1   2. Cervical vertebral fusion  M43.22 724.9   3. Stenosis of cervical spine  M48.02 723.0       Subjective   Pt reports: Continuing to feel better. HEP going well.      Objective     See Exercise, Manual, and Modality Logs for complete treatment.     Patient Education: HEP    Assessment/Plan Fatigued post visit, progressing well.      Progress per Plan of Care            Timed:         Manual Therapy:         mins  43795;     Therapeutic Exercise:    15     mins  56596;     Neuromuscular Janeth:    30    mins  85995;    Therapeutic Activity:          mins  27638;     Gait Training:           mins  56412;     Ultrasound:          mins  68600;    Ionto                                   mins   65096  Self Care                            mins   55357    Un-Timed:  Electrical Stimulation:         mins  36366 ( );  Traction          mins 34645  Low Eval          Mins  78773  Mod Eval          Mins  71529  High Eval                            Mins  75179  Re-Eval                               mins  99323    Timed Treatment:   45   mins   Total Treatment:     45   mins      Ayden Magaña PT, DPT, OCS  [place] license: [id number]  Physical Therapist

## 2022-12-20 ENCOUNTER — TREATMENT (OUTPATIENT)
Dept: PHYSICAL THERAPY | Facility: CLINIC | Age: 67
End: 2022-12-20

## 2022-12-20 DIAGNOSIS — M43.22 CERVICAL VERTEBRAL FUSION: ICD-10-CM

## 2022-12-20 DIAGNOSIS — M54.2 CERVICALGIA: Primary | ICD-10-CM

## 2022-12-20 DIAGNOSIS — M48.02 STENOSIS OF CERVICAL SPINE: ICD-10-CM

## 2022-12-20 PROCEDURE — 97110 THERAPEUTIC EXERCISES: CPT | Performed by: PHYSICAL THERAPIST

## 2022-12-20 PROCEDURE — 97112 NEUROMUSCULAR REEDUCATION: CPT | Performed by: PHYSICAL THERAPIST

## 2022-12-20 NOTE — PROGRESS NOTES
Physical Therapy Daily Treatment Note  [facility] Physical Therapy  [address] [place] [address]     Patient: Seth Torres   : 1955   Referring practitioner: Juan Manuel Rushing MD  Date of initial visit: Type: THERAPY  Noted: 2022   Today's date: 2022   Patient seen for 5 visits    Visit Diagnoses:    ICD-10-CM ICD-9-CM   1. Cervicalgia  M54.2 723.1   2. Cervical vertebral fusion  M43.22 724.9   3. Stenosis of cervical spine  M48.02 723.0       Subjective   Pt reports: Feeling stronger. Sometimes with cspine stiffness with overhead reaching. Hep going well.      Objective     See Exercise, Manual, and Modality Logs for complete treatment.     Patient Education: HEP    Assessment/Plan Fatigued post visit, tolerating treatment well.      Progress per Plan of Care            Timed:         Manual Therapy:         mins  13548;     Therapeutic Exercise:    15     mins  21743;     Neuromuscular Janeth:    30    mins  75254;    Therapeutic Activity:          mins  49469;     Gait Training:           mins  16086;     Ultrasound:          mins  69925;    Ionto                                   mins   00948  Self Care                            mins   64436    Un-Timed:  Electrical Stimulation:         mins  68126 ( );  Traction          mins 07512  Low Eval          Mins  52877  Mod Eval          Mins  31029  High Eval                            Mins  04565  Re-Eval                               mins  24689    Timed Treatment:   45   mins   Total Treatment:     45   mins      Ayden Magaña, PT, DPT, OCS  [place] license: [id number]  Physical Therapist

## 2022-12-28 ENCOUNTER — TREATMENT (OUTPATIENT)
Dept: PHYSICAL THERAPY | Facility: CLINIC | Age: 67
End: 2022-12-28

## 2022-12-28 DIAGNOSIS — M48.02 STENOSIS OF CERVICAL SPINE: ICD-10-CM

## 2022-12-28 DIAGNOSIS — M54.2 CERVICALGIA: Primary | ICD-10-CM

## 2022-12-28 DIAGNOSIS — M43.22 CERVICAL VERTEBRAL FUSION: ICD-10-CM

## 2022-12-28 PROCEDURE — 97112 NEUROMUSCULAR REEDUCATION: CPT | Performed by: PHYSICAL THERAPIST

## 2022-12-28 PROCEDURE — 97110 THERAPEUTIC EXERCISES: CPT | Performed by: PHYSICAL THERAPIST

## 2022-12-28 NOTE — PROGRESS NOTES
Physical Therapy Daily Treatment Note  [facility] Physical Therapy  [address] [facility] [address] [place] [address]     Patient: Seth Torres   : 1955   Referring practitioner: Juan Manuel Rushing MD  Date of initial visit: Type: THERAPY  Noted: 2022   Today's date: 2022   Patient seen for 6 visits    Visit Diagnoses:    ICD-10-CM ICD-9-CM   1. Cervicalgia  M54.2 723.1   2. Cervical vertebral fusion  M43.22 724.9   3. Stenosis of cervical spine  M48.02 723.0       Subjective   Pt reports: Feeling stronger. Discomfort and stiffness improves with stretching. HEP going well.      Objective     See Exercise, Manual, and Modality Logs for complete treatment.     Patient Education: HEP    Assessment/Plan Fatigued post visit, progressing well.      Progress per Plan of Care            Timed:         Manual Therapy:         mins  87964;     Therapeutic Exercise:    15     mins  07256;     Neuromuscular Janeth:    30    mins  01005;    Therapeutic Activity:          mins  82378;     Gait Training:           mins  39834;     Ultrasound:          mins  47908;    Ionto                                   mins   44258  Self Care                            mins   56771    Un-Timed:  Electrical Stimulation:         mins  02712 ( );  Traction          mins 24346  Low Eval          Mins  09850  Mod Eval          Mins  35174  High Eval                            Mins  10919  Re-Eval                               mins  46277    Timed Treatment:   45   mins   Total Treatment:     45   mins      Ayden Magaña, PT, DPT, OCS  [place] license: [id number]  Physical Therapist

## 2022-12-30 ENCOUNTER — TREATMENT (OUTPATIENT)
Dept: PHYSICAL THERAPY | Facility: CLINIC | Age: 67
End: 2022-12-30

## 2022-12-30 DIAGNOSIS — M43.22 CERVICAL VERTEBRAL FUSION: ICD-10-CM

## 2022-12-30 DIAGNOSIS — M48.02 STENOSIS OF CERVICAL SPINE: ICD-10-CM

## 2022-12-30 DIAGNOSIS — M54.2 CERVICALGIA: Primary | ICD-10-CM

## 2022-12-30 PROCEDURE — 97110 THERAPEUTIC EXERCISES: CPT | Performed by: PHYSICAL THERAPIST

## 2022-12-30 PROCEDURE — 97112 NEUROMUSCULAR REEDUCATION: CPT | Performed by: PHYSICAL THERAPIST

## 2022-12-30 NOTE — PROGRESS NOTES
Re-Assessment / Re-Certification        Patient: Seth Torres   : 1955  Diagnosis/ICD-10 Code:  Cervicalgia [M54.2]  Referring practitioner: Juan Manuel Rushing MD  Date of Initial Visit: Type: THERAPY  Noted: 2022  Today's Date: 2022  Patient seen for 7 sessions      Subjective:     Subjective Questionnaire: NDI:10%  Clinical Progress: improved  Home Program Compliance: Yes  Treatment has included: therapeutic exercise and neuromuscular re-education    Subjective Pt feels he is approaching his prior level of function and feels independent with his HEP. Pt feeling ready for DC from therapy. Generally with 0/10 pain.  Objective   AROM wfl, 5/5 strength  Assessment/Plan   Pt has met his goals to this point.    Pt to try 2-3 weeks of independent HEP then follow up here if needed. Will DC if this goes well and pending MD follow up.    Ayden Magaña, PT, DPT, OCS  [place] license: [id number]  Physical Therapist         Timed:         Manual Therapy:         mins  88209;     Therapeutic Exercise:    15     mins  08474;     Neuromuscular Janeth:    30    mins  72954;    Therapeutic Activity:          mins  20930;     Gait Training:           mins  06677;     Ultrasound:          mins  35181;    Ionto                                   mins   06663  Self Care                            mins   99435    Un-Timed:  Electrical Stimulation:         mins  48534 ( );  Traction          mins 95352  Low Eval          Mins  64398  Mod Eval          Mins  76065  High Eval                            Mins  52642  Re-Eval                               mins  86760      Timed Treatment:   45   mins   Total Treatment:     45   mins

## 2023-02-12 NOTE — PROGRESS NOTES
Encounter Date:02/21/2023  Last seen 8/11/2022      Patient ID: Seth Torres is a 67 y.o. male.    Chief Complaint:  Atrial fibrillation  Coronary artery disease  Anticoagulation management  Renal dysfunction        History of Present Illness  Since I have last seen, the patient has been without any chest discomfort ,shortness of breath, palpitations, dizziness or syncope.  Denies having any headache ,abdominal pain ,nausea, vomiting , diarrhea constipation, loss of weight or loss of appetite.  Denies having any excessive bruising ,hematuria or blood in the stool.    Review of all systems negative except as indicated.    Reviewed ROS.    Assessment and Plan      ]]]]]]]]]]]]]]]]]]]]   impression  ==========   -history of atrial fibrillation  converted and maintaining sinus rhythm.     Echocardiogram showed normal left ventricular function without any pericardial effusion.  June 2018.      Patient had pneumonia and febrile illness associated with atrial fibrillatio    -History of pneumonia-left lower lobe seen on chest x-ray as well of conformed by CT scan.      -Chronic coronary artery disease-stable angina pectoris   mild coronary artery disease.  Cardiac catheterization 2012 revealed 50-60% lad disease     -anticoagulation -on Eliquis as long-term treatment for DVT      -Renal dysfunction bun35 cr 2.4 hypertension dyslipidemia diabetes     - history of DVT Status post thrombectomy.  Status post IVC filter placement     -Exogenous obesity     -allergic to bee venom lopid  =======  Plan  ==========  History of atrial fibrillation  Patient has converted and maintaining sinus rhythm.  EKG showed sinus rhythm without any ischemic changes.-  8/11/2022  EKG 10/3/2022 showed sinus rhythm (reviewed independently and interpreted.     Chronic coronary artery disease  Patient is not having any angina pectoris or congestive heart failure.     Anticoagulation-on Eliquis.  Observe for toxic effects.    CKD 3-Dr. West  follow-up.     Hypertension- 150/80     Dyslipidemia-continue pravastatin    Diabetes-recently started by Dr. Tello on Farxiga.  Patient requested samples and was provided.  Coordination with Dr. West regarding renal dysfunction and Farxiga.  This was discussed with patient.    Medications were reviewed and updated.  patient is off amiodarone  Continue Cardizem and Eliquis  Continue Eliquis as long-term therapy for DVT etc.    Followup in the office in  6 months with EKG     Further plan will depend on patient's progress.  [[[[[[[[[[[[[[[[[[           Diagnosis Plan   1. Paroxysmal atrial fibrillation (HCC)        2. Long term (current) use of anticoagulants        3. Stage 3 chronic kidney disease, unspecified whether stage 3a or 3b CKD (HCC)        4. Recurrent deep vein thrombosis (DVT) of left lower extremity (HCC)        LAB RESULTS (LAST 7 DAYS)    CBC        BMP        CMP         BNP        TROPONIN        CoAg        Creatinine Clearance  CrCl cannot be calculated (Patient's most recent lab result is older than the maximum 30 days allowed.).    ABG        Radiology  No radiology results for the last day                The following portions of the patient's history were reviewed and updated as appropriate: allergies, current medications, past family history, past medical history, past social history, past surgical history and problem list.    Review of Systems   Constitutional: Negative for malaise/fatigue.   Cardiovascular: Negative for chest pain, dyspnea on exertion, leg swelling and palpitations.   Respiratory: Negative for cough and shortness of breath.    Gastrointestinal: Negative for abdominal pain, nausea and vomiting.   Neurological: Negative for dizziness, focal weakness, headaches, light-headedness and numbness.   All other systems reviewed and are negative.        Current Outpatient Medications:   •  allopurinol (ZYLOPRIM) 100 MG tablet, Take 1 tablet by mouth Daily., Disp: , Rfl:   •   cholecalciferol (VITAMIN D3) 25 MCG (1000 UT) tablet, Take 1,000 Units by mouth Daily., Disp: , Rfl:   •  cyclobenzaprine (FLEXERIL) 10 MG tablet, Take 1 tablet by mouth 3 (Three) Times a Day As Needed for Muscle Spasms., Disp: 30 tablet, Rfl: 0  •  dapagliflozin (Farxiga) 5 MG tablet tablet, Take 5 mg by mouth Daily., Disp: , Rfl:   •  diltiazem XR (DILACOR XR) 240 MG 24 hr capsule, 1 capsule Daily., Disp: , Rfl:   •  furosemide (LASIX) 40 MG tablet, FUROSEMIDE 40 MG TABS, Disp: , Rfl:   •  hydrALAZINE (APRESOLINE) 25 MG tablet, Take 25 mg by mouth 2 (Two) Times a Day., Disp: , Rfl:   •  HYDROcodone-acetaminophen (Norco) 5-325 MG per tablet, Take 2 tablets by mouth Every 4 (Four) Hours As Needed for Severe Pain. Maximum daily dose of 8 tablets., Disp: 50 tablet, Rfl: 0  •  isosorbide mononitrate (ISMO,MONOKET) 20 MG tablet, Take 20 mg by mouth Every Night., Disp: , Rfl:   •  Magnesium Oxide -Mg Supplement 400 MG capsule, 1 capsule Daily., Disp: , Rfl:   •  metFORMIN (FORTAMET) 500 MG (OSM) 24 hr tablet, Daily., Disp: , Rfl:   •  omeprazole (priLOSEC) 40 MG capsule, Take 40 mg by mouth Daily., Disp: , Rfl:   •  potassium chloride (KLOR-CON) 20 MEQ packet, Take 20 mEq by mouth Daily., Disp: , Rfl:   •  pravastatin (PRAVACHOL) 80 MG tablet, Take 80 mg by mouth Every Night., Disp: , Rfl:   •  terbinafine (lamiSIL) 250 MG tablet, Take 250 mg by mouth Daily., Disp: , Rfl:     Allergies   Allergen Reactions   • Bee Venom Anaphylaxis   • Gemfibrozil Hives     Lopid        Family History   Problem Relation Age of Onset   • Lymphoma Brother 42        Non Hodgkins lymphoma   • Stroke Brother    • Heart attack Other         Extensive MI History on Paternal side   • Clotting disorder Sister    • Heart attack Father    • Heart failure Father    • Heart attack Sister    • Heart disease Sister    • Heart attack Brother    • Heart disease Brother    • Hypertension Brother    • Heart attack Paternal Aunt    • Heart attack Paternal  Uncle        Past Surgical History:   Procedure Laterality Date   • ANTERIOR CERVICAL DISCECTOMY W/ FUSION Bilateral 10/27/2022    Procedure: REOPERATION ANTERIOR CERVICAL DISCECTOMY AND FUSION C3-4 AND C4-5 WITH INTERBODY SPACER, AUTOGRAFT AND ANTERIOR INSTRUMENTATION AND REMOVAL OF ANTERIOR INSTRUMENTATION C5-6;  Surgeon: Juan Manuel Rushing MD;  Location: [facility] MAIN OR;  Service: Neurosurgery;  Laterality: Bilateral;   • CARDIAC CATHETERIZATION     • COLONOSCOPY N/A 11/5/2021    Procedure: COLONOSCOPY WITH POLYPECTOMY X 5;  Surgeon: Ronaldo Forrester MD;  Location: [facility] ENDOSCOPY;  Service: Gastroenterology;  Laterality: N/A;  POLYP, DIVERTICULOSIS   • CYSTOSCOPY URETEROSCOPY Left 12/10/2018    Dr. Ty   • HAND SURGERY Left     Ganglion cyst removed palm of left hand   • HAND SURGERY  08/21/2019   • REPLACEMENT TOTAL KNEE Bilateral     one in 2001 and the other in 2002   • SEPTOPLASTY      deviated septum       Past Medical History:   Diagnosis Date   • Allergic    • CAD (coronary artery disease) 1997   • CKD (chronic kidney disease), stage II([facility]) 06/2018    Dr. West   • Diabetes mellitus ([facility])    • Diverticulosis 10/2016   • DJD (degenerative joint disease)    • DVT, lower extremity, recurrent, left ([facility]) 02/2017   • Elevated factor VIII level 2018   • Factor 5 Leiden mutation, heterozygous ([facility])    • Hyperlipidemia 1997   • Hypertension 1997   • Pneumonia 2018 January 2018 and June 2018   • Renal calculi 09/2016   • Sleep apnea 2018    Dr. Grier   • Urinary tract infection        Family History   Problem Relation Age of Onset   • Lymphoma Brother 42        Non Hodgkins lymphoma   • Stroke Brother    • Heart attack Other         Extensive MI History on Paternal side   • Clotting disorder Sister    • Heart attack Father    • Heart failure Father    • Heart attack Sister    • Heart disease Sister    • Heart attack Brother    • Heart disease Brother    • Hypertension Brother    • Heart attack  Paternal Aunt    • Heart attack Paternal Uncle        Social History     Socioeconomic History   • Marital status:    Tobacco Use   • Smoking status: Former     Packs/day: 1.50     Years: 2.00     Pack years: 3.00     Types: Cigarettes     Start date: 1978     Quit date: 1980     Years since quittin.1   • Smokeless tobacco: Never   • Tobacco comments:     smoked one and a half packs of cigarettes a day for two years quitting at age 36   Vaping Use   • Vaping Use: Never used   Substance and Sexual Activity   • Alcohol use: Yes     Alcohol/week: 2.0 standard drinks     Types: 2 Cans of beer per week     Comment: drinks two beers monthly   • Drug use: No   • Sexual activity: Yes     Partners: Female     Birth control/protection: None         Procedures      Objective:       Physical Exam    There were no vitals taken for this visit.  The patient is alert, oriented and in no distress.    Vital signs as noted above.  Exogenous obesity (BMI 43)    Head and neck revealed no carotid bruits or jugular venous distension.  No thyromegaly or lymphadenopathy is present.    Lungs clear.  No wheezing.  Breath sounds are normal bilaterally.    Heart normal first and second heart sounds.  No murmur..  No pericardial rub is present.  No gallop is present.    Abdomen soft and nontender.  No organomegaly is present.    Extremities revealed good peripheral pulses without any pedal edema.    Skin warm and dry.    Musculoskeletal system is grossly normal.    CNS grossly normal.    Reviewed and updated.

## 2023-02-21 ENCOUNTER — OFFICE VISIT (OUTPATIENT)
Dept: CARDIOLOGY | Facility: CLINIC | Age: 68
End: 2023-02-21
Payer: MEDICARE

## 2023-02-21 VITALS
HEART RATE: 68 BPM | DIASTOLIC BLOOD PRESSURE: 84 MMHG | SYSTOLIC BLOOD PRESSURE: 150 MMHG | BODY MASS INDEX: 42.56 KG/M2 | WEIGHT: 304 LBS | OXYGEN SATURATION: 96 % | HEIGHT: 71 IN

## 2023-02-21 DIAGNOSIS — I82.402 RECURRENT DEEP VEIN THROMBOSIS (DVT) OF LEFT LOWER EXTREMITY: ICD-10-CM

## 2023-02-21 DIAGNOSIS — I48.0 PAROXYSMAL ATRIAL FIBRILLATION: Primary | ICD-10-CM

## 2023-02-21 DIAGNOSIS — N18.30 STAGE 3 CHRONIC KIDNEY DISEASE, UNSPECIFIED WHETHER STAGE 3A OR 3B CKD: ICD-10-CM

## 2023-02-21 DIAGNOSIS — Z79.01 LONG TERM (CURRENT) USE OF ANTICOAGULANTS: ICD-10-CM

## 2023-02-21 PROCEDURE — 99214 OFFICE O/P EST MOD 30 MIN: CPT | Performed by: INTERNAL MEDICINE

## 2023-02-21 RX ORDER — ISOSORBIDE MONONITRATE 30 MG/1
TABLET, EXTENDED RELEASE ORAL
COMMUNITY
Start: 2023-02-11

## 2023-02-21 RX ORDER — APIXABAN 5 MG/1
TABLET, FILM COATED ORAL
COMMUNITY
Start: 2022-12-21

## 2023-08-16 NOTE — PROGRESS NOTES
Encounter Date:08/29/2023  Last seen 2/21/2023      Patient ID: Seth Torres is a 68 y.o. male.    Chief Complaint:  Atrial fibrillation  Coronary artery disease  Anticoagulation management  Renal dysfunction  Hypertension     History of Present Illness  Since I have last seen, the patient has been without any chest discomfort ,shortness of breath, palpitations, dizziness or syncope.  Denies having any headache ,abdominal pain ,nausea, vomiting , diarrhea constipation, loss of weight or loss of appetite.  Denies having any excessive bruising ,hematuria or blood in the stool.    Review of all systems negative except as indicated.    Reviewed ROS.    Assessment and Plan      ]]]]]]]]]]]]]]]]]]]]  History  ==========   -history of atrial fibrillation  converted and maintaining sinus rhythm.  Sinus rhythm-8/29/2023     Echocardiogram showed normal left ventricular function without any pericardial effusion.  June 2018.      Patient had pneumonia and febrile illness associated with atrial atrial fibrillation     -History of pneumonia-left lower lobe seen on chest x-ray as well of conformed by CT scan.      -Chronic coronary artery disease-stable angina pectoris   mild coronary artery disease.  Cardiac catheterization 2012 revealed 50-60% lad disease     -anticoagulation -on Eliquis as long-term treatment for DVT      -Renal dysfunction bun35 cr 2.4  Hypertension dyslipidemia diabetes     - history of DVT Status post thrombectomy.  Status post IVC filter placement     -Exogenous obesity     -allergic to bee venom lopid  =======  Plan  ==========  History of atrial fibrillation  Patient has converted and maintaining sinus rhythm.  EKG showed sinus rhythm without any ischemic changes.-  8/11/2022  EKG 10/3/2022 showed sinus rhythm (reviewed independently and interpreted.)  EKG 8/29/2023-sinus rhythm     Chronic coronary artery disease  Patient is not having any angina pectoris or congestive heart failure.      Anticoagulation-on Eliquis.  Observe for toxic effects.     CKD 3-Dr. West follow-up.     Hypertension-149/80.  Borderline elevation.  Maintain blood pressure log.    Dyslipidemia-continue pravastatin     Diabetes-patient is on Farxiga.  Patient requested samples and was provided.  Coordination with Dr. West regarding renal dysfunction and Farxiga.  This was discussed with patient.     Medications were reviewed and updated.  patient is off amiodarone  Continue Cardizem and Eliquis  Continue Eliquis as long-term therapy for DVT etc.     Followup in the office in  6 months with EKG     Further plan will depend on patient's progress.    Reviewed and updated-8/29/2023.  [[[[[[[[[[[[[[[[[[         Diagnosis Plan   1. Paroxysmal atrial fibrillation        2. Long term (current) use of anticoagulants        3. Stage 3 chronic kidney disease, unspecified whether stage 3a or 3b CKD        4. Recurrent deep vein thrombosis (DVT) of left lower extremity        5. Spinal stenosis in cervical region        LAB RESULTS (LAST 7 DAYS)    CBC        BMP        CMP         BNP        TROPONIN        CoAg        Creatinine Clearance  CrCl cannot be calculated (Patient's most recent lab result is older than the maximum 30 days allowed.).    ABG        Radiology  No radiology results for the last day                The following portions of the patient's history were reviewed and updated as appropriate: allergies, current medications, past family history, past medical history, past social history, past surgical history, and problem list.    Review of Systems   Constitutional: Negative for malaise/fatigue.   Cardiovascular:  Negative for chest pain, leg swelling, palpitations and syncope.   Respiratory:  Negative for shortness of breath.    Skin:  Negative for rash.   Gastrointestinal:  Negative for nausea and vomiting.   Neurological:  Negative for dizziness, light-headedness and numbness.   All other systems reviewed and are  negative.      Current Outpatient Medications:     allopurinol (ZYLOPRIM) 100 MG tablet, Take 1 tablet by mouth Daily., Disp: , Rfl:     cholecalciferol (VITAMIN D3) 25 MCG (1000 UT) tablet, Take 1 tablet by mouth Daily., Disp: , Rfl:     dapagliflozin (Farxiga) 5 MG tablet tablet, Take 1 tablet by mouth Daily., Disp: , Rfl:     diltiazem XR (DILACOR XR) 240 MG 24 hr capsule, 1 capsule Daily., Disp: , Rfl:     Eliquis 5 MG tablet tablet, , Disp: , Rfl:     furosemide (LASIX) 40 MG tablet, FUROSEMIDE 40 MG TABS, Disp: , Rfl:     hydrALAZINE (APRESOLINE) 25 MG tablet, Take 1 tablet by mouth 2 (Two) Times a Day., Disp: , Rfl:     isosorbide mononitrate (IMDUR) 30 MG 24 hr tablet, , Disp: , Rfl:     Magnesium Oxide -Mg Supplement 400 MG capsule, 1 capsule Daily., Disp: , Rfl:     metFORMIN (FORTAMET) 500 MG (OSM) 24 hr tablet, Daily., Disp: , Rfl:     omeprazole (priLOSEC) 40 MG capsule, Take 1 capsule by mouth Daily., Disp: , Rfl:     potassium chloride (KLOR-CON) 20 MEQ packet, Take 20 mEq by mouth Daily., Disp: , Rfl:     pravastatin (PRAVACHOL) 80 MG tablet, Take 1 tablet by mouth Every Night., Disp: , Rfl:     cyclobenzaprine (FLEXERIL) 10 MG tablet, Take 1 tablet by mouth 3 (Three) Times a Day As Needed for Muscle Spasms. (Patient not taking: Reported on 8/29/2023), Disp: 30 tablet, Rfl: 0    Allergies   Allergen Reactions    Bee Venom Anaphylaxis    Gemfibrozil Hives     Lopid        Family History   Problem Relation Age of Onset    Lymphoma Brother 42        Non Hodgkins lymphoma    Stroke Brother     Heart attack Other         Extensive MI History on Paternal side    Clotting disorder Sister     Heart attack Father     Heart failure Father     Heart attack Sister     Heart disease Sister     Heart attack Brother     Heart disease Brother     Hypertension Brother     Heart attack Paternal Aunt     Heart attack Paternal Uncle        Past Surgical History:   Procedure Laterality Date    ANTERIOR CERVICAL  DISCECTOMY W/ FUSION Bilateral 10/27/2022    Procedure: REOPERATION ANTERIOR CERVICAL DISCECTOMY AND FUSION C3-4 AND C4-5 WITH INTERBODY SPACER, AUTOGRAFT AND ANTERIOR INSTRUMENTATION AND REMOVAL OF ANTERIOR INSTRUMENTATION C5-6;  Surgeon: Juan Manuel Rushing MD;  Location: [facility] MAIN OR;  Service: Neurosurgery;  Laterality: Bilateral;    CARDIAC CATHETERIZATION      COLONOSCOPY N/A 11/5/2021    Procedure: COLONOSCOPY WITH POLYPECTOMY X 5;  Surgeon: Ronaldo Forrester MD;  Location: [facility] ENDOSCOPY;  Service: Gastroenterology;  Laterality: N/A;  POLYP, DIVERTICULOSIS    CYSTOSCOPY URETEROSCOPY Left 12/10/2018    Dr. Ty    HAND SURGERY Left     Ganglion cyst removed palm of left hand    HAND SURGERY  08/21/2019    REPLACEMENT TOTAL KNEE Bilateral     one in 2001 and the other in 2002    SEPTOPLASTY      deviated septum       Past Medical History:   Diagnosis Date    Allergic     CAD (coronary artery disease) 1997    CKD (chronic kidney disease), stage II(HCC) 06/2018    Dr. West    Diabetes mellitus     Diverticulosis 10/2016    DJD (degenerative joint disease)     DVT, lower extremity, recurrent, left 02/2017    Elevated factor VIII level 2018    Factor 5 Leiden mutation, heterozygous     Hyperlipidemia 1997    Hypertension 1997    Pneumonia 2018 January 2018 and June 2018    Renal calculi 09/2016    Sleep apnea 2018    Dr. Grier    Urinary tract infection        Family History   Problem Relation Age of Onset    Lymphoma Brother 42        Non Hodgkins lymphoma    Stroke Brother     Heart attack Other         Extensive MI History on Paternal side    Clotting disorder Sister     Heart attack Father     Heart failure Father     Heart attack Sister     Heart disease Sister     Heart attack Brother     Heart disease Brother     Hypertension Brother     Heart attack Paternal Aunt     Heart attack Paternal Uncle        Social History     Socioeconomic History    Marital status:    Tobacco Use     "Smoking status: Former     Packs/day: 1.50     Years: 2.00     Pack years: 3.00     Types: Cigarettes     Start date: 1978     Quit date: 1980     Years since quittin.6     Passive exposure: Never    Smokeless tobacco: Never    Tobacco comments:     smoked one and a half packs of cigarettes a day for two years quitting at age 36   Vaping Use    Vaping Use: Never used   Substance and Sexual Activity    Alcohol use: Not Currently     Comment: drinks two beers monthly    Drug use: No    Sexual activity: Yes     Partners: Female     Birth control/protection: None           ECG 12 Lead    Date/Time: 2023 11:11 AM  Performed by: Divya Gutierrez MD  Authorized by: Divya Gutierrez MD   Comparison: compared with previous ECG   Similar to previous ECG  Comparison to previous ECG: Normal sinus rhythm normal ECG 68/min normal axis normal intervals no ectopy no significant change from 10/3/2022        Objective:       Physical Exam    /80 (BP Location: Right arm, Patient Position: Sitting, Cuff Size: Large Adult)   Pulse 68   Ht 180.3 cm (71\")   Wt 135 kg (298 lb)   SpO2 96% Comment: RA  BMI 41.56 kg/mý   The patient is alert, oriented and in no distress.    Vital signs as noted above.  Exogenous obesity (BMI 42)    Head and neck revealed no carotid bruits or jugular venous distension.  No thyromegaly or lymphadenopathy is present.    Lungs clear.  No wheezing.  Breath sounds are normal bilaterally.    Heart normal first and second heart sounds.  No murmur..  No pericardial rub is present.  No gallop is present.    Abdomen soft and nontender.  No organomegaly is present.    Extremities revealed good peripheral pulses without any pedal edema.    Skin warm and dry.    Musculoskeletal system is grossly normal.    CNS grossly normal.    Reviewed and updated.        "

## 2023-08-29 ENCOUNTER — OFFICE VISIT (OUTPATIENT)
Dept: CARDIOLOGY | Facility: CLINIC | Age: 68
End: 2023-08-29
Payer: MEDICARE

## 2023-08-29 VITALS
HEART RATE: 68 BPM | DIASTOLIC BLOOD PRESSURE: 80 MMHG | OXYGEN SATURATION: 96 % | SYSTOLIC BLOOD PRESSURE: 149 MMHG | HEIGHT: 71 IN | WEIGHT: 298 LBS | BODY MASS INDEX: 41.72 KG/M2

## 2023-08-29 DIAGNOSIS — M48.02 SPINAL STENOSIS IN CERVICAL REGION: ICD-10-CM

## 2023-08-29 DIAGNOSIS — I48.0 PAROXYSMAL ATRIAL FIBRILLATION: Primary | ICD-10-CM

## 2023-08-29 DIAGNOSIS — I82.402 RECURRENT DEEP VEIN THROMBOSIS (DVT) OF LEFT LOWER EXTREMITY: ICD-10-CM

## 2023-08-29 DIAGNOSIS — N18.30 STAGE 3 CHRONIC KIDNEY DISEASE, UNSPECIFIED WHETHER STAGE 3A OR 3B CKD: ICD-10-CM

## 2023-08-29 DIAGNOSIS — Z79.01 LONG TERM (CURRENT) USE OF ANTICOAGULANTS: ICD-10-CM

## 2023-08-29 PROBLEM — I25.10 CORONARY ARTERY DISEASE: Status: ACTIVE | Noted: 2018-07-10

## 2023-08-29 PROBLEM — Z86.718 HISTORY OF DEEP VENOUS THROMBOSIS: Status: ACTIVE | Noted: 2017-03-16

## 2023-08-29 PROBLEM — M79.89 SWELLING OF LOWER EXTREMITY: Status: ACTIVE | Noted: 2017-03-16

## 2023-08-29 PROBLEM — E78.5 HYPERLIPIDEMIA: Status: ACTIVE | Noted: 2023-08-29

## 2023-08-29 PROBLEM — I10 HYPERTENSION: Status: ACTIVE | Noted: 2023-08-29

## 2024-02-12 ENCOUNTER — LAB (OUTPATIENT)
Dept: LAB | Facility: HOSPITAL | Age: 69
End: 2024-02-12
Payer: MEDICARE

## 2024-02-12 ENCOUNTER — TRANSCRIBE ORDERS (OUTPATIENT)
Dept: ADMINISTRATIVE | Facility: HOSPITAL | Age: 69
End: 2024-02-12
Payer: MEDICARE

## 2024-02-12 DIAGNOSIS — N40.0 ENLARGED PROSTATE: ICD-10-CM

## 2024-02-12 DIAGNOSIS — N40.0 ENLARGED PROSTATE: Primary | ICD-10-CM

## 2024-02-12 LAB — PSA SERPL-MCNC: 3.9 NG/ML (ref 0–4)

## 2024-02-12 PROCEDURE — 36415 COLL VENOUS BLD VENIPUNCTURE: CPT

## 2024-02-12 PROCEDURE — 84153 ASSAY OF PSA TOTAL: CPT

## 2024-02-23 NOTE — PROGRESS NOTES
Encounter Date:03/07/2024  Last seen 8/29/2023      Patient ID: Seth Torres is a 68 y.o. male.    Chief Complaint:  Atrial fibrillation  Coronary artery disease  Anticoagulation management  Renal dysfunction  Hypertension     History of Present Illness    Since I have last seen, the patient has been without any chest discomfort ,shortness of breath, palpitations, dizziness or syncope.  Denies having any headache ,abdominal pain ,nausea, vomiting , diarrhea constipation, loss of weight or loss of appetite.  Denies having any excessive bruising ,hematuria or blood in the stool.    Review of all systems negative except as indicated.    Reviewed ROS.  Assessment and Plan      ]]]]]]]]]]]]]]]]]]]]  History  ==========   -history of atrial fibrillation  converted and maintaining sinus rhythm.  Sinus rhythm-8/29/2023     Echocardiogram showed normal left ventricular function without any pericardial effusion.  June 2018.      Patient had pneumonia and febrile illness associated with atrial atrial fibrillation     -History of pneumonia-left lower lobe seen on chest x-ray as well of conformed by CT scan.      -Chronic coronary artery disease-stable angina pectoris   mild coronary artery disease.  Cardiac catheterization 2012 revealed 50-60% lad disease     -anticoagulation -on Eliquis as long-term treatment for DVT      -Renal dysfunction bun35 cr 2.4  Hypertension dyslipidemia diabetes     - history of DVT Status post thrombectomy.  Status post IVC filter placement     -Exogenous obesity     -allergic to bee venom lopid  =======  Plan  ==========  History of atrial fibrillation  Patient has converted and maintaining sinus rhythm.  EKG showed sinus rhythm without any ischemic changes.-  8/11/2022  EKG 10/3/2022 showed sinus rhythm (reviewed independently and interpreted.)  EKG 8/29/2023-sinus rhythm  EKG 3/7/2024-sinus rhythm     Chronic coronary artery disease  Patient is not having any angina pectoris or congestive  heart failure.     Anticoagulation-on Eliquis.  Patient is taking Eliquis 2.5 mg twice daily (patient apparently has been taking all along.)  Prescribed by hematology/oncologist.  Observe for toxic effects.     CKD 3-Dr. West follow-up.     Hypertension-146/83  Borderline elevation.  Maintain blood pressure log.     Dyslipidemia-continue pravastatin     Diabetes-patient is on Farxiga.  Patient requested samples and was provided.    Medications were reviewed and updated.  patient is off amiodarone  Continue Cardizem and Eliquis  Continue Eliquis as long-term therapy for DVT etc.     Followup in the office in  6 months with EKG     Further plan will depend on patient's progress.     Reviewed and updated-3/7/2024.  [[[[[[[[[[[[[[[[[[         Diagnosis Plan   1. Paroxysmal atrial fibrillation        2. Long term (current) use of anticoagulants        3. Stage 3 chronic kidney disease, unspecified whether stage 3a or 3b CKD        4. Recurrent deep vein thrombosis (DVT) of left lower extremity        LAB RESULTS (LAST 7 DAYS)    CBC        BMP        CMP         BNP        TROPONIN        CoAg        Creatinine Clearance  CrCl cannot be calculated (Patient's most recent lab result is older than the maximum 30 days allowed.).    ABG        Radiology  No radiology results for the last day                The following portions of the patient's history were reviewed and updated as appropriate: allergies, current medications, past family history, past medical history, past social history, past surgical history, and problem list.    Review of Systems   Constitutional: Negative for malaise/fatigue.   Cardiovascular:  Negative for chest pain, dyspnea on exertion, leg swelling and palpitations.   Respiratory:  Negative for cough and shortness of breath.    Gastrointestinal:  Negative for abdominal pain, nausea and vomiting.   Neurological:  Negative for dizziness, focal weakness, headaches, light-headedness and numbness.    All other systems reviewed and are negative.      Current Outpatient Medications:   •  acetaminophen (TYLENOL) 500 MG tablet, Take 1 tablet by mouth Every 6 (Six) Hours As Needed for Mild Pain., Disp: , Rfl:   •  allopurinol (ZYLOPRIM) 100 MG tablet, Take 1 tablet by mouth Daily., Disp: , Rfl:   •  apixaban (ELIQUIS) 5 MG tablet tablet, Take 1 tablet by mouth 2 (Two) Times a Day., Disp: 56 tablet, Rfl: 0  •  benzonatate (TESSALON) 200 MG capsule, Take 1 capsule by mouth 3 (Three) Times a Day As Needed for Cough., Disp: 30 capsule, Rfl: 0  •  cholecalciferol (VITAMIN D3) 25 MCG (1000 UT) tablet, Take 1 tablet by mouth Daily., Disp: , Rfl:   •  cyclobenzaprine (FLEXERIL) 10 MG tablet, Take 1 tablet by mouth 3 (Three) Times a Day As Needed for Muscle Spasms. (Patient not taking: Reported on 8/29/2023), Disp: 30 tablet, Rfl: 0  •  dapagliflozin (Farxiga) 5 MG tablet tablet, Take 1 tablet by mouth Daily., Disp: , Rfl:   •  Dextromethorphan-guaiFENesin (MUCINEX DM MAXIMUM STRENGTH PO), Take  by mouth., Disp: , Rfl:   •  dilTIAZem CD (CARDIZEM CD) 240 MG 24 hr capsule, , Disp: , Rfl:   •  diltiazem XR (DILACOR XR) 240 MG 24 hr capsule, 1 capsule Daily., Disp: , Rfl:   •  Eliquis 5 MG tablet tablet, , Disp: , Rfl:   •  furosemide (LASIX) 40 MG tablet, FUROSEMIDE 40 MG TABS, Disp: , Rfl:   •  hydrALAZINE (APRESOLINE) 25 MG tablet, Take 1 tablet by mouth 2 (Two) Times a Day., Disp: , Rfl:   •  isosorbide mononitrate (IMDUR) 30 MG 24 hr tablet, , Disp: , Rfl:   •  Magnesium Oxide -Mg Supplement 400 MG capsule, 1 capsule Daily., Disp: , Rfl:   •  metFORMIN (FORTAMET) 500 MG (OSM) 24 hr tablet, Daily., Disp: , Rfl:   •  methylPREDNISolone (MEDROL) 4 MG dose pack, Take as directed on package instructions., Disp: 21 tablet, Rfl: 0  •  omeprazole (priLOSEC) 40 MG capsule, Take 1 capsule by mouth Daily., Disp: , Rfl:   •  potassium chloride (KLOR-CON) 20 MEQ packet, Take 20 mEq by mouth Daily., Disp: , Rfl:   •  pravastatin  (PRAVACHOL) 80 MG tablet, Take 1 tablet by mouth Every Night., Disp: , Rfl:     Allergies   Allergen Reactions   • Bee Venom Anaphylaxis   • Gemfibrozil Hives     Lopid        Family History   Problem Relation Age of Onset   • Lymphoma Brother 42        Non Hodgkins lymphoma   • Stroke Brother    • Heart attack Other         Extensive MI History on Paternal side   • Clotting disorder Sister    • Heart attack Father    • Heart failure Father    • Heart attack Sister    • Heart disease Sister    • Heart attack Brother    • Heart disease Brother    • Hypertension Brother    • Heart attack Paternal Aunt    • Heart attack Paternal Uncle        Past Surgical History:   Procedure Laterality Date   • ANTERIOR CERVICAL DISCECTOMY W/ FUSION Bilateral 10/27/2022    Procedure: REOPERATION ANTERIOR CERVICAL DISCECTOMY AND FUSION C3-4 AND C4-5 WITH INTERBODY SPACER, AUTOGRAFT AND ANTERIOR INSTRUMENTATION AND REMOVAL OF ANTERIOR INSTRUMENTATION C5-6;  Surgeon: Juan Manuel Rushing MD;  Location: [facility] MAIN OR;  Service: Neurosurgery;  Laterality: Bilateral;   • CARDIAC CATHETERIZATION     • COLONOSCOPY N/A 11/5/2021    Procedure: COLONOSCOPY WITH POLYPECTOMY X 5;  Surgeon: Ronaldo Forrester MD;  Location: [facility] ENDOSCOPY;  Service: Gastroenterology;  Laterality: N/A;  POLYP, DIVERTICULOSIS   • CYSTOSCOPY URETEROSCOPY Left 12/10/2018    Dr. Ty   • HAND SURGERY Left     Ganglion cyst removed palm of left hand   • HAND SURGERY  08/21/2019   • REPLACEMENT TOTAL KNEE Bilateral     one in 2001 and the other in 2002   • SEPTOPLASTY      deviated septum       Past Medical History:   Diagnosis Date   • Allergic    • CAD (coronary artery disease) 1997   • CKD (chronic kidney disease), stage II(HCC) 06/2018    Dr. West   • Diabetes mellitus    • Diverticulosis 10/2016   • DJD (degenerative joint disease)    • DVT, lower extremity, recurrent, left 02/2017   • Elevated factor VIII level 2018   • Factor 5 Leiden mutation,  heterozygous    • Hyperlipidemia    • Hypertension    • Pneumonia 2018 and 2018   • Renal calculi 2016   • Sleep apnea     Dr. Grier   • Urinary tract infection        Family History   Problem Relation Age of Onset   • Lymphoma Brother 42        Non Hodgkins lymphoma   • Stroke Brother    • Heart attack Other         Extensive MI History on Paternal side   • Clotting disorder Sister    • Heart attack Father    • Heart failure Father    • Heart attack Sister    • Heart disease Sister    • Heart attack Brother    • Heart disease Brother    • Hypertension Brother    • Heart attack Paternal Aunt    • Heart attack Paternal Uncle        Social History     Socioeconomic History   • Marital status:    Tobacco Use   • Smoking status: Former     Packs/day: 1.50     Years: 2.00     Additional pack years: 0.00     Total pack years: 3.00     Types: Cigarettes     Start date: 1978     Quit date: 1980     Years since quittin.1     Passive exposure: Past   • Smokeless tobacco: Never   • Tobacco comments:     smoked one and a half packs of cigarettes a day for two years quitting at age 36   Vaping Use   • Vaping Use: Never used   Substance and Sexual Activity   • Alcohol use: Not Currently     Comment: drinks two beers monthly   • Drug use: No   • Sexual activity: Yes     Partners: Female     Birth control/protection: None           ECG 12 Lead    Date/Time: 3/7/2024 10:51 AM  Performed by: Divya Gutierrez MD    Authorized by: Divya Gutierrez MD  Comparison: compared with previous ECG   Similar to previous ECG  Comparison to previous ECG: Normal sinus rhythm normal ECG 64/min normal axis normal intervals no ectopy no significant change from previous EKG.        Objective:       Physical Exam    There were no vitals taken for this visit.  The patient is alert, oriented and in no distress.    Vital signs as noted above.  Exogenous obesity (BMI 42)    Head and neck revealed  no carotid bruits or jugular venous distension.  No thyromegaly or lymphadenopathy is present.    Lungs clear.  No wheezing.  Breath sounds are normal bilaterally.    Heart normal first and second heart sounds.  No murmur..  No pericardial rub is present.  No gallop is present.    Abdomen soft and nontender.  No organomegaly is present.    Extremities revealed good peripheral pulses without any pedal edema.    Skin warm and dry.    Musculoskeletal system is grossly normal.    CNS grossly normal.    Reviewed and updated.

## 2024-03-07 ENCOUNTER — OFFICE VISIT (OUTPATIENT)
Dept: CARDIOLOGY | Facility: CLINIC | Age: 69
End: 2024-03-07
Payer: MEDICARE

## 2024-03-07 VITALS
SYSTOLIC BLOOD PRESSURE: 146 MMHG | HEIGHT: 71 IN | WEIGHT: 301 LBS | OXYGEN SATURATION: 96 % | DIASTOLIC BLOOD PRESSURE: 83 MMHG | HEART RATE: 64 BPM | BODY MASS INDEX: 42.14 KG/M2

## 2024-03-07 DIAGNOSIS — N18.30 STAGE 3 CHRONIC KIDNEY DISEASE, UNSPECIFIED WHETHER STAGE 3A OR 3B CKD: ICD-10-CM

## 2024-03-07 DIAGNOSIS — I48.0 PAROXYSMAL ATRIAL FIBRILLATION: Primary | ICD-10-CM

## 2024-03-07 DIAGNOSIS — I82.402 RECURRENT DEEP VEIN THROMBOSIS (DVT) OF LEFT LOWER EXTREMITY: ICD-10-CM

## 2024-03-07 DIAGNOSIS — Z79.01 LONG TERM (CURRENT) USE OF ANTICOAGULANTS: ICD-10-CM

## 2024-03-18 ENCOUNTER — APPOINTMENT (OUTPATIENT)
Dept: GENERAL RADIOLOGY | Facility: HOSPITAL | Age: 69
End: 2024-03-18
Payer: MEDICARE

## 2024-03-18 ENCOUNTER — TELEPHONE (OUTPATIENT)
Dept: CARDIOLOGY | Facility: CLINIC | Age: 69
End: 2024-03-18
Payer: MEDICARE

## 2024-03-18 ENCOUNTER — HOSPITAL ENCOUNTER (EMERGENCY)
Facility: HOSPITAL | Age: 69
Discharge: HOME | End: 2024-03-18
Attending: EMERGENCY MEDICINE | Admitting: EMERGENCY MEDICINE
Payer: MEDICARE

## 2024-03-18 VITALS
TEMPERATURE: 98 F | HEIGHT: 71 IN | RESPIRATION RATE: 16 BRPM | DIASTOLIC BLOOD PRESSURE: 70 MMHG | HEART RATE: 80 BPM | OXYGEN SATURATION: 96 % | WEIGHT: 289 LBS | SYSTOLIC BLOOD PRESSURE: 120 MMHG | BODY MASS INDEX: 40.46 KG/M2

## 2024-03-18 DIAGNOSIS — I48.91 ATRIAL FIBRILLATION, UNSPECIFIED TYPE: Primary | ICD-10-CM

## 2024-03-18 DIAGNOSIS — R00.2 PALPITATIONS: ICD-10-CM

## 2024-03-18 LAB
ALBUMIN SERPL-MCNC: 4.5 G/DL (ref 3.5–5.2)
ALBUMIN/GLOB SERPL: 1.6 G/DL
ALP SERPL-CCNC: 115 U/L (ref 39–117)
ALT SERPL W P-5'-P-CCNC: 46 U/L (ref 1–41)
ANION GAP SERPL CALCULATED.3IONS-SCNC: 14 MMOL/L (ref 5–15)
APTT PPP: 27.6 SECONDS (ref 24–31)
AST SERPL-CCNC: 34 U/L (ref 1–40)
BASOPHILS # BLD AUTO: 0.03 10*3/MM3 (ref 0–0.2)
BASOPHILS NFR BLD AUTO: 0.5 % (ref 0–1.5)
BILIRUB SERPL-MCNC: 0.5 MG/DL (ref 0–1.2)
BUN SERPL-MCNC: 13 MG/DL (ref 8–23)
BUN/CREAT SERPL: 9.3 (ref 7–25)
CALCIUM SPEC-SCNC: 9 MG/DL (ref 8.6–10.5)
CHLORIDE SERPL-SCNC: 101 MMOL/L (ref 98–107)
CO2 SERPL-SCNC: 24 MMOL/L (ref 22–29)
CREAT SERPL-MCNC: 1.4 MG/DL (ref 0.76–1.27)
DEPRECATED RDW RBC AUTO: 44.5 FL (ref 37–54)
EGFRCR SERPLBLD CKD-EPI 2021: 55.1 ML/MIN/1.73
EOSINOPHIL # BLD AUTO: 0.2 10*3/MM3 (ref 0–0.4)
EOSINOPHIL NFR BLD AUTO: 3.1 % (ref 0.3–6.2)
ERYTHROCYTE [DISTWIDTH] IN BLOOD BY AUTOMATED COUNT: 15.1 % (ref 12.3–15.4)
GLOBULIN UR ELPH-MCNC: 2.9 GM/DL
GLUCOSE SERPL-MCNC: 222 MG/DL (ref 65–99)
HCT VFR BLD AUTO: 57.7 % (ref 37.5–51)
HGB BLD-MCNC: 18.2 G/DL (ref 13–17.7)
HOLD SPECIMEN: NORMAL
IMM GRANULOCYTES # BLD AUTO: 0.02 10*3/MM3 (ref 0–0.05)
IMM GRANULOCYTES NFR BLD AUTO: 0.3 % (ref 0–0.5)
INR PPP: 1.02 (ref 0.93–1.1)
LYMPHOCYTES # BLD AUTO: 1.46 10*3/MM3 (ref 0.7–3.1)
LYMPHOCYTES NFR BLD AUTO: 22.9 % (ref 19.6–45.3)
MCH RBC QN AUTO: 27 PG (ref 26.6–33)
MCHC RBC AUTO-ENTMCNC: 31.5 G/DL (ref 31.5–35.7)
MCV RBC AUTO: 85.5 FL (ref 79–97)
MONOCYTES # BLD AUTO: 0.64 10*3/MM3 (ref 0.1–0.9)
MONOCYTES NFR BLD AUTO: 10 % (ref 5–12)
NEUTROPHILS NFR BLD AUTO: 4.03 10*3/MM3 (ref 1.7–7)
NEUTROPHILS NFR BLD AUTO: 63.2 % (ref 42.7–76)
NRBC BLD AUTO-RTO: 0 /100 WBC (ref 0–0.2)
NT-PROBNP SERPL-MCNC: 200.6 PG/ML (ref 0–900)
PLATELET # BLD AUTO: 176 10*3/MM3 (ref 140–450)
PMV BLD AUTO: 10.7 FL (ref 6–12)
POTASSIUM SERPL-SCNC: 4.1 MMOL/L (ref 3.5–5.2)
PROT SERPL-MCNC: 7.4 G/DL (ref 6–8.5)
PROTHROMBIN TIME: 11.1 SECONDS (ref 9.6–11.7)
RBC # BLD AUTO: 6.75 10*6/MM3 (ref 4.14–5.8)
SODIUM SERPL-SCNC: 139 MMOL/L (ref 136–145)
TROPONIN T SERPL HS-MCNC: 18 NG/L
WBC NRBC COR # BLD AUTO: 6.38 10*3/MM3 (ref 3.4–10.8)
WHOLE BLOOD HOLD COAG: NORMAL

## 2024-03-18 PROCEDURE — 96365 THER/PROPH/DIAG IV INF INIT: CPT

## 2024-03-18 PROCEDURE — 93005 ELECTROCARDIOGRAM TRACING: CPT | Performed by: EMERGENCY MEDICINE

## 2024-03-18 PROCEDURE — 80053 COMPREHEN METABOLIC PANEL: CPT | Performed by: PHYSICIAN ASSISTANT

## 2024-03-18 PROCEDURE — 83880 ASSAY OF NATRIURETIC PEPTIDE: CPT | Performed by: PHYSICIAN ASSISTANT

## 2024-03-18 PROCEDURE — 93005 ELECTROCARDIOGRAM TRACING: CPT

## 2024-03-18 PROCEDURE — 85730 THROMBOPLASTIN TIME PARTIAL: CPT | Performed by: EMERGENCY MEDICINE

## 2024-03-18 PROCEDURE — 96366 THER/PROPH/DIAG IV INF ADDON: CPT

## 2024-03-18 PROCEDURE — 85610 PROTHROMBIN TIME: CPT | Performed by: EMERGENCY MEDICINE

## 2024-03-18 PROCEDURE — 71045 X-RAY EXAM CHEST 1 VIEW: CPT

## 2024-03-18 PROCEDURE — 85025 COMPLETE CBC W/AUTO DIFF WBC: CPT | Performed by: PHYSICIAN ASSISTANT

## 2024-03-18 PROCEDURE — 84484 ASSAY OF TROPONIN QUANT: CPT | Performed by: PHYSICIAN ASSISTANT

## 2024-03-18 PROCEDURE — 99284 EMERGENCY DEPT VISIT MOD MDM: CPT

## 2024-03-18 PROCEDURE — 96376 TX/PRO/DX INJ SAME DRUG ADON: CPT

## 2024-03-18 RX ORDER — DILTIAZEM HYDROCHLORIDE 5 MG/ML
10 INJECTION INTRAVENOUS ONCE
Status: DISCONTINUED | OUTPATIENT
Start: 2024-03-18 | End: 2024-03-18

## 2024-03-18 RX ORDER — METOPROLOL SUCCINATE 25 MG/1
25 TABLET, EXTENDED RELEASE ORAL DAILY
Qty: 30 TABLET | Refills: 0 | Status: SHIPPED | OUTPATIENT
Start: 2024-03-18

## 2024-03-18 RX ORDER — DILTIAZEM HCL/D5W 125 MG/125
5-15 PLASTIC BAG, INJECTION (ML) INTRAVENOUS
Status: DISCONTINUED | OUTPATIENT
Start: 2024-03-18 | End: 2024-03-18 | Stop reason: HOSPADM

## 2024-03-18 RX ORDER — SODIUM CHLORIDE 0.9 % (FLUSH) 0.9 %
10 SYRINGE (ML) INJECTION AS NEEDED
Status: DISCONTINUED | OUTPATIENT
Start: 2024-03-18 | End: 2024-03-18 | Stop reason: HOSPADM

## 2024-03-18 RX ORDER — DILTIAZEM HYDROCHLORIDE 5 MG/ML
20 INJECTION INTRAVENOUS ONCE
Status: COMPLETED | OUTPATIENT
Start: 2024-03-18 | End: 2024-03-18

## 2024-03-18 RX ADMIN — DILTIAZEM HYDROCHLORIDE 20 MG: 5 INJECTION, SOLUTION INTRAVENOUS at 10:47

## 2024-03-18 RX ADMIN — Medication 5 MG/HR: at 11:49

## 2024-03-18 NOTE — ED PROVIDER NOTES
Subjective   History of Present Illness  Chief Complaint: Palpitations    Patient is a 68-year-old male history of CAD, CKD, factor V Leiden, history of A-fib on Eliquis presents to the ER with complaints of palpitations lightheadedness diaphoresis since yesterday.  Patient states that he felt off yesterday but then symptoms resolved on their own.  Patient states this morning he was eating breakfast when he became diaphoretic lightheaded and felt like his heart was flipping.  He states that he checked his pulse at home and it was in the 150s.  He denies chest pain or shortness of breath.  Mild nonproductive cough.  No headache lightheadedness or dizziness.  No abdominal pain nausea vomiting or diarrhea.  No new lower extremity swelling.  Patient states he has been compliant with his medications.    PCP: Myra Tello  Cardio: Brenda    History provided by:  Patient      Review of Systems   Constitutional:  Negative for fever.   HENT:  Negative for congestion, sore throat and trouble swallowing.    Eyes: Negative.    Respiratory:  Positive for chest tightness. Negative for shortness of breath and wheezing.    Cardiovascular:  Positive for palpitations. Negative for chest pain.   Gastrointestinal:  Negative for abdominal pain, diarrhea, nausea and vomiting.   Endocrine: Negative.    Genitourinary:  Negative for dysuria.   Musculoskeletal:  Negative for myalgias.   Skin:  Negative for rash.   Allergic/Immunologic: Negative.    Neurological:  Positive for headaches. Negative for numbness.   Psychiatric/Behavioral:  Negative for behavioral problems.    All other systems reviewed and are negative.      Past Medical History:   Diagnosis Date    Allergic     CAD (coronary artery disease) 1997    CKD (chronic kidney disease), stage II(HCC) 06/2018    Dr. West    Diabetes mellitus     Diverticulosis 10/2016    DJD (degenerative joint disease)     DVT, lower extremity, recurrent, left 02/2017    Elevated factor VIII level  2018    Factor 5 Leiden mutation, heterozygous     Hyperlipidemia 1997    Hypertension 1997    Pneumonia 2018 January 2018 and June 2018    Renal calculi 09/2016    Sleep apnea 2018    Dr. Grier    Urinary tract infection        Allergies   Allergen Reactions    Bee Venom Anaphylaxis    Gemfibrozil Hives     Lopid        Past Surgical History:   Procedure Laterality Date    ANTERIOR CERVICAL DISCECTOMY W/ FUSION Bilateral 10/27/2022    Procedure: REOPERATION ANTERIOR CERVICAL DISCECTOMY AND FUSION C3-4 AND C4-5 WITH INTERBODY SPACER, AUTOGRAFT AND ANTERIOR INSTRUMENTATION AND REMOVAL OF ANTERIOR INSTRUMENTATION C5-6;  Surgeon: Juan Manuel Rushing MD;  Location: [facility] MAIN OR;  Service: Neurosurgery;  Laterality: Bilateral;    CARDIAC CATHETERIZATION      COLONOSCOPY N/A 11/5/2021    Procedure: COLONOSCOPY WITH POLYPECTOMY X 5;  Surgeon: Ronaldo Forrester MD;  Location: [facility] ENDOSCOPY;  Service: Gastroenterology;  Laterality: N/A;  POLYP, DIVERTICULOSIS    CYSTOSCOPY URETEROSCOPY Left 12/10/2018    Dr. Ty    HAND SURGERY Left     Ganglion cyst removed palm of left hand    HAND SURGERY  08/21/2019    REPLACEMENT TOTAL KNEE Bilateral     one in 2001 and the other in 2002    SEPTOPLASTY      deviated septum       Family History   Problem Relation Age of Onset    Lymphoma Brother 42        Non Hodgkins lymphoma    Stroke Brother     Heart attack Other         Extensive MI History on Paternal side    Clotting disorder Sister     Heart attack Father     Heart failure Father     Heart attack Sister     Heart disease Sister     Heart attack Brother     Heart disease Brother     Hypertension Brother     Heart attack Paternal Aunt     Heart attack Paternal Uncle        Social History     Socioeconomic History    Marital status:    Tobacco Use    Smoking status: Former     Current packs/day: 0.00     Average packs/day: 1.5 packs/day for 2.6 years (3.9 ttl pk-yrs)     Types: Cigarettes     Start date:  1978     Quit date: 1980     Years since quittin.2     Passive exposure: Past    Smokeless tobacco: Never    Tobacco comments:     smoked one and a half packs of cigarettes a day for two years quitting at age 36   Vaping Use    Vaping status: Never Used   Substance and Sexual Activity    Alcohol use: Not Currently     Comment: drinks two beers monthly    Drug use: No    Sexual activity: Yes     Partners: Female     Birth control/protection: None           Objective   Physical Exam  Vitals and nursing note reviewed.   Constitutional:       Appearance: Normal appearance. He is normal weight.   HENT:      Mouth/Throat:      Mouth: Mucous membranes are moist.   Eyes:      Extraocular Movements: Extraocular movements intact.      Pupils: Pupils are equal, round, and reactive to light.   Cardiovascular:      Rate and Rhythm: Tachycardia present. Rhythm irregular.      Pulses: Normal pulses.      Heart sounds: Normal heart sounds. No murmur heard.  Pulmonary:      Effort: Pulmonary effort is normal.      Breath sounds: Normal breath sounds. No wheezing.   Abdominal:      General: Abdomen is flat.      Palpations: Abdomen is soft.   Musculoskeletal:         General: Swelling present.      Comments: Left leg swelling, patient reports this is chronic   Skin:     General: Skin is warm.      Capillary Refill: Capillary refill takes less than 2 seconds.   Neurological:      General: No focal deficit present.      Mental Status: He is alert and oriented to person, place, and time.   Psychiatric:         Mood and Affect: Mood normal.         Behavior: Behavior normal.         ECG 12 Lead      Date/Time: 3/18/2024 9:54 PM    Performed by: Mary Serra PA  Authorized by: Kirk Kaplan DO  Interpreted by ED physician  Comparison: compared with previous ECG   Similar to previous ECG  Rhythm: sinus tachycardia and atrial fibrillation  Rate: tachycardic  BPM: 145  Conduction: conduction normal  Clinical  "impression: abnormal ECG    ECG 12 Lead      Date/Time: 3/18/2024 9:55 PM    Performed by: Mary Serra PA  Authorized by: Kirk Kaplan DO  Interpreted by ED physician  Comparison: compared with previous ECG   Comparison to previous ECG: Previously A-fib  Rhythm: sinus rhythm  Rate: normal  BPM: 83  QRS axis: normal  Clinical impression: non-specific ECG               ED Course  ED Course as of 03/18/24 2158   Mon Mar 18, 2024   1142 Heart rate improved to the 80s briefly but now 1 teens still atrial fibrillation []   1316 Consult placed to Dr. Tello []   1319 I spoke with Dr. Tello, recommended calling Dr. Gutierrez []   1340 I spoke with Dr. Gutierrez, agreed with plan for discontinuing infusion and monitoring the patient.  If patient stays in sinus rhythm patient can be discharged home recommended starting patient on metoprolol 25 mg XL []      ED Course User Index  [] Mary Serra PA    /70   Pulse 80   Temp 98 °F (36.7 °C)   Resp 16   Ht 180.3 cm (71\")   Wt 131 kg (289 lb)   SpO2 96%   BMI 40.31 kg/m²   Labs Reviewed   COMPREHENSIVE METABOLIC PANEL - Abnormal; Notable for the following components:       Result Value    Glucose 222 (*)     Creatinine 1.40 (*)     ALT (SGPT) 46 (*)     eGFR 55.1 (*)     All other components within normal limits    Narrative:     GFR Normal >60  Chronic Kidney Disease <60  Kidney Failure <15     CBC WITH AUTO DIFFERENTIAL - Abnormal; Notable for the following components:    RBC 6.75 (*)     Hemoglobin 18.2 (*)     Hematocrit 57.7 (*)     All other components within normal limits   SINGLE HSTROPONIN T - Normal    Narrative:     High Sensitive Troponin T Reference Range:  <14.0 ng/L- Negative Female for AMI  <22.0 ng/L- Negative Male for AMI  >=14 - Abnormal Female indicating possible myocardial injury.  >=22 - Abnormal Male indicating possible myocardial injury.   Clinicians would have to utilize clinical acumen, EKG, Troponin, and serial changes " to determine if it is an Acute Myocardial Infarction or myocardial injury due to an underlying chronic condition.        BNP (IN-HOUSE) - Normal    Narrative:     This assay is used as an aid in the diagnosis of individuals suspected of having heart failure. It can be used as an aid in the diagnosis of acute decompensated heart failure (ADHF) in patients presenting with signs and symptoms of ADHF to the emergency department (ED). In addition, NT-proBNP of <300 pg/mL indicates ADHF is not likely.    Age Range Result Interpretation  NT-proBNP Concentration (pg/mL:      <50             Positive            >450                   Gray                 300-450                    Negative             <300    50-75           Positive            >900                  Gray                300-900                  Negative            <300      >75             Positive            >1800                  Gray                300-1800                  Negative            <300   PROTIME-INR - Normal   APTT - Normal   CBC AND DIFFERENTIAL    Narrative:     The following orders were created for panel order CBC & Differential.  Procedure                               Abnormality         Status                     ---------                               -----------         ------                     CBC Auto Differential[[id number]]        Abnormal            Final result                 Please view results for these tests on the individual orders.   EXTRA TUBES    Narrative:     The following orders were created for panel order Extra Tubes.  Procedure                               Abnormality         Status                     ---------                               -----------         ------                     Gold Top - SST[[id number]]                                   Final result                 Please view results for these tests on the individual orders.   GOLD TOP - SST   EXTRA TUBES    Narrative:     The following orders were  created for panel order Extra Tubes.  Procedure                               Abnormality         Status                     ---------                               -----------         ------                     Light Blue Top[[id number]]                                   Final result                 Please view results for these tests on the individual orders.   LIGHT BLUE TOP     Medications   dilTIAZem (CARDIZEM) injection 20 mg (20 mg Intravenous Given 3/18/24 1047)     XR Chest 1 View    Result Date: 3/18/2024  Impression: No acute pulmonary process Electronically Signed: Daniel Camp MD  3/18/2024 11:10 AM EDT  Workstation ID: [id number]                                            Medical Decision Making  Differential Dx (Includes but not limited to): A-fib dysrhythmia electrolyte abnormality  Medical Records Reviewed: Patient seen by cardiology 3/7/2024 for follow-up regarding paroxysmal A-fib.  Per note cardiac catheterization 2012 revealed 56% LAD disease.  June 2018 echo showed normal left ventricular function without any pericardial effusion.  Labs: On my interpretation CBC no leukocytosis.  BMP unremarkable troponin 18.  CMP glucose 222 creatinine 1.4  Imaging:, Interpretation chest x-ray shows no obvious pneumonia  Telemetry: EKG interpretation: Reviewed by myself interpreted by ER attending, atrial fibrillation rate of 145, no acute ST changes.  Repeat EKG after treatment shows sinus rhythm rate of 83.  Testing considered but not ordered: CT head patient denies headache or head injury  Nature of Complaint: Acute  Admission vs Discharge: Discharge  Discussion: While in the ED IV was placed and labs were obtained appropriate PPE was worn during exam and throughout all encounters with the patient.  Patient had the above evaluation.  He is afebrile and nontoxic appearance in no acute distress.  Initial EKG shows atrial fibrillation with a rate of 145.  Patient started on Cardizem bolus of 20 mg followed by  Cardizem infusion.  Patient did ultimately convert to normal sinus rhythm with a rate of 80.  This is confirmed by EKG.  Patient reports that he feels much better.  Infusion was discontinued and patient was monitored for some time and maintained sinus rhythm.  I spoke with patient's PCP Dr. Tello and cardiologist Dr. Gutierrez, states that patient can be discharged home with new prescription for metoprolol due to breakthrough A-fib although patient is currently on Cardizem and Eliquis.  Patient remained asymptomatic after heart rhythm converted back to normal sinus rhythm.  Patient agreeable with plan of discharge.  Prescription sent to pharmacy.  Patient advised to follow-up with PCP and cardiology for recheck.    Discharge plan and instructions were discussed with the patient who verbalized understanding and is in agreement with the plan, all questions were answered at this time.  Patient is aware of signs symptoms that would require immediate return to the emergency room.  Patient understands importance of following up with primary care provider for further evaluation and worsening concerns as well as blood pressure recheck in the next 4 weeks.    Patient was discharged in improved stable condition with an upright steady gait.    Patient is aware that discharge does not mean that nothing is wrong but indicates no emergencies present and they must continue care with follow-up as given below or physician of their choice.    Problems Addressed:  Atrial fibrillation, unspecified type: acute illness or injury  Palpitations: acute illness or injury    Amount and/or Complexity of Data Reviewed  External Data Reviewed: notes.  Labs: ordered. Decision-making details documented in ED Course.  Radiology: ordered. Decision-making details documented in ED Course.  ECG/medicine tests: ordered. Decision-making details documented in ED Course.    Risk  Prescription drug management.        Final diagnoses:   Atrial fibrillation,  unspecified type   Palpitations       ED Disposition  ED Disposition       ED Disposition   Discharge    Condition   Stable    Comment   --               Myra Tello MD  [address] [facility] [address]  [place] [address] [phone]    Schedule an appointment as soon as possible for a visit in 2 days  As needed, If symptoms worsen    Divya Gutierrez MD  [address] [facility] [place] [address] [phone]    Schedule an appointment as soon as possible for a visit in 2 days  As needed, If symptoms worsen         Medication List        New Prescriptions      metoprolol succinate XL 25 MG 24 hr tablet  Commonly known as: TOPROL-XL  Take 1 tablet by mouth Daily.               Where to Get Your Medications        These medications were sent to [facility] [address] [place] [address]      Hours: Monday to Friday 7 AM to 7 PM Phone: [phone]   metoprolol succinate XL 25 MG 24 hr tablet            Mary Serra PA  03/18/24 3745

## 2024-03-18 NOTE — TELEPHONE ENCOUNTER
"Caller: Seth Torres \"Nathanael\"    Relationship to patient: Self    Best call back number: [phone]    Chief complaint: AFIB    Type of visit: HOSPITAL FOLLOW UP    Requested date: 2 DAYS      PATIENT WAS SEEN IN ER, DISCHARGE NOTES SAYS TO FOLLOW UP IN 2 DAYS.  "

## 2024-03-18 NOTE — DISCHARGE INSTRUCTIONS
Take medications as prescribed.  Start metoprolol tomorrow    Monitor your blood pressure, if it drops too low we may need to adjust one of your blood pressure medications    Follow-up with Dr. Gutierrez    Return to the ER for new or worsening symptoms

## 2024-03-19 LAB
QT INTERVAL: 296 MS
QT INTERVAL: 368 MS
QTC INTERVAL: 434 MS
QTC INTERVAL: 459 MS

## 2024-03-20 ENCOUNTER — OFFICE VISIT (OUTPATIENT)
Dept: CARDIOLOGY | Facility: CLINIC | Age: 69
End: 2024-03-20
Payer: MEDICARE

## 2024-03-20 VITALS
OXYGEN SATURATION: 94 % | DIASTOLIC BLOOD PRESSURE: 83 MMHG | SYSTOLIC BLOOD PRESSURE: 130 MMHG | BODY MASS INDEX: 42 KG/M2 | HEART RATE: 64 BPM | WEIGHT: 300 LBS | HEIGHT: 71 IN

## 2024-03-20 DIAGNOSIS — Z79.01 CHRONIC ANTICOAGULATION: ICD-10-CM

## 2024-03-20 DIAGNOSIS — I82.402 RECURRENT DEEP VEIN THROMBOSIS (DVT) OF LEFT LOWER EXTREMITY: ICD-10-CM

## 2024-03-20 DIAGNOSIS — I48.0 PAROXYSMAL ATRIAL FIBRILLATION: Primary | ICD-10-CM

## 2024-03-20 DIAGNOSIS — N18.30 STAGE 3 CHRONIC KIDNEY DISEASE, UNSPECIFIED WHETHER STAGE 3A OR 3B CKD: ICD-10-CM

## 2024-03-20 DIAGNOSIS — Z79.01 LONG TERM (CURRENT) USE OF ANTICOAGULANTS: ICD-10-CM

## 2024-03-20 NOTE — PROGRESS NOTES
Encounter Date:03/20/2024  Last seen 3/7/2024      Patient ID: Seth Torres is a 68 y.o. male.        Chief Complaint:  Atrial fibrillation  Coronary artery disease  Anticoagulation management  Renal dysfunction  Hypertension     History of Present Illness  3/18/2024 patient woke up with palpitations and his heart rate was 160.  Patient went to the emergency room and was noted to have atrial fibrillation with RVR.  Patient was given intravenous Cardizem and has converted to sinus rhythm.  Patient was started on metoprolol.    Since the time of discharge, the patient has been without any chest discomfort ,shortness of breath, palpitations, dizziness or syncope.  Denies having any headache ,abdominal pain ,nausea, vomiting , diarrhea constipation, loss of weight or loss of appetite.  Denies having any excessive bruising ,hematuria or blood in the stool.     Review of all systems negative except as indicated.     Reviewed ROS.  Assessment and Plan      ]]]]]]]]]]]]]]]]]]]]  History  ==========   -history of atrial fibrillation  converted and maintaining sinus rhythm.  Sinus rhythm-8/29/2023     Echocardiogram showed normal left ventricular function without any pericardial effusion.  June 2018.      Patient had pneumonia and febrile illness associated with atrial atrial fibrillation     -History of pneumonia-left lower lobe seen on chest x-ray as well of conformed by CT scan.      -Chronic coronary artery disease-stable angina pectoris   mild coronary artery disease.  Cardiac catheterization 2012 revealed 50-60% lad disease     -anticoagulation -on Eliquis as long-term treatment for DVT      -Renal dysfunction bun35 cr 2.4  Hypertension dyslipidemia diabetes     - history of DVT Status post thrombectomy.  Status post IVC filter placement     -Exogenous obesity     -allergic to bee venom lopid  =======  Plan  ==========  History of atrial fibrillation  Patient has converted and maintaining sinus rhythm.  EKG showed  sinus rhythm without any ischemic changes.-  8/11/2022  EKG 10/3/2022 showed sinus rhythm (reviewed independently and interpreted.)  EKG 8/29/2023-sinus rhythm  EKG 3/7/2024-sinus rhythm  EKG 3/18/2024-atrial fibrillation-converted to sinus rhythm.    3/18/2024 patient woke up with palpitations and his heart rate was 160.  Patient went to the emergency room and was noted to have atrial fibrillation with RVR.  Patient was given intravenous Cardizem and has converted to sinus rhythm.  Patient was started on metoprolol.     Chronic coronary artery disease  Patient is not having any angina pectoris or congestive heart failure.     Anticoagulation-on Eliquis.  Patient is taking Eliquis 2.5 mg twice daily (patient apparently has been taking all along.)  Prescribed by hematology/oncologist.  Observe for toxic effects.     CKD 3-Dr. West follow-up.     Hypertension-130/83  Well-controlled.    Dyslipidemia-continue pravastatin     Diabetes-patient is on Farxiga.    Medications were reviewed and updated.  patient is off amiodarone  Continue Cardizem and Eliquis  Continue Eliquis as long-term therapy for DVT etc.  Patient is on metoprolol.     Followup in the office at her regularly scheduled appointment    Further plan will depend on patient's progress.     Reviewed and updated-3/20/2024.  [[[[[[[[[[[[[[[[[[            Diagnosis Plan   1. Paroxysmal atrial fibrillation        2. Recurrent deep vein thrombosis (DVT) of left lower extremity        3. Long term (current) use of anticoagulants        4. Stage 3 chronic kidney disease, unspecified whether stage 3a or 3b CKD        5. Chronic anticoagulation        LAB RESULTS (LAST 7 DAYS)    CBC  Results from last 7 days   Lab Units 03/18/24  1041   WBC 10*3/mm3 6.38   RBC 10*6/mm3 6.75*   HEMOGLOBIN g/dL 18.2*   HEMATOCRIT % 57.7*   MCV fL 85.5   PLATELETS 10*3/mm3 176       BMP  Results from last 7 days   Lab Units 03/18/24  1041   SODIUM mmol/L 139   POTASSIUM mmol/L 4.1    CHLORIDE mmol/L 101   CO2 mmol/L 24.0   BUN mg/dL 13   CREATININE mg/dL 1.40*   GLUCOSE mg/dL 222*       CMP   Results from last 7 days   Lab Units 03/18/24  1041   SODIUM mmol/L 139   POTASSIUM mmol/L 4.1   CHLORIDE mmol/L 101   CO2 mmol/L 24.0   BUN mg/dL 13   CREATININE mg/dL 1.40*   GLUCOSE mg/dL 222*   ALBUMIN g/dL 4.5   BILIRUBIN mg/dL 0.5   ALK PHOS U/L 115   AST (SGOT) U/L 34   ALT (SGPT) U/L 46*         BNP        TROPONIN  Results from last 7 days   Lab Units 03/18/24  1041   HSTROP T ng/L 18       CoAg  Results from last 7 days   Lab Units 03/18/24  1123   INR  1.02   APTT seconds 27.6       Creatinine Clearance  Estimated Creatinine Clearance: 72.1 mL/min (A) (by C-G formula based on SCr of 1.4 mg/dL (H)).    ABG        Radiology  No radiology results for the last day                The following portions of the patient's history were reviewed and updated as appropriate: allergies, current medications, past family history, past medical history, past social history, past surgical history, and problem list.    Review of Systems   Constitutional: Positive for malaise/fatigue.   Cardiovascular:  Negative for chest pain, dyspnea on exertion, leg swelling and palpitations.   Respiratory:  Negative for cough and shortness of breath.    Gastrointestinal:  Negative for abdominal pain, nausea and vomiting.   Neurological:  Negative for dizziness, focal weakness, headaches, light-headedness and numbness.   All other systems reviewed and are negative.        Current Outpatient Medications:     acetaminophen (TYLENOL) 500 MG tablet, Take 1 tablet by mouth Every 6 (Six) Hours As Needed for Mild Pain., Disp: , Rfl:     allopurinol (ZYLOPRIM) 100 MG tablet, Take 1 tablet by mouth Daily., Disp: , Rfl:     apixaban (ELIQUIS) 2.5 MG tablet tablet, Take 1 tablet by mouth 2 (Two) Times a Day., Disp: 70 tablet, Rfl: 0    cholecalciferol (VITAMIN D3) 25 MCG (1000 UT) tablet, Take 1 tablet by mouth Daily., Disp: , Rfl:      cyclobenzaprine (FLEXERIL) 10 MG tablet, Take 1 tablet by mouth 3 (Three) Times a Day As Needed for Muscle Spasms., Disp: 30 tablet, Rfl: 0    dapagliflozin (Farxiga) 5 MG tablet tablet, Take 1 tablet by mouth Daily., Disp: , Rfl:     dilTIAZem CD (CARDIZEM CD) 240 MG 24 hr capsule, 1 capsule Daily., Disp: , Rfl:     furosemide (LASIX) 40 MG tablet, Take 1 tablet by mouth Daily., Disp: , Rfl:     hydrALAZINE (APRESOLINE) 25 MG tablet, Take 1 tablet by mouth 2 (Two) Times a Day., Disp: , Rfl:     isosorbide mononitrate (IMDUR) 30 MG 24 hr tablet, Take 1 tablet by mouth Daily., Disp: , Rfl:     Magnesium Oxide -Mg Supplement 400 MG capsule, 1 capsule Daily., Disp: , Rfl:     metFORMIN (FORTAMET) 500 MG (OSM) 24 hr tablet, Take 1 tablet by mouth Daily With Breakfast., Disp: , Rfl:     metoprolol succinate XL (TOPROL-XL) 25 MG 24 hr tablet, Take 1 tablet by mouth Daily., Disp: 30 tablet, Rfl: 0    omeprazole (priLOSEC) 40 MG capsule, Take 1 capsule by mouth Daily., Disp: , Rfl:     potassium chloride (KLOR-CON) 20 MEQ packet, Take 20 mEq by mouth Daily., Disp: , Rfl:     pravastatin (PRAVACHOL) 80 MG tablet, Take 1 tablet by mouth Every Night., Disp: , Rfl:     Allergies   Allergen Reactions    Bee Venom Anaphylaxis    Gemfibrozil Hives     Lopid        Family History   Problem Relation Age of Onset    Lymphoma Brother 42        Non Hodgkins lymphoma    Stroke Brother     Heart attack Other         Extensive MI History on Paternal side    Clotting disorder Sister     Heart attack Father     Heart failure Father     Heart attack Sister     Heart disease Sister     Hypertension Sister     Heart attack Brother     Heart disease Brother     Hypertension Brother     Heart attack Paternal Aunt     Heart attack Paternal Uncle        Past Surgical History:   Procedure Laterality Date    ANTERIOR CERVICAL DISCECTOMY W/ FUSION Bilateral 10/27/2022    Procedure: REOPERATION ANTERIOR CERVICAL DISCECTOMY AND FUSION C3-4 AND C4-5  WITH INTERBODY SPACER, AUTOGRAFT AND ANTERIOR INSTRUMENTATION AND REMOVAL OF ANTERIOR INSTRUMENTATION C5-6;  Surgeon: Juan Manuel Rushing MD;  Location: [facility] MAIN OR;  Service: Neurosurgery;  Laterality: Bilateral;    CARDIAC CATHETERIZATION      COLONOSCOPY N/A 11/05/2021    Procedure: COLONOSCOPY WITH POLYPECTOMY X 5;  Surgeon: Ronaldo Forrester MD;  Location: [facility] ENDOSCOPY;  Service: Gastroenterology;  Laterality: N/A;  POLYP, DIVERTICULOSIS    CYSTOSCOPY URETEROSCOPY Left 12/10/2018    Dr. Ty    HAND SURGERY Left     Ganglion cyst removed palm of left hand    HAND SURGERY  08/21/2019    REPLACEMENT TOTAL KNEE Bilateral     one in 2001 and the other in 2002    SEPTOPLASTY      deviated septum    VEIN SURGERY         Past Medical History:   Diagnosis Date    Allergic     Atrial fibrillation 6/14/2018    3/18/2024    CAD (coronary artery disease) 1997    CKD (chronic kidney disease), stage II(HCC) 06/2018    Dr. West    Diabetes mellitus     Diverticulosis 10/2016    DJD (degenerative joint disease)     DVT, lower extremity, recurrent, left 02/2017    Elevated factor VIII level 2018    Factor 5 Leiden mutation, heterozygous     Hyperlipidemia 1997    Hypertension 1997    Pneumonia 2018 January 2018 and June 2018    Renal calculi 09/2016    Sleep apnea 2018    Dr. Grier    Urinary tract infection        Family History   Problem Relation Age of Onset    Lymphoma Brother 42        Non Hodgkins lymphoma    Stroke Brother     Heart attack Other         Extensive MI History on Paternal side    Clotting disorder Sister     Heart attack Father     Heart failure Father     Heart attack Sister     Heart disease Sister     Hypertension Sister     Heart attack Brother     Heart disease Brother     Hypertension Brother     Heart attack Paternal Aunt     Heart attack Paternal Uncle        Social History     Socioeconomic History    Marital status:    Tobacco Use    Smoking status: Former     Current  "packs/day: 0.00     Average packs/day: 1.5 packs/day for 2.6 years (3.9 ttl pk-yrs)     Types: Cigarettes     Start date: 1978     Quit date: 1980     Years since quittin.2     Passive exposure: Past    Smokeless tobacco: Never    Tobacco comments:     smoked one and a half packs of cigarettes a day for two years quitting at age 36   Vaping Use    Vaping status: Never Used   Substance and Sexual Activity    Alcohol use: Not Currently     Comment: drinks two beers monthly    Drug use: No    Sexual activity: Yes     Partners: Female     Birth control/protection: None         Procedures      Objective:       Physical Exam    /83 (BP Location: Right arm, Patient Position: Sitting, Cuff Size: Adult)   Pulse 64   Ht 180.3 cm (71\")   Wt 136 kg (300 lb)   SpO2 94%   BMI 41.84 kg/m²   The patient is alert, oriented and in no distress.    Vital signs as noted above.  Exogenous obesity (BMI 42)    Head and neck revealed no carotid bruits or jugular venous distension.  No thyromegaly or lymphadenopathy is present.    Lungs clear.  No wheezing.  Breath sounds are normal bilaterally.    Heart normal first and second heart sounds.  No murmur..  No pericardial rub is present.  No gallop is present.    Abdomen soft and nontender.  No organomegaly is present.    Extremities revealed good peripheral pulses without any pedal edema.    Skin warm and dry.    Musculoskeletal system is grossly normal.    CNS grossly normal.    Reviewed and updated.        "

## 2024-03-30 ENCOUNTER — APPOINTMENT (OUTPATIENT)
Dept: GENERAL RADIOLOGY | Facility: HOSPITAL | Age: 69
End: 2024-03-30
Payer: MEDICARE

## 2024-03-30 ENCOUNTER — HOSPITAL ENCOUNTER (OUTPATIENT)
Facility: HOSPITAL | Age: 69
Discharge: HOME | End: 2024-03-30
Attending: EMERGENCY MEDICINE
Payer: MEDICARE

## 2024-03-30 VITALS
WEIGHT: 304 LBS | RESPIRATION RATE: 16 BRPM | SYSTOLIC BLOOD PRESSURE: 138 MMHG | DIASTOLIC BLOOD PRESSURE: 72 MMHG | HEIGHT: 71 IN | OXYGEN SATURATION: 96 % | TEMPERATURE: 98.1 F | HEART RATE: 59 BPM | BODY MASS INDEX: 42.56 KG/M2

## 2024-03-30 DIAGNOSIS — R05.1 ACUTE COUGH: Primary | ICD-10-CM

## 2024-03-30 DIAGNOSIS — R09.82 POSTNASAL DRIP: ICD-10-CM

## 2024-03-30 LAB
FLUAV SUBTYP SPEC NAA+PROBE: NOT DETECTED
FLUBV RNA ISLT QL NAA+PROBE: NOT DETECTED
SARS-COV-2 RNA RESP QL NAA+PROBE: NOT DETECTED

## 2024-03-30 PROCEDURE — 87636 SARSCOV2 & INF A&B AMP PRB: CPT | Performed by: EMERGENCY MEDICINE

## 2024-03-30 PROCEDURE — 99213 OFFICE O/P EST LOW 20 MIN: CPT | Performed by: PHYSICIAN ASSISTANT

## 2024-03-30 PROCEDURE — 71046 X-RAY EXAM CHEST 2 VIEWS: CPT

## 2024-03-30 PROCEDURE — G0463 HOSPITAL OUTPT CLINIC VISIT: HCPCS | Performed by: PHYSICIAN ASSISTANT

## 2024-03-30 RX ORDER — METHYLPREDNISOLONE 4 MG/1
TABLET ORAL
Qty: 21 TABLET | Refills: 0 | Status: SHIPPED | OUTPATIENT
Start: 2024-03-30

## 2024-03-30 RX ORDER — LEVOCETIRIZINE DIHYDROCHLORIDE 5 MG/1
2.5 TABLET, FILM COATED ORAL EVERY EVENING
Qty: 30 TABLET | Refills: 0 | Status: SHIPPED | OUTPATIENT
Start: 2024-03-30 | End: 2024-05-29

## 2024-03-30 NOTE — ED NOTES
Pt reports productive cough and nasal congestion x 3 weeks. Pt reports cough has not resolved and has a hx of Pneumonia. Pt reports infrequent headaches. Pt denies any chest pain shortness of breath.

## 2024-03-30 NOTE — FSED PROVIDER NOTE
Subjective   History of Present Illness  Patient presents to the clinic today complaining of nasal congestion, nasal drainage and cough.  Patient's symptoms have been off and on for the past 3 weeks.  Patient denies any chest pain, shortness of breath, abdominal pain, nausea, vomiting or fever.      Review of Systems   Constitutional:  Negative for chills and fever.   HENT:  Positive for postnasal drip and rhinorrhea. Negative for sinus pressure, sinus pain and sore throat.    Respiratory:  Positive for cough. Negative for shortness of breath.    Cardiovascular:  Negative for chest pain.   Gastrointestinal:  Negative for abdominal pain, constipation, diarrhea, nausea and vomiting.   Musculoskeletal:  Negative for arthralgias, back pain, myalgias and neck pain.   Skin:  Negative for rash and wound.   Neurological:  Negative for dizziness, weakness, light-headedness and headaches.   All other systems reviewed and are negative.      Past Medical History:   Diagnosis Date    Allergic     Atrial fibrillation 6/14/2018    3/18/2024    CAD (coronary artery disease) 1997    CKD (chronic kidney disease), stage II(HCC) 06/2018    Dr. West    Diabetes mellitus     Diverticulosis 10/2016    DJD (degenerative joint disease)     DVT, lower extremity, recurrent, left 02/2017    Elevated factor VIII level 2018    Factor 5 Leiden mutation, heterozygous     Hyperlipidemia 1997    Hypertension 1997    Pneumonia 2018 January 2018 and June 2018    Renal calculi 09/2016    Sleep apnea 2018    Dr. Grier    Urinary tract infection        Allergies   Allergen Reactions    Bee Venom Anaphylaxis    Gemfibrozil Hives     Lopid        Past Surgical History:   Procedure Laterality Date    ANTERIOR CERVICAL DISCECTOMY W/ FUSION Bilateral 10/27/2022    Procedure: REOPERATION ANTERIOR CERVICAL DISCECTOMY AND FUSION C3-4 AND C4-5 WITH INTERBODY SPACER, AUTOGRAFT AND ANTERIOR INSTRUMENTATION AND REMOVAL OF ANTERIOR INSTRUMENTATION C5-6;   Surgeon: Juan Manuel Rushing MD;  Location: [facility] MAIN OR;  Service: Neurosurgery;  Laterality: Bilateral;    CARDIAC CATHETERIZATION      COLONOSCOPY N/A 2021    Procedure: COLONOSCOPY WITH POLYPECTOMY X 5;  Surgeon: Ronaldo Forrester MD;  Location: [facility] ENDOSCOPY;  Service: Gastroenterology;  Laterality: N/A;  POLYP, DIVERTICULOSIS    CYSTOSCOPY URETEROSCOPY Left 12/10/2018    Dr. Ty    HAND SURGERY Left     Ganglion cyst removed palm of left hand    HAND SURGERY  2019    REPLACEMENT TOTAL KNEE Bilateral     one in  and the other in     SEPTOPLASTY      deviated septum    VEIN SURGERY         Family History   Problem Relation Age of Onset    Lymphoma Brother 42        Non Hodgkins lymphoma    Stroke Brother     Heart attack Other         Extensive MI History on Paternal side    Clotting disorder Sister     Heart attack Father     Heart failure Father     Heart attack Sister     Heart disease Sister     Hypertension Sister     Heart attack Brother     Heart disease Brother     Hypertension Brother     Heart attack Paternal Aunt     Heart attack Paternal Uncle        Social History     Socioeconomic History    Marital status:    Tobacco Use    Smoking status: Former     Current packs/day: 0.00     Average packs/day: 1.5 packs/day for 2.6 years (3.9 ttl pk-yrs)     Types: Cigarettes     Start date: 1978     Quit date: 1980     Years since quittin.2     Passive exposure: Past    Smokeless tobacco: Never    Tobacco comments:     smoked one and a half packs of cigarettes a day for two years quitting at age 36   Vaping Use    Vaping status: Never Used   Substance and Sexual Activity    Alcohol use: Not Currently     Comment: drinks two beers monthly    Drug use: No    Sexual activity: Yes     Partners: Female     Birth control/protection: None           Objective   Physical Exam  Vitals reviewed.   Constitutional:       General: He is not in acute distress.      Appearance: Normal appearance. He is obese. He is not ill-appearing or toxic-appearing.   HENT:      Head: Normocephalic and atraumatic.      Nose: Congestion and rhinorrhea present.   Eyes:      General: No scleral icterus.        Right eye: No discharge.         Left eye: No discharge.      Extraocular Movements: Extraocular movements intact.      Conjunctiva/sclera: Conjunctivae normal.      Pupils: Pupils are equal, round, and reactive to light.   Cardiovascular:      Rate and Rhythm: Normal rate and regular rhythm.      Pulses: Normal pulses.      Heart sounds: Normal heart sounds. No murmur heard.     No friction rub. No gallop.   Pulmonary:      Effort: Pulmonary effort is normal. No respiratory distress.      Breath sounds: Normal breath sounds. No wheezing.   Musculoskeletal:         General: No swelling, tenderness or signs of injury. Normal range of motion.      Cervical back: Normal range of motion. No tenderness.   Skin:     General: Skin is warm and dry.      Coloration: Skin is not pale.      Findings: No erythema or rash.   Neurological:      General: No focal deficit present.      Mental Status: He is alert and oriented to person, place, and time.      Cranial Nerves: No cranial nerve deficit.   Psychiatric:         Mood and Affect: Mood normal.         Behavior: Behavior normal.         Thought Content: Thought content normal.         Judgment: Judgment normal.         Procedures           ED Course                                           Medical Decision Making  Patient was negative for COVID-19 and influenza and these results were gone over with the patient in the clinic today.  Patient's chest x-ray is negative for any acute cardiopulmonary these processes.  Patient's symptoms are likely viral versus allergy in nature and the cough secondary to postnasal drainage.  I am not concerned for sinusitis, strep throat or pneumonia.  Patient will be discharged home with a prescription for Medrol  Dosepak and Xyzal.  Patient to follow-up with PCP.  Patient return to clinic if symptoms persist or worsen.    Problems Addressed:  Acute cough: complicated acute illness or injury  Postnasal drip: complicated acute illness or injury    Amount and/or Complexity of Data Reviewed  Radiology: ordered.    Risk  Prescription drug management.        Final diagnoses:   Acute cough   Postnasal drip       ED Disposition  ED Disposition       ED Disposition   Discharge    Condition   Stable    Comment   --               Myra Tello MD  [address] [place] [address] [phone]    Go to   As needed         Medication List        New Prescriptions      levocetirizine 5 MG tablet  Commonly known as: XYZAL  Take 0.5 tablets by mouth Every Evening for 60 days.     methylPREDNISolone 4 MG dose pack  Commonly known as: MEDROL  Take as directed on package instructions.               Where to Get Your Medications        These medications were sent to [facility] [id number] - [place], [place] - [address] - [phone]  - [phone] FX  [address] [facility] [place] [address]      Phone: [phone]   levocetirizine 5 MG tablet  methylPREDNISolone 4 MG dose pack

## 2024-04-10 ENCOUNTER — TELEPHONE (OUTPATIENT)
Dept: CARDIOLOGY | Facility: CLINIC | Age: 69
End: 2024-04-10
Payer: MEDICARE

## 2024-04-10 RX ORDER — METOPROLOL SUCCINATE 25 MG/1
25 TABLET, EXTENDED RELEASE ORAL DAILY
Qty: 90 TABLET | Refills: 3 | Status: SHIPPED | OUTPATIENT
Start: 2024-04-10

## 2024-04-10 NOTE — TELEPHONE ENCOUNTER
Rx Refill Note  Requested Prescriptions     Pending Prescriptions Disp Refills    metoprolol succinate XL (TOPROL-XL) 25 MG 24 hr tablet 90 tablet 3     Sig: Take 1 tablet by mouth Daily.      Last office visit with prescribing clinician: 3/20/2024   Last telemedicine visit with prescribing clinician: Visit date not found   Next office visit with prescribing clinician: 10/1/2024                         Would you like a call back once the refill request has been completed: [] Yes [] No    If the office needs to give you a call back, can they leave a voicemail: [] Yes [] No    Megan Barrera MA  04/10/24, 12:12 EDT

## 2024-04-10 NOTE — TELEPHONE ENCOUNTER
Incoming Refill Request      Medication requested (name and dose): metoprolol 25 mg    [facility] where [facility]    Additional details provided by patient: #[phone]    Best call back number: [phone]    Does the patient have less than a 3 day supply:  [x] Yes  [] No    Reshma Sanchez Rep  04/10/24, 12:07 EDT

## 2024-06-26 ENCOUNTER — HOSPITAL ENCOUNTER (EMERGENCY)
Facility: HOSPITAL | Age: 69
Discharge: HOME | End: 2024-06-26
Attending: EMERGENCY MEDICINE
Payer: MEDICARE

## 2024-06-26 ENCOUNTER — APPOINTMENT (OUTPATIENT)
Dept: GENERAL RADIOLOGY | Facility: HOSPITAL | Age: 69
End: 2024-06-26
Payer: MEDICARE

## 2024-06-26 ENCOUNTER — APPOINTMENT (OUTPATIENT)
Dept: RESPIRATORY THERAPY | Facility: HOSPITAL | Age: 69
End: 2024-06-26
Payer: MEDICARE

## 2024-06-26 VITALS
BODY MASS INDEX: 41.02 KG/M2 | TEMPERATURE: 97.8 F | SYSTOLIC BLOOD PRESSURE: 131 MMHG | DIASTOLIC BLOOD PRESSURE: 72 MMHG | HEART RATE: 59 BPM | RESPIRATION RATE: 16 BRPM | HEIGHT: 71 IN | OXYGEN SATURATION: 97 % | WEIGHT: 293 LBS

## 2024-06-26 DIAGNOSIS — I48.0 PAROXYSMAL ATRIAL FIBRILLATION: ICD-10-CM

## 2024-06-26 DIAGNOSIS — R00.2 PALPITATIONS: Primary | ICD-10-CM

## 2024-06-26 LAB
ALBUMIN SERPL-MCNC: 4.1 G/DL (ref 3.5–5.2)
ALBUMIN/GLOB SERPL: 1.4 G/DL
ALP SERPL-CCNC: 103 U/L (ref 39–117)
ALT SERPL W P-5'-P-CCNC: 46 U/L (ref 1–41)
ANION GAP SERPL CALCULATED.3IONS-SCNC: 12.7 MMOL/L (ref 5–15)
AST SERPL-CCNC: 42 U/L (ref 1–40)
BASOPHILS # BLD AUTO: 0.05 10*3/MM3 (ref 0–0.2)
BASOPHILS NFR BLD AUTO: 0.7 % (ref 0–1.5)
BILIRUB SERPL-MCNC: 0.3 MG/DL (ref 0–1.2)
BUN SERPL-MCNC: 19 MG/DL (ref 8–23)
BUN/CREAT SERPL: 14 (ref 7–25)
CALCIUM SPEC-SCNC: 9.2 MG/DL (ref 8.6–10.5)
CHLORIDE SERPL-SCNC: 103 MMOL/L (ref 98–107)
CO2 SERPL-SCNC: 21.3 MMOL/L (ref 22–29)
CREAT SERPL-MCNC: 1.36 MG/DL (ref 0.76–1.27)
DEPRECATED RDW RBC AUTO: 42.3 FL (ref 37–54)
EGFRCR SERPLBLD CKD-EPI 2021: 56.7 ML/MIN/1.73
EOSINOPHIL # BLD AUTO: 0.13 10*3/MM3 (ref 0–0.4)
EOSINOPHIL NFR BLD AUTO: 1.9 % (ref 0.3–6.2)
ERYTHROCYTE [DISTWIDTH] IN BLOOD BY AUTOMATED COUNT: 14.3 % (ref 12.3–15.4)
GLOBULIN UR ELPH-MCNC: 2.9 GM/DL
GLUCOSE SERPL-MCNC: 265 MG/DL (ref 65–99)
HCT VFR BLD AUTO: 54.8 % (ref 37.5–51)
HGB BLD-MCNC: 17.5 G/DL (ref 13–17.7)
HOLD SPECIMEN: NORMAL
HOLD SPECIMEN: NORMAL
IMM GRANULOCYTES # BLD AUTO: 0.01 10*3/MM3 (ref 0–0.05)
IMM GRANULOCYTES NFR BLD AUTO: 0.1 % (ref 0–0.5)
LYMPHOCYTES # BLD AUTO: 1.69 10*3/MM3 (ref 0.7–3.1)
LYMPHOCYTES NFR BLD AUTO: 24.9 % (ref 19.6–45.3)
MCH RBC QN AUTO: 27 PG (ref 26.6–33)
MCHC RBC AUTO-ENTMCNC: 31.9 G/DL (ref 31.5–35.7)
MCV RBC AUTO: 84.4 FL (ref 79–97)
MONOCYTES # BLD AUTO: 0.65 10*3/MM3 (ref 0.1–0.9)
MONOCYTES NFR BLD AUTO: 9.6 % (ref 5–12)
NEUTROPHILS NFR BLD AUTO: 4.26 10*3/MM3 (ref 1.7–7)
NEUTROPHILS NFR BLD AUTO: 62.8 % (ref 42.7–76)
NRBC BLD AUTO-RTO: 0 /100 WBC (ref 0–0.2)
NT-PROBNP SERPL-MCNC: 223 PG/ML (ref 0–900)
PLATELET # BLD AUTO: 207 10*3/MM3 (ref 140–450)
PMV BLD AUTO: 10.9 FL (ref 6–12)
POTASSIUM SERPL-SCNC: 4.2 MMOL/L (ref 3.5–5.2)
PROT SERPL-MCNC: 7 G/DL (ref 6–8.5)
RBC # BLD AUTO: 6.49 10*6/MM3 (ref 4.14–5.8)
SODIUM SERPL-SCNC: 137 MMOL/L (ref 136–145)
TROPONIN T SERPL HS-MCNC: 20 NG/L
WBC NRBC COR # BLD AUTO: 6.79 10*3/MM3 (ref 3.4–10.8)
WHOLE BLOOD HOLD COAG: NORMAL
WHOLE BLOOD HOLD SPECIMEN: NORMAL

## 2024-06-26 PROCEDURE — 84484 ASSAY OF TROPONIN QUANT: CPT | Performed by: EMERGENCY MEDICINE

## 2024-06-26 PROCEDURE — 80053 COMPREHEN METABOLIC PANEL: CPT | Performed by: EMERGENCY MEDICINE

## 2024-06-26 PROCEDURE — 93005 ELECTROCARDIOGRAM TRACING: CPT | Performed by: EMERGENCY MEDICINE

## 2024-06-26 PROCEDURE — 71045 X-RAY EXAM CHEST 1 VIEW: CPT

## 2024-06-26 PROCEDURE — 83880 ASSAY OF NATRIURETIC PEPTIDE: CPT | Performed by: EMERGENCY MEDICINE

## 2024-06-26 PROCEDURE — 85025 COMPLETE CBC W/AUTO DIFF WBC: CPT | Performed by: EMERGENCY MEDICINE

## 2024-06-26 PROCEDURE — 93005 ELECTROCARDIOGRAM TRACING: CPT

## 2024-06-26 PROCEDURE — 99284 EMERGENCY DEPT VISIT MOD MDM: CPT

## 2024-06-26 NOTE — ED PROVIDER NOTES
Subjective   History of Present Illness  69-year-old male complaining of variable heart rate today some of this variation is related to the patient standing up and starting to move.  He states that he thought he had some irregular episodes consistent with previous atrial fibrillation.  The patient states that he had no associated chest pain or shortness of breath.  He reports no diaphoresis or palpitations.  He reports no recent fever chills or use of prednisone.  The patient reports no recent leg pain or swelling and denies any recent change in exercise tolerance      Review of Systems   Constitutional:  Negative for diaphoresis.   Respiratory:  Negative for shortness of breath.    Cardiovascular:  Positive for palpitations. Negative for chest pain.   Hematological:  Does not bruise/bleed easily.   All other systems reviewed and are negative.      Past Medical History:   Diagnosis Date    Allergic     Atrial fibrillation 6/14/2018    3/18/2024    CAD (coronary artery disease) 1997    CKD (chronic kidney disease), stage II(HCC) 06/2018    Dr. West    Diabetes mellitus     Diverticulosis 10/2016    DJD (degenerative joint disease)     DVT, lower extremity, recurrent, left 02/2017    Elevated factor VIII level 2018    Factor 5 Leiden mutation, heterozygous     Hyperlipidemia 1997    Hypertension 1997    Pneumonia 2018 January 2018 and June 2018    Renal calculi 09/2016    Sleep apnea 2018    Dr. Grier    Urinary tract infection        Allergies   Allergen Reactions    Bee Venom Anaphylaxis    Gemfibrozil Hives     Lopid        Past Surgical History:   Procedure Laterality Date    ANTERIOR CERVICAL DISCECTOMY W/ FUSION Bilateral 10/27/2022    Procedure: REOPERATION ANTERIOR CERVICAL DISCECTOMY AND FUSION C3-4 AND C4-5 WITH INTERBODY SPACER, AUTOGRAFT AND ANTERIOR INSTRUMENTATION AND REMOVAL OF ANTERIOR INSTRUMENTATION C5-6;  Surgeon: Juan Manuel Rushing MD;  Location: [facility];  Service: Neurosurgery;   Laterality: Bilateral;    CARDIAC CATHETERIZATION      COLONOSCOPY N/A 2021    Procedure: COLONOSCOPY WITH POLYPECTOMY X 5;  Surgeon: Ronaldo Forrester MD;  Location: [facility] ENDOSCOPY;  Service: Gastroenterology;  Laterality: N/A;  POLYP, DIVERTICULOSIS    CYSTOSCOPY URETEROSCOPY Left 12/10/2018    Dr. Ty    HAND SURGERY Left     Ganglion cyst removed palm of left hand    HAND SURGERY  2019    REPLACEMENT TOTAL KNEE Bilateral     one in  and the other in     SEPTOPLASTY      deviated septum    VEIN SURGERY         Family History   Problem Relation Age of Onset    Lymphoma Brother 42        Non Hodgkins lymphoma    Stroke Brother     Heart attack Other         Extensive MI History on Paternal side    Clotting disorder Sister     Heart attack Father     Heart failure Father     Heart attack Sister     Heart disease Sister     Hypertension Sister     Heart attack Brother     Heart disease Brother     Hypertension Brother     Heart attack Paternal Aunt     Heart attack Paternal Uncle        Social History     Socioeconomic History    Marital status:    Tobacco Use    Smoking status: Former     Current packs/day: 0.00     Average packs/day: 1.5 packs/day for 2.6 years (3.9 ttl pk-yrs)     Types: Cigarettes     Start date: 1978     Quit date: 1980     Years since quittin.5     Passive exposure: Past    Smokeless tobacco: Never    Tobacco comments:     smoked one and a half packs of cigarettes a day for two years quitting at age 36   Vaping Use    Vaping status: Never Used   Substance and Sexual Activity    Alcohol use: Not Currently     Comment: drinks two beers monthly    Drug use: No    Sexual activity: Yes     Partners: Female     Birth control/protection: None     States he is active and walks in his pool      Objective   Physical Exam  Alert [place] Coma Scale 15   HEENT: Pupils equal and reactive to light. Conjunctivae are not injected. Normal tympanic membranes.  Oropharynx and nares are normal.   Neck: Supple. Midline trachea. No JVD. No goiter.   Chest: Clear and equal breath sounds bilaterally, regular rate and rhythm without murmur or rub.  Sinus rhythm.  No ventricular ectopy.  No atrial tachycardia   Abdomen: Positive bowel sounds, nontender, nondistended. No rebound or peritoneal signs. No CVA tenderness.   Extremities no clubbing. cyanosis or edema. Motor sensory exam is normal. The full range of motion is intact   Skin: Warm and dry, no rashes or petechia.   Lymphatic: No regional lymphadenopathy. No calf pain, swelling or Homans sign    Procedures           ED Course                            Labs Reviewed   COMPREHENSIVE METABOLIC PANEL - Abnormal; Notable for the following components:       Result Value    Glucose 265 (*)     Creatinine 1.36 (*)     CO2 21.3 (*)     ALT (SGPT) 46 (*)     AST (SGOT) 42 (*)     eGFR 56.7 (*)     All other components within normal limits    Narrative:     GFR Normal >60  Chronic Kidney Disease <60  Kidney Failure <15     CBC WITH AUTO DIFFERENTIAL - Abnormal; Notable for the following components:    RBC 6.49 (*)     Hematocrit 54.8 (*)     All other components within normal limits   SINGLE HS TROPONIN T - Normal    Narrative:     High Sensitive Troponin T Reference Range:  <14.0 ng/L- Negative Female for AMI  <22.0 ng/L- Negative Male for AMI  >=14 - Abnormal Female indicating possible myocardial injury.  >=22 - Abnormal Male indicating possible myocardial injury.   Clinicians would have to utilize clinical acumen, EKG, Troponin, and serial changes to determine if it is an Acute Myocardial Infarction or myocardial injury due to an underlying chronic condition.        BNP (IN-HOUSE) - Normal    Narrative:     This assay is used as an aid in the diagnosis of individuals suspected of having heart failure. It can be used as an aid in the diagnosis of acute decompensated heart failure (ADHF) in patients presenting with signs and  symptoms of ADHF to the emergency department (ED). In addition, NT-proBNP of <300 pg/mL indicates ADHF is not likely.    Age Range Result Interpretation  NT-proBNP Concentration (pg/mL:      <50             Positive            >450                   Gray                 300-450                    Negative             <300    50-75           Positive            >900                  Gray                300-900                  Negative            <300      >75             Positive            >1800                  Gray                300-1800                  Negative            <300   RAINBOW DRAW    Narrative:     The following orders were created for panel order [place] Draw.  Procedure                               Abnormality         Status                     ---------                               -----------         ------                     Green Top (Gel)[[id number]]                                  Final result               Lavender Top[[id number]]                                     Final result               Gold Top - SST[[id number]]                                   Final result               Light Blue Top[[id number]]                                   Final result                 Please view results for these tests on the individual orders.   GREEN TOP   LAVENDER TOP   GOLD TOP - SST   LIGHT BLUE TOP   CBC AND DIFFERENTIAL    Narrative:     The following orders were created for panel order CBC & Differential.  Procedure                               Abnormality         Status                     ---------                               -----------         ------                     CBC Auto Differential[[id number]]        Abnormal            Final result                 Please view results for these tests on the individual orders.     Medications - No data to display  XR Chest 1 View    Result Date: 6/26/2024  Impression: No acute chest finding. Electronically Signed: Kym Wiggins MD  6/26/2024 12:48 PM  EDT  Workstation ID: [id number]                      Medical Decision Making  Stable ER course.  The patient be fitted with M cot monitoring and will follow-up with cardiology.  The patient's resting rate was relatively bradycardic and beta-blocker dose was not increased at this time    Amount and/or Complexity of Data Reviewed  Independent Historian: spouse  Labs: ordered. Decision-making details documented in ED Course.  Radiology: ordered and independent interpretation performed.  ECG/medicine tests: ordered and independent interpretation performed.     Details: Sinus rhythm with left atrial enlargement no acute ischemic or injury pattern previous EKG shows sinus rhythm in March of this year        Final diagnoses:   Palpitations   Paroxysmal atrial fibrillation       ED Disposition  ED Disposition       ED Disposition   Discharge    Condition   Stable    Comment   --               Myra Tello MD  [address] [place] [facility]  [phone]               Medication List      No changes were made to your prescriptions during this visit.            Fred Bauer MD  06/26/24 1760

## 2024-06-27 LAB
QT INTERVAL: 380 MS
QTC INTERVAL: 430 MS

## 2024-09-11 NOTE — PROGRESS NOTES
Encounter Date:10/01/2024  Last seen 3/20/2024      Patient ID: Seth Torres is a 69 y.o. male.      Chief Complaint:  Atrial fibrillation  Coronary artery disease  Anticoagulation management  Renal dysfunction  Hypertension     History of Present Illness  Since I have last seen, the patient has been without any chest discomfort ,shortness of breath, palpitations, dizziness or syncope.  Denies having any headache ,abdominal pain ,nausea, vomiting , diarrhea constipation, loss of weight or loss of appetite.  Denies having any excessive bruising ,hematuria or blood in the stool.    Review of all systems negative except as indicated.    Reviewed ROS.  Assessment and Plan      ]]]]]]]]]]]]]]]]]]]]  History  ==========   -history of atrial fibrillation  converted and maintaining sinus rhythm.  Sinus rhythm-8/29/2023  Atrial fibrillation 3/18/2024-converted to sinus rhythm.    3/18/2024 patient woke up with palpitations and his heart rate was 160.  Patient went to the emergency room and was noted to have atrial fibrillation with RVR.  Patient was given intravenous Cardizem and has converted to sinus rhythm.  Patient was started on metoprolol.     Echocardiogram showed normal left ventricular function without any pericardial effusion.  June 2018.      Patient had pneumonia and febrile illness associated with atrial atrial fibrillation     -History of pneumonia-left lower lobe seen on chest x-ray as well of conformed by CT scan.      -Chronic coronary artery disease-stable angina pectoris   mild coronary artery disease.  Cardiac catheterization 2012 revealed 50-60% lad disease     -anticoagulation -on Eliquis as long-term treatment for DVT      -Renal dysfunction bun35 cr 2.4  Hypertension dyslipidemia diabetes     - history of DVT Status post thrombectomy.  Status post IVC filter placement     -Exogenous obesity     -allergic to bee venom lopid  =======  Plan  ==========  History of atrial fibrillation  Patient has  converted and maintaining sinus rhythm.  EKG showed sinus rhythm without any ischemic changes.-  8/11/2022  EKG 10/3/2022 showed sinus rhythm (reviewed independently and interpreted.)  EKG 8/29/2023-sinus rhythm  EKG 3/7/2024-sinus rhythm  EKG 3/18/2024-atrial fibrillation-converted to sinus rhythm.  EKG was not performed 10/1/2024.     Chronic coronary artery disease  Patient is not having any angina pectoris or congestive heart failure.     Anticoagulation-on Eliquis.  Patient is taking Eliquis 2.5 mg twice daily (patient apparently has been taking all along.)  Prescribed by hematology/oncologist.  Observe for toxic effects.     CKD 3-Dr. West follow-up.     Hypertension-125/76  Well-controlled.     Dyslipidemia-continue pravastatin     Diabetes-patient is on Farxiga.     Medications were reviewed and updated.  patient is off amiodarone  Continue Cardizem and Eliquis  Continue Eliquis as long-term therapy for DVT etc.  Patient is on metoprolol.     Followup in the office in 6 months.     Further plan will depend on patient's progress.     Reviewed and updated 10/1/2024.  [[[[[[[[[[[[[[[[[[                 Diagnosis Plan   1. Paroxysmal atrial fibrillation        2. Stage 3 chronic kidney disease, unspecified whether stage 3a or 3b CKD        3. Chronic anticoagulation        4. Long term (current) use of anticoagulants        5. Recurrent deep vein thrombosis (DVT) of left lower extremity        LAB RESULTS (LAST 7 DAYS)    CBC        BMP        CMP         BNP        TROPONIN        CoAg        Creatinine Clearance  CrCl cannot be calculated (Patient's most recent lab result is older than the maximum 30 days allowed.).    ABG        Radiology  No radiology results for the last day                The following portions of the patient's history were reviewed and updated as appropriate: allergies, current medications, past family history, past medical history, past social history, past surgical history, and  problem list.    Review of Systems   Constitutional: Negative for malaise/fatigue.   Cardiovascular:  Negative for chest pain, dyspnea on exertion, leg swelling and palpitations.   Respiratory:  Negative for cough and shortness of breath.    Gastrointestinal:  Negative for abdominal pain, nausea and vomiting.   Neurological:  Negative for dizziness, focal weakness, headaches, light-headedness and numbness.   All other systems reviewed and are negative.      Current Outpatient Medications:     acetaminophen (TYLENOL) 500 MG tablet, Take 1 tablet by mouth Every 6 (Six) Hours As Needed for Mild Pain., Disp: , Rfl:     allopurinol (ZYLOPRIM) 100 MG tablet, Take 1 tablet by mouth Daily., Disp: , Rfl:     apixaban (ELIQUIS) 2.5 MG tablet tablet, Take 1 tablet by mouth 2 (Two) Times a Day., Disp: 70 tablet, Rfl: 0    cholecalciferol (VITAMIN D3) 25 MCG (1000 UT) tablet, Take 1 tablet by mouth Daily., Disp: , Rfl:     cyclobenzaprine (FLEXERIL) 10 MG tablet, Take 1 tablet by mouth 3 (Three) Times a Day As Needed for Muscle Spasms., Disp: 30 tablet, Rfl: 0    dapagliflozin (Farxiga) 5 MG tablet tablet, Take 2 tablets by mouth Daily., Disp: , Rfl:     dilTIAZem CD (CARDIZEM CD) 240 MG 24 hr capsule, 1 capsule Daily., Disp: , Rfl:     furosemide (LASIX) 40 MG tablet, Take 1 tablet by mouth Daily., Disp: , Rfl:     hydrALAZINE (APRESOLINE) 25 MG tablet, Take 1 tablet by mouth 2 (Two) Times a Day., Disp: , Rfl:     isosorbide mononitrate (IMDUR) 30 MG 24 hr tablet, Take 1 tablet by mouth Daily., Disp: , Rfl:     levocetirizine (XYZAL) 5 MG tablet, Take 0.5 tablets by mouth Every Evening for 60 days., Disp: 30 tablet, Rfl: 0    Magnesium Oxide -Mg Supplement 400 MG capsule, 1 capsule Daily., Disp: , Rfl:     metFORMIN (FORTAMET) 500 MG (OSM) 24 hr tablet, Take 1 tablet by mouth Daily With Breakfast., Disp: , Rfl:     methylPREDNISolone (MEDROL) 4 MG dose pack, Take as directed on package instructions., Disp: 21 tablet, Rfl: 0     metoprolol succinate XL (TOPROL-XL) 25 MG 24 hr tablet, Take 1 tablet by mouth Daily., Disp: 90 tablet, Rfl: 3    omeprazole (priLOSEC) 40 MG capsule, Take 1 capsule by mouth Daily., Disp: , Rfl:     potassium chloride (KLOR-CON) 20 MEQ packet, Take 20 mEq by mouth Daily., Disp: , Rfl:     pravastatin (PRAVACHOL) 80 MG tablet, Take 1 tablet by mouth Every Night., Disp: , Rfl:     Allergies   Allergen Reactions    Bee Venom Anaphylaxis    Gemfibrozil Hives     Lopid        Family History   Problem Relation Age of Onset    Lymphoma Brother 42        Non Hodgkins lymphoma    Stroke Brother     Heart attack Other         Extensive MI History on Paternal side    Clotting disorder Sister     Heart attack Father     Heart failure Father     Heart attack Sister     Heart disease Sister     Hypertension Sister     Heart attack Brother     Heart disease Brother     Hypertension Brother     Heart attack Paternal Aunt     Heart attack Paternal Uncle        Past Surgical History:   Procedure Laterality Date    ANTERIOR CERVICAL DISCECTOMY W/ FUSION Bilateral 10/27/2022    Procedure: REOPERATION ANTERIOR CERVICAL DISCECTOMY AND FUSION C3-4 AND C4-5 WITH INTERBODY SPACER, AUTOGRAFT AND ANTERIOR INSTRUMENTATION AND REMOVAL OF ANTERIOR INSTRUMENTATION C5-6;  Surgeon: Juan Manuel Rushing MD;  Location: [facility] MAIN OR;  Service: Neurosurgery;  Laterality: Bilateral;    CARDIAC CATHETERIZATION      COLONOSCOPY N/A 11/05/2021    Procedure: COLONOSCOPY WITH POLYPECTOMY X 5;  Surgeon: Ronaldo Forrester MD;  Location: [facility] ENDOSCOPY;  Service: Gastroenterology;  Laterality: N/A;  POLYP, DIVERTICULOSIS    CYSTOSCOPY URETEROSCOPY Left 12/10/2018    Dr. Ty    HAND SURGERY Left     Ganglion cyst removed palm of left hand    HAND SURGERY  08/21/2019    REPLACEMENT TOTAL KNEE Bilateral     one in 2001 and the other in 2002    SEPTOPLASTY      deviated septum    VEIN SURGERY         Past Medical History:   Diagnosis Date    Allergic      Atrial fibrillation 2018    3/18/2024    CAD (coronary artery disease)     CKD (chronic kidney disease), stage II(HCC) 2018    Dr. West    Diabetes mellitus     Diverticulosis 10/2016    DJD (degenerative joint disease)     DVT, lower extremity, recurrent, left 2017    Elevated factor VIII level     Factor 5 Leiden mutation, heterozygous     Hyperlipidemia     Hypertension 1997    Pneumonia 2018 and 2018    Renal calculi 2016    Sleep apnea     Dr. Grier    Urinary tract infection        Family History   Problem Relation Age of Onset    Lymphoma Brother 42        Non Hodgkins lymphoma    Stroke Brother     Heart attack Other         Extensive MI History on Paternal side    Clotting disorder Sister     Heart attack Father     Heart failure Father     Heart attack Sister     Heart disease Sister     Hypertension Sister     Heart attack Brother     Heart disease Brother     Hypertension Brother     Heart attack Paternal Aunt     Heart attack Paternal Uncle        Social History     Socioeconomic History    Marital status:    Tobacco Use    Smoking status: Former     Current packs/day: 0.00     Average packs/day: 1.5 packs/day for 2.6 years (3.9 ttl pk-yrs)     Types: Cigarettes     Start date: 1978     Quit date: 1980     Years since quittin.7     Passive exposure: Past    Smokeless tobacco: Never    Tobacco comments:     smoked one and a half packs of cigarettes a day for two years quitting at age 36   Vaping Use    Vaping status: Never Used   Substance and Sexual Activity    Alcohol use: Not Currently     Comment: drinks two beers monthly    Drug use: No    Sexual activity: Yes     Partners: Female     Birth control/protection: None         Procedures      Objective:       Physical Exam    There were no vitals taken for this visit.  The patient is alert, oriented and in no distress.    Vital signs as noted above.  Exogenous obesity (BMI  42)    Head and neck revealed no carotid bruits or jugular venous distension.  No thyromegaly or lymphadenopathy is present.    Lungs clear.  No wheezing.  Breath sounds are normal bilaterally.    Heart normal first and second heart sounds.  No murmur..  No pericardial rub is present.  No gallop is present.    Abdomen soft and nontender.  No organomegaly is present.    Extremities revealed good peripheral pulses without any pedal edema.    Skin warm and dry.    Musculoskeletal system is grossly normal.    CNS grossly normal.    Reviewed and updated.

## 2024-10-01 ENCOUNTER — OFFICE VISIT (OUTPATIENT)
Dept: CARDIOLOGY | Facility: CLINIC | Age: 69
End: 2024-10-01
Payer: MEDICARE

## 2024-10-01 VITALS
HEIGHT: 71 IN | SYSTOLIC BLOOD PRESSURE: 125 MMHG | DIASTOLIC BLOOD PRESSURE: 76 MMHG | WEIGHT: 302 LBS | OXYGEN SATURATION: 96 % | BODY MASS INDEX: 42.28 KG/M2 | HEART RATE: 60 BPM

## 2024-10-01 DIAGNOSIS — N18.30 STAGE 3 CHRONIC KIDNEY DISEASE, UNSPECIFIED WHETHER STAGE 3A OR 3B CKD: ICD-10-CM

## 2024-10-01 DIAGNOSIS — Z79.01 CHRONIC ANTICOAGULATION: ICD-10-CM

## 2024-10-01 DIAGNOSIS — Z79.01 LONG TERM (CURRENT) USE OF ANTICOAGULANTS: ICD-10-CM

## 2024-10-01 DIAGNOSIS — I82.402 RECURRENT DEEP VEIN THROMBOSIS (DVT) OF LEFT LOWER EXTREMITY: ICD-10-CM

## 2024-10-01 DIAGNOSIS — I48.0 PAROXYSMAL ATRIAL FIBRILLATION: Primary | ICD-10-CM

## 2024-11-22 RX ORDER — METOPROLOL SUCCINATE 25 MG/1
25 TABLET, EXTENDED RELEASE ORAL DAILY
Qty: 90 TABLET | Refills: 3 | Status: SHIPPED | OUTPATIENT
Start: 2024-11-22

## 2024-11-22 NOTE — TELEPHONE ENCOUNTER
Rx Refill Note  Requested Prescriptions     Pending Prescriptions Disp Refills    metoprolol succinate XL (TOPROL-XL) 25 MG 24 hr tablet 90 tablet 3     Sig: Take 1 tablet by mouth Daily.      Last office visit with prescribing clinician: 10/1/2024   Last telemedicine visit with prescribing clinician: Visit date not found   Next office visit with prescribing clinician: 4/1/2025                         Would you like a call back once the refill request has been completed: [] Yes [] No    If the office needs to give you a call back, can they leave a voicemail: [] Yes [] No    Megan Barrera MA  11/22/24, 08:32 EST

## 2025-03-29 NOTE — PROGRESS NOTES
Encounter Date:04/01/2025    Last seen 10/1/2024      Patient ID: Seth Torres is a 69 y.o. male.      Chief Complaint:  Chronic coronary artery disease  Atrial fibrillation  Anticoagulation management  Hypertension  Renal dysfunction    History of present illness  Since I have last seen, the patient has been without any chest discomfort ,shortness of breath, palpitations, dizziness or syncope.  Denies having any headache ,abdominal pain ,nausea, vomiting , diarrhea constipation, loss of weight or loss of appetite.  Denies having any excessive bruising ,hematuria or blood in the stool.    Review of all systems negative except as indicated.    Reviewed ROS.       Assessment and Plan      ]]]]]]]]]]]]]]]]]]]]  History  ==========   -history of atrial fibrillation  converted and maintaining sinus rhythm.  Sinus rhythm-8/29/2023  Atrial fibrillation 3/18/2024-converted to sinus rhythm.     3/18/2024 patient woke up with palpitations and his heart rate was 160.  Patient went to the emergency room and was noted to have atrial fibrillation with RVR.  Patient was given intravenous Cardizem and has converted to sinus rhythm.  Patient was started on metoprolol.     Echocardiogram showed normal left ventricular function without any pericardial effusion.  June 2018.      Patient had pneumonia and febrile illness associated with atrial atrial fibrillation     -History of pneumonia-left lower lobe seen on chest x-ray as well of conformed by CT scan.      -Chronic coronary artery disease-stable angina pectoris   mild coronary artery disease.  Cardiac catheterization 2012 revealed 50-60% lad disease     -anticoagulation -on Eliquis as long-term treatment for DVT      -Renal dysfunction bun35 cr 2.4  Hypertension dyslipidemia diabetes     - history of DVT Status post thrombectomy.  Status post IVC filter placement     -Exogenous obesity     -allergic to bee venom lopid  =======  Plan  ==========  History of atrial  fibrillation  Patient has converted and maintaining sinus rhythm.  Normal send EKG showed sinus rhythm without any ischemic changes.-  8/11/2022  EKG 10/3/2022 showed sinus rhythm (reviewed independently and interpreted.)  EKG 8/29/2023-sinus rhythm  EKG 3/7/2024-sinus rhythm  EKG 3/18/2024-atrial fibrillation-converted to sinus rhythm.  EKG was not performed 10/1/2024.     Chronic coronary artery disease  Patient is not having any angina pectoris or congestive heart failure.    Anticoagulation status reviewed.  Continue Eliquis.  Anticoagulation-on Eliquis.  Patient is taking Eliquis 2.5 mg twice daily (patient apparently has been taking all along.)  Prescribed by hematology/oncologist.  Observe for toxic effects.     Hypertension well-controlled  138/85.    Dyslipidemia-continue pravastatin.     Diabetes-on Farxiga    CKD 3-Dr. West follow-up.    Medications were reviewed and updated.  patient is off amiodarone  Continue Cardizem and Eliquis  Continue Eliquis as long-term therapy for DVT etc.  Patient is on metoprolol.     Follow-up in the office in 6 months.    Further plan will depend on patient's progress.    Reviewed and updated 4/1/2025.  [[[[[[[[[[[[[[[[[[          Diagnosis Plan   1. Paroxysmal atrial fibrillation        2. Long term (current) use of anticoagulants        3. Stage 3 chronic kidney disease, unspecified whether stage 3a or 3b CKD        4. Recurrent deep vein thrombosis (DVT) of left lower extremity        5. Chronic anticoagulation        LAB RESULTS (LAST 7 DAYS)    CBC        BMP        CMP         BNP        TROPONIN        CoAg        Creatinine Clearance  CrCl cannot be calculated (Patient's most recent lab result is older than the maximum 30 days allowed.).    ABG        Radiology  No radiology results for the last day                The following portions of the patient's history were reviewed and updated as appropriate: allergies, current medications, past family history, past  medical history, past social history, past surgical history, and problem list.    Review of Systems   Constitutional: Negative for malaise/fatigue.   Cardiovascular:  Negative for chest pain, dyspnea on exertion, leg swelling and palpitations.   Respiratory:  Negative for cough and shortness of breath.    Gastrointestinal:  Negative for abdominal pain, nausea and vomiting.   Neurological:  Negative for dizziness, focal weakness, headaches, light-headedness and numbness.   All other systems reviewed and are negative.      Current Outpatient Medications:     acetaminophen (TYLENOL) 500 MG tablet, Take 1 tablet by mouth Every 6 (Six) Hours As Needed for Mild Pain., Disp: , Rfl:     allopurinol (ZYLOPRIM) 100 MG tablet, Take 1 tablet by mouth Daily., Disp: , Rfl:     apixaban (ELIQUIS) 2.5 MG tablet tablet, Take 1 tablet by mouth 2 (Two) Times a Day., Disp: 70 tablet, Rfl: 0    cholecalciferol (VITAMIN D3) 25 MCG (1000 UT) tablet, Take 1 tablet by mouth Daily., Disp: , Rfl:     cyclobenzaprine (FLEXERIL) 10 MG tablet, Take 1 tablet by mouth 3 (Three) Times a Day As Needed for Muscle Spasms. (Patient not taking: Reported on 10/1/2024), Disp: 30 tablet, Rfl: 0    dapagliflozin (Farxiga) 5 MG tablet tablet, Take 2 tablets by mouth Daily., Disp: , Rfl:     dilTIAZem CD (CARDIZEM CD) 240 MG 24 hr capsule, 1 capsule Daily., Disp: , Rfl:     furosemide (LASIX) 40 MG tablet, Take 1 tablet by mouth Daily., Disp: , Rfl:     hydrALAZINE (APRESOLINE) 25 MG tablet, Take 1 tablet by mouth 2 (Two) Times a Day., Disp: , Rfl:     isosorbide mononitrate (IMDUR) 30 MG 24 hr tablet, Take 1 tablet by mouth Daily., Disp: , Rfl:     levocetirizine (XYZAL) 5 MG tablet, Take 0.5 tablets by mouth Every Evening for 60 days., Disp: 30 tablet, Rfl: 0    Magnesium Oxide -Mg Supplement 400 MG capsule, 1 capsule Daily., Disp: , Rfl:     metFORMIN (FORTAMET) 500 MG (OSM) 24 hr tablet, Take 1 tablet by mouth Daily With Breakfast., Disp: , Rfl:      methylPREDNISolone (MEDROL) 4 MG dose pack, Take as directed on package instructions. (Patient not taking: Reported on 10/1/2024), Disp: 21 tablet, Rfl: 0    metoprolol succinate XL (TOPROL-XL) 25 MG 24 hr tablet, Take 1 tablet by mouth Daily., Disp: 90 tablet, Rfl: 3    omeprazole (priLOSEC) 40 MG capsule, Take 1 capsule by mouth Daily., Disp: , Rfl:     POTASSIUM CHLORIDE PO, Take 20 mEq by mouth Daily., Disp: , Rfl:     pravastatin (PRAVACHOL) 80 MG tablet, Take 1 tablet by mouth Every Night., Disp: , Rfl:     Allergies   Allergen Reactions    Bee Venom Anaphylaxis    Gemfibrozil Hives     Lopid        Family History   Problem Relation Age of Onset    Lymphoma Brother 42        Non Hodgkins lymphoma    Stroke Brother     Heart attack Other         Extensive MI History on Paternal side    Clotting disorder Sister     Heart attack Father     Heart failure Father     Heart attack Sister     Heart disease Sister     Hypertension Sister     Heart attack Brother     Heart disease Brother     Hypertension Brother     Heart attack Paternal Aunt     Heart attack Paternal Uncle        Past Surgical History:   Procedure Laterality Date    ANTERIOR CERVICAL DISCECTOMY W/ FUSION Bilateral 10/27/2022    Procedure: REOPERATION ANTERIOR CERVICAL DISCECTOMY AND FUSION C3-4 AND C4-5 WITH INTERBODY SPACER, AUTOGRAFT AND ANTERIOR INSTRUMENTATION AND REMOVAL OF ANTERIOR INSTRUMENTATION C5-6;  Surgeon: Juan Manuel Rushing MD;  Location: [facility] MAIN OR;  Service: Neurosurgery;  Laterality: Bilateral;    CARDIAC CATHETERIZATION      COLONOSCOPY N/A 11/05/2021    Procedure: COLONOSCOPY WITH POLYPECTOMY X 5;  Surgeon: Ronaldo Forrester MD;  Location: [facility] ENDOSCOPY;  Service: Gastroenterology;  Laterality: N/A;  POLYP, DIVERTICULOSIS    CYSTOSCOPY URETEROSCOPY Left 12/10/2018    Dr. Ty    HAND SURGERY Left     Ganglion cyst removed palm of left hand    HAND SURGERY  08/21/2019    REPLACEMENT TOTAL KNEE Bilateral     one in   and the other in     SEPTOPLASTY      deviated septum    VEIN SURGERY         Past Medical History:   Diagnosis Date    Allergic     Atrial fibrillation 2018    3/18/2024    CAD (coronary artery disease)     CKD (chronic kidney disease), stage II(HCC) 2018    Dr. West    Diabetes mellitus     Diverticulosis 10/2016    DJD (degenerative joint disease)     DVT, lower extremity, recurrent, left 2017    Elevated factor VIII level     Factor 5 Leiden mutation, heterozygous     Hyperlipidemia     Hypertension     Pneumonia 2018 and 2018    Renal calculi 2016    Sleep apnea     Dr. Grier    Urinary tract infection        Family History   Problem Relation Age of Onset    Lymphoma Brother 42        Non Hodgkins lymphoma    Stroke Brother     Heart attack Other         Extensive MI History on Paternal side    Clotting disorder Sister     Heart attack Father     Heart failure Father     Heart attack Sister     Heart disease Sister     Hypertension Sister     Heart attack Brother     Heart disease Brother     Hypertension Brother     Heart attack Paternal Aunt     Heart attack Paternal Uncle        Social History     Socioeconomic History    Marital status:    Tobacco Use    Smoking status: Former     Current packs/day: 0.00     Average packs/day: 1.5 packs/day for 2.6 years (3.9 ttl pk-yrs)     Types: Cigarettes     Start date: 1978     Quit date: 1980     Years since quittin.2     Passive exposure: Past    Smokeless tobacco: Never    Tobacco comments:     smoked one and a half packs of cigarettes a day for two years quitting at age 36   Vaping Use    Vaping status: Never Used   Substance and Sexual Activity    Alcohol use: Not Currently     Comment: drinks two beers monthly    Drug use: No    Sexual activity: Yes     Partners: Female     Birth control/protection: None           ECG 12 Lead    Date/Time: 2025 11:39 AM  Performed by:  Divya Gutierrez MD    Authorized by: Divya Gutierrez MD  Comparison: compared with previous ECG   Similar to previous ECG  Comparison to previous ECG: Rhythm nonspecific ST-T wave changes 61/min normal axis normal intervals no ectopy no significant change from previous EKG.        Objective:       Physical Exam    There were no vitals taken for this visit.  The patient is alert, oriented and in no distress.    Vital signs as noted above.  Exogenous obesity.     Head and neck revealed no carotid bruits or jugular venous distension.  No thyromegaly or lymphadenopathy is present.     Lungs clear.  No wheezing.  Breath sounds are normal bilaterally.     Heart normal first and second heart sounds.  No murmur..  No pericardial rub is present.  No gallop is present.     Abdomen soft and nontender.  No organomegaly is present.     Extremities revealed good peripheral pulses without any pedal edema.     Skin warm and dry.     Musculoskeletal system is grossly normal.     CNS grossly normal.    Reviewed and updated.

## 2025-04-01 ENCOUNTER — OFFICE VISIT (OUTPATIENT)
Dept: CARDIOLOGY | Facility: CLINIC | Age: 70
End: 2025-04-01
Payer: MEDICARE

## 2025-04-01 VITALS
WEIGHT: 307 LBS | HEIGHT: 71 IN | DIASTOLIC BLOOD PRESSURE: 85 MMHG | BODY MASS INDEX: 42.98 KG/M2 | SYSTOLIC BLOOD PRESSURE: 138 MMHG | HEART RATE: 63 BPM | OXYGEN SATURATION: 93 %

## 2025-04-01 DIAGNOSIS — N18.30 STAGE 3 CHRONIC KIDNEY DISEASE, UNSPECIFIED WHETHER STAGE 3A OR 3B CKD: ICD-10-CM

## 2025-04-01 DIAGNOSIS — I82.402 RECURRENT DEEP VEIN THROMBOSIS (DVT) OF LEFT LOWER EXTREMITY: ICD-10-CM

## 2025-04-01 DIAGNOSIS — I48.0 PAROXYSMAL ATRIAL FIBRILLATION: Primary | ICD-10-CM

## 2025-04-01 DIAGNOSIS — Z79.01 LONG TERM (CURRENT) USE OF ANTICOAGULANTS: ICD-10-CM

## 2025-04-01 DIAGNOSIS — Z79.01 CHRONIC ANTICOAGULATION: ICD-10-CM

## 2025-04-01 RX ORDER — SEMAGLUTIDE 0.68 MG/ML
INJECTION, SOLUTION SUBCUTANEOUS
COMMUNITY
Start: 2025-03-18

## 2025-04-01 RX ORDER — POTASSIUM CHLORIDE 1500 MG/1
TABLET, EXTENDED RELEASE ORAL
COMMUNITY
Start: 2025-03-14

## 2025-04-01 RX ORDER — DAPAGLIFLOZIN 10 MG/1
TABLET, FILM COATED ORAL
COMMUNITY
Start: 2025-03-27

## (undated) DEVICE — TUBING, SUCTION, 1/4" X 12', STRAIGHT: Brand: MEDLINE

## (undated) DEVICE — DRP C/ARMOR

## (undated) DEVICE — SUT PERMAHAND SILK 0 FSL 30IN BLK

## (undated) DEVICE — DRSNG WND BORDR/ADHS NONADHR/GZ LF 4X4IN STRL

## (undated) DEVICE — GLV SURG SENSICARE PI ORTHO SZ8 LF STRL

## (undated) DEVICE — DISTRACT SCRW 14MM STRL

## (undated) DEVICE — SLV SCD CALF HEMOFORCE DVT THERP REPROC MD

## (undated) DEVICE — KT SURG TURNOVER 050

## (undated) DEVICE — PK BASIC SPINE 50

## (undated) DEVICE — 3.0MM PRECISION NEURO (MATCH HEAD)

## (undated) DEVICE — ADHS SKIN PREMIERPRO EXOFIN TOPICAL HI/VISC .5ML

## (undated) DEVICE — DECANTER: Brand: UNBRANDED

## (undated) DEVICE — KT DRN EVAC WND PVC PCH WTROC RND 10F400

## (undated) DEVICE — SUT MONOCRYL 4/0 PS2 27IN Y426H ETY426H

## (undated) DEVICE — TBG PENCL TELESCP MEGADYNE SMOKE EVAC 15FT

## (undated) DEVICE — PAPR PRNT PK SONY W RIBN UPC55

## (undated) DEVICE — SUT VIC 3/0 SH CR8 18IN J864D

## (undated) DEVICE — SOLUTION,WATER,IRRIGATION,1000ML,STERILE: Brand: MEDLINE

## (undated) DEVICE — SNAR POLYP HOTSNARE/BRAIDED OVL/MINI 7F 2.8X10MM 230CM 1P/U

## (undated) DEVICE — PENCL HND ROCKRSWTCH HOLSTR EZ CLEAN TP CRD 10FT

## (undated) DEVICE — SYR LUERLOK 30CC

## (undated) DEVICE — SOL LACTATED RINGER 1000ML

## (undated) DEVICE — PK ENDO GI 50

## (undated) DEVICE — SYR LL TP 10ML STRL

## (undated) DEVICE — TRAP WIDEEYE POLYP